# Patient Record
Sex: FEMALE | Race: BLACK OR AFRICAN AMERICAN | Employment: OTHER
[De-identification: names, ages, dates, MRNs, and addresses within clinical notes are randomized per-mention and may not be internally consistent; named-entity substitution may affect disease eponyms.]

---

## 2017-03-06 RX ORDER — POTASSIUM CHLORIDE 750 MG/1
TABLET, EXTENDED RELEASE ORAL
Qty: 90 TABLET | Refills: 1 | Status: SHIPPED | OUTPATIENT
Start: 2017-03-06 | End: 2017-09-12 | Stop reason: SDUPTHER

## 2017-04-27 ENCOUNTER — TELEPHONE (OUTPATIENT)
Dept: OTHER | Facility: CLINIC | Age: 67
End: 2017-04-27

## 2017-09-12 ENCOUNTER — OFFICE VISIT (OUTPATIENT)
Dept: INTERNAL MEDICINE CLINIC | Age: 67
End: 2017-09-12

## 2017-09-12 VITALS
SYSTOLIC BLOOD PRESSURE: 134 MMHG | RESPIRATION RATE: 20 BRPM | HEART RATE: 80 BPM | WEIGHT: 293 LBS | BODY MASS INDEX: 48.82 KG/M2 | OXYGEN SATURATION: 95 % | DIASTOLIC BLOOD PRESSURE: 72 MMHG | HEIGHT: 65 IN

## 2017-09-12 DIAGNOSIS — Z12.11 SCREEN FOR COLON CANCER: ICD-10-CM

## 2017-09-12 DIAGNOSIS — I10 ESSENTIAL HYPERTENSION: ICD-10-CM

## 2017-09-12 DIAGNOSIS — Z76.89 ENCOUNTER TO ESTABLISH CARE: ICD-10-CM

## 2017-09-12 DIAGNOSIS — Z23 NEED FOR PNEUMOCOCCAL VACCINE: ICD-10-CM

## 2017-09-12 DIAGNOSIS — Z12.31 SCREENING MAMMOGRAM, ENCOUNTER FOR: ICD-10-CM

## 2017-09-12 DIAGNOSIS — Z12.11 COLON CANCER SCREENING: ICD-10-CM

## 2017-09-12 DIAGNOSIS — Z78.0 POST-MENOPAUSAL: ICD-10-CM

## 2017-09-12 DIAGNOSIS — E66.01 MORBID OBESITY DUE TO EXCESS CALORIES (HCC): ICD-10-CM

## 2017-09-12 LAB
CREATININE URINE POCT: 97.4
HBA1C MFR BLD: 8.4 %
MICROALBUMIN/CREAT 24H UR: 5.9 MG/G{CREAT}
MICROALBUMIN/CREAT UR-RTO: 6.1

## 2017-09-12 PROCEDURE — 1090F PRES/ABSN URINE INCON ASSESS: CPT | Performed by: FAMILY MEDICINE

## 2017-09-12 PROCEDURE — 1036F TOBACCO NON-USER: CPT | Performed by: FAMILY MEDICINE

## 2017-09-12 PROCEDURE — G8400 PT W/DXA NO RESULTS DOC: HCPCS | Performed by: FAMILY MEDICINE

## 2017-09-12 PROCEDURE — 3046F HEMOGLOBIN A1C LEVEL >9.0%: CPT | Performed by: FAMILY MEDICINE

## 2017-09-12 PROCEDURE — G8427 DOCREV CUR MEDS BY ELIG CLIN: HCPCS | Performed by: FAMILY MEDICINE

## 2017-09-12 PROCEDURE — G0009 ADMIN PNEUMOCOCCAL VACCINE: HCPCS | Performed by: FAMILY MEDICINE

## 2017-09-12 PROCEDURE — 4040F PNEUMOC VAC/ADMIN/RCVD: CPT | Performed by: FAMILY MEDICINE

## 2017-09-12 PROCEDURE — 3017F COLORECTAL CA SCREEN DOC REV: CPT | Performed by: FAMILY MEDICINE

## 2017-09-12 PROCEDURE — 90732 PPSV23 VACC 2 YRS+ SUBQ/IM: CPT | Performed by: FAMILY MEDICINE

## 2017-09-12 PROCEDURE — 1123F ACP DISCUSS/DSCN MKR DOCD: CPT | Performed by: FAMILY MEDICINE

## 2017-09-12 PROCEDURE — 83036 HEMOGLOBIN GLYCOSYLATED A1C: CPT | Performed by: FAMILY MEDICINE

## 2017-09-12 PROCEDURE — 82044 UR ALBUMIN SEMIQUANTITATIVE: CPT | Performed by: FAMILY MEDICINE

## 2017-09-12 PROCEDURE — G8417 CALC BMI ABV UP PARAM F/U: HCPCS | Performed by: FAMILY MEDICINE

## 2017-09-12 PROCEDURE — 3014F SCREEN MAMMO DOC REV: CPT | Performed by: FAMILY MEDICINE

## 2017-09-12 PROCEDURE — 99204 OFFICE O/P NEW MOD 45 MIN: CPT | Performed by: FAMILY MEDICINE

## 2017-09-12 RX ORDER — TRIAMTERENE AND HYDROCHLOROTHIAZIDE 37.5; 25 MG/1; MG/1
1 TABLET ORAL DAILY
Qty: 90 TABLET | Refills: 1 | Status: SHIPPED | OUTPATIENT
Start: 2017-09-12 | End: 2018-01-14 | Stop reason: SDUPTHER

## 2017-09-12 RX ORDER — LOSARTAN POTASSIUM 50 MG/1
50 TABLET ORAL DAILY
Qty: 90 TABLET | Refills: 1 | Status: SHIPPED | OUTPATIENT
Start: 2017-09-12 | End: 2018-03-02 | Stop reason: SINTOL

## 2017-09-12 ASSESSMENT — PATIENT HEALTH QUESTIONNAIRE - PHQ9
SUM OF ALL RESPONSES TO PHQ9 QUESTIONS 1 & 2: 0
1. LITTLE INTEREST OR PLEASURE IN DOING THINGS: 0
2. FEELING DOWN, DEPRESSED OR HOPELESS: 0
SUM OF ALL RESPONSES TO PHQ QUESTIONS 1-9: 0

## 2017-09-12 ASSESSMENT — ENCOUNTER SYMPTOMS
ABDOMINAL PAIN: 0
CONSTIPATION: 0
BLOOD IN STOOL: 0
DIARRHEA: 0
SHORTNESS OF BREATH: 0

## 2017-09-20 ENCOUNTER — HOSPITAL ENCOUNTER (OUTPATIENT)
Dept: GENERAL RADIOLOGY | Age: 67
Discharge: HOME | End: 2017-09-20
Admitting: FAMILY MEDICINE

## 2017-09-20 DIAGNOSIS — Z78.0 POST-MENOPAUSAL: ICD-10-CM

## 2017-09-20 DIAGNOSIS — Z78.0 ASYMPTOMATIC MENOPAUSAL STATE: ICD-10-CM

## 2017-09-20 DIAGNOSIS — Z12.31 SCREENING MAMMOGRAM, ENCOUNTER FOR: ICD-10-CM

## 2017-09-21 ENCOUNTER — HOSPITAL ENCOUNTER (OUTPATIENT)
Dept: OTHER | Age: 67
Discharge: HOME | End: 2017-09-21
Attending: FAMILY MEDICINE | Admitting: FAMILY MEDICINE

## 2017-09-21 DIAGNOSIS — Z12.31 ENCOUNTER FOR SCREENING MAMMOGRAM FOR BREAST CANCER: ICD-10-CM

## 2017-09-23 ENCOUNTER — NURSE ONLY (OUTPATIENT)
Dept: INTERNAL MEDICINE CLINIC | Age: 67
End: 2017-09-23

## 2017-09-23 DIAGNOSIS — Z13.29 THYROID DISORDER SCREEN: ICD-10-CM

## 2017-09-23 DIAGNOSIS — I10 ESSENTIAL HYPERTENSION: ICD-10-CM

## 2017-09-23 DIAGNOSIS — Z11.59 NEED FOR HEPATITIS C SCREENING TEST: ICD-10-CM

## 2017-09-23 DIAGNOSIS — Z13.220 LIPID SCREENING: ICD-10-CM

## 2017-09-23 DIAGNOSIS — Z11.4 SCREENING FOR HIV (HUMAN IMMUNODEFICIENCY VIRUS): ICD-10-CM

## 2017-09-23 LAB
A/G RATIO: 1.1 (ref 1.1–2.2)
ALBUMIN SERPL-MCNC: 3.7 G/DL (ref 3.4–5)
ALP BLD-CCNC: 57 U/L (ref 40–129)
ALT SERPL-CCNC: 12 U/L (ref 10–40)
ANION GAP SERPL CALCULATED.3IONS-SCNC: 13 MMOL/L (ref 3–16)
AST SERPL-CCNC: 16 U/L (ref 15–37)
BASOPHILS ABSOLUTE: 0 K/UL (ref 0–0.2)
BASOPHILS RELATIVE PERCENT: 0.9 %
BILIRUB SERPL-MCNC: 0.3 MG/DL (ref 0–1)
BUN BLDV-MCNC: 23 MG/DL (ref 7–20)
CALCIUM SERPL-MCNC: 9.7 MG/DL (ref 8.3–10.6)
CHLORIDE BLD-SCNC: 102 MMOL/L (ref 99–110)
CHOLESTEROL, TOTAL: 147 MG/DL (ref 0–199)
CO2: 29 MMOL/L (ref 21–32)
CREAT SERPL-MCNC: 1.1 MG/DL (ref 0.6–1.2)
EOSINOPHILS ABSOLUTE: 0.2 K/UL (ref 0–0.6)
EOSINOPHILS RELATIVE PERCENT: 3.3 %
GFR AFRICAN AMERICAN: >60
GFR NON-AFRICAN AMERICAN: 50
GLOBULIN: 3.3 G/DL
GLUCOSE BLD-MCNC: 168 MG/DL (ref 70–99)
HCT VFR BLD CALC: 38.2 % (ref 36–48)
HDLC SERPL-MCNC: 63 MG/DL (ref 40–60)
HEMOGLOBIN: 12.5 G/DL (ref 12–16)
HEPATITIS C ANTIBODY INTERPRETATION: NORMAL
LDL CHOLESTEROL CALCULATED: 70 MG/DL
LYMPHOCYTES ABSOLUTE: 1.3 K/UL (ref 1–5.1)
LYMPHOCYTES RELATIVE PERCENT: 24.8 %
MCH RBC QN AUTO: 29 PG (ref 26–34)
MCHC RBC AUTO-ENTMCNC: 32.8 G/DL (ref 31–36)
MCV RBC AUTO: 88.4 FL (ref 80–100)
MONOCYTES ABSOLUTE: 0.5 K/UL (ref 0–1.3)
MONOCYTES RELATIVE PERCENT: 9.8 %
NEUTROPHILS ABSOLUTE: 3.1 K/UL (ref 1.7–7.7)
NEUTROPHILS RELATIVE PERCENT: 61.2 %
PDW BLD-RTO: 15.7 % (ref 12.4–15.4)
PLATELET # BLD: 238 K/UL (ref 135–450)
PMV BLD AUTO: 9.9 FL (ref 5–10.5)
POTASSIUM SERPL-SCNC: 4.2 MMOL/L (ref 3.5–5.1)
RBC # BLD: 4.32 M/UL (ref 4–5.2)
SODIUM BLD-SCNC: 144 MMOL/L (ref 136–145)
T4 FREE: 1 NG/DL (ref 0.9–1.8)
TOTAL PROTEIN: 7 G/DL (ref 6.4–8.2)
TRIGL SERPL-MCNC: 72 MG/DL (ref 0–150)
TSH SERPL DL<=0.05 MIU/L-ACNC: 2.06 UIU/ML (ref 0.27–4.2)
VLDLC SERPL CALC-MCNC: 14 MG/DL
WBC # BLD: 5.1 K/UL (ref 4–11)

## 2017-09-23 PROCEDURE — 36415 COLL VENOUS BLD VENIPUNCTURE: CPT | Performed by: FAMILY MEDICINE

## 2017-09-25 LAB — HIV-1 AND HIV-2 ANTIBODIES: NORMAL

## 2017-09-26 DIAGNOSIS — E11.69 TYPE 2 DIABETES MELLITUS WITH OTHER SPECIFIED COMPLICATION (HCC): ICD-10-CM

## 2017-09-26 LAB
CONTROL: PRESENT
HEMOCCULT STL QL: NEGATIVE

## 2017-09-26 PROCEDURE — 82274 ASSAY TEST FOR BLOOD FECAL: CPT | Performed by: FAMILY MEDICINE

## 2017-09-26 RX ORDER — METFORMIN HYDROCHLORIDE EXTENDED-RELEASE TABLETS 1000 MG/1
1000 TABLET, FILM COATED, EXTENDED RELEASE ORAL 2 TIMES DAILY WITH MEALS
Qty: 60 TABLET | Refills: 1 | Status: CANCELLED | OUTPATIENT
Start: 2017-09-26 | End: 2017-10-26

## 2017-10-03 ENCOUNTER — TELEPHONE (OUTPATIENT)
Dept: INTERNAL MEDICINE CLINIC | Age: 67
End: 2017-10-03

## 2017-10-04 RX ORDER — BLOOD SUGAR DIAGNOSTIC
STRIP MISCELLANEOUS
Qty: 100 EACH | Refills: 1 | Status: SHIPPED | OUTPATIENT
Start: 2017-10-04 | End: 2018-08-21 | Stop reason: SDUPTHER

## 2017-11-13 ENCOUNTER — OFFICE VISIT (OUTPATIENT)
Dept: INTERNAL MEDICINE CLINIC | Age: 67
End: 2017-11-13

## 2017-11-13 VITALS
SYSTOLIC BLOOD PRESSURE: 137 MMHG | WEIGHT: 293 LBS | DIASTOLIC BLOOD PRESSURE: 80 MMHG | OXYGEN SATURATION: 96 % | BODY MASS INDEX: 52.42 KG/M2 | HEART RATE: 70 BPM

## 2017-11-13 DIAGNOSIS — I10 ESSENTIAL HYPERTENSION: ICD-10-CM

## 2017-11-13 DIAGNOSIS — E11.69 TYPE 2 DIABETES MELLITUS WITH OTHER SPECIFIED COMPLICATION, WITH LONG-TERM CURRENT USE OF INSULIN (HCC): Primary | ICD-10-CM

## 2017-11-13 DIAGNOSIS — E66.01 MORBID OBESITY DUE TO EXCESS CALORIES (HCC): ICD-10-CM

## 2017-11-13 DIAGNOSIS — Z79.4 TYPE 2 DIABETES MELLITUS WITH OTHER SPECIFIED COMPLICATION, WITH LONG-TERM CURRENT USE OF INSULIN (HCC): Primary | ICD-10-CM

## 2017-11-13 LAB — HBA1C MFR BLD: 7.4 %

## 2017-11-13 PROCEDURE — 99214 OFFICE O/P EST MOD 30 MIN: CPT | Performed by: FAMILY MEDICINE

## 2017-11-13 PROCEDURE — 1036F TOBACCO NON-USER: CPT | Performed by: FAMILY MEDICINE

## 2017-11-13 PROCEDURE — G8399 PT W/DXA RESULTS DOCUMENT: HCPCS | Performed by: FAMILY MEDICINE

## 2017-11-13 PROCEDURE — 1090F PRES/ABSN URINE INCON ASSESS: CPT | Performed by: FAMILY MEDICINE

## 2017-11-13 PROCEDURE — G8427 DOCREV CUR MEDS BY ELIG CLIN: HCPCS | Performed by: FAMILY MEDICINE

## 2017-11-13 PROCEDURE — G8484 FLU IMMUNIZE NO ADMIN: HCPCS | Performed by: FAMILY MEDICINE

## 2017-11-13 PROCEDURE — 83036 HEMOGLOBIN GLYCOSYLATED A1C: CPT | Performed by: FAMILY MEDICINE

## 2017-11-13 PROCEDURE — 3017F COLORECTAL CA SCREEN DOC REV: CPT | Performed by: FAMILY MEDICINE

## 2017-11-13 PROCEDURE — G8417 CALC BMI ABV UP PARAM F/U: HCPCS | Performed by: FAMILY MEDICINE

## 2017-11-13 PROCEDURE — 4040F PNEUMOC VAC/ADMIN/RCVD: CPT | Performed by: FAMILY MEDICINE

## 2017-11-13 PROCEDURE — 3045F PR MOST RECENT HEMOGLOBIN A1C LEVEL 7.0-9.0%: CPT | Performed by: FAMILY MEDICINE

## 2017-11-13 PROCEDURE — 3014F SCREEN MAMMO DOC REV: CPT | Performed by: FAMILY MEDICINE

## 2017-11-13 PROCEDURE — 1123F ACP DISCUSS/DSCN MKR DOCD: CPT | Performed by: FAMILY MEDICINE

## 2017-11-13 ASSESSMENT — ENCOUNTER SYMPTOMS
BLOOD IN STOOL: 0
SHORTNESS OF BREATH: 0
CONSTIPATION: 0
ABDOMINAL PAIN: 0
DIARRHEA: 0

## 2017-11-13 NOTE — PROGRESS NOTES
Subjective:      Patient ID: Kaley Crawford is a 74 y.o. female. HPI  Patient return to the office for follow-up of diabetes. SHe does not check blood sugar but denies any hypoglycemic episode. Apparently she cut down the dose of Levemir to 60 units instead of 70 units per day. She still take care NovoLog flex pen and follow  sliding scale but generally inject herself 10 units at breakfast and 20 units at supper. She on the eat small amount  at lunch time and generally does not the inject NovoLog insulin  Review of Systems   Constitutional: Negative for activity change and appetite change. Eyes: Negative for visual disturbance. Respiratory: Negative for shortness of breath. Cardiovascular: Negative for chest pain and leg swelling. Gastrointestinal: Negative for abdominal pain, blood in stool, constipation and diarrhea. Genitourinary: Negative for difficulty urinating, frequency, hematuria, menstrual problem and urgency. Neurological: Negative for dizziness and syncope. Psychiatric/Behavioral: Negative for behavioral problems. Objective:   Physical Exam   Constitutional: She is oriented to person, place, and time. She appears well-developed and well-nourished. No distress. HENT:   Head: Normocephalic. Eyes: Conjunctivae are normal.   Neck: Neck supple. No thyromegaly present. Cardiovascular: Normal rate, regular rhythm and normal heart sounds. No murmur heard. Pulmonary/Chest: Breath sounds normal. No respiratory distress. She has no wheezes. She has no rales. Abdominal: Soft. She exhibits no distension. Musculoskeletal: Normal range of motion. She exhibits no edema. Neurological: She is alert and oriented to person, place, and time. Skin: Skin is warm. No rash noted. Psychiatric: She has a normal mood and affect. Her behavior is normal.       Assessment:      1.  Type 2 diabetes mellitus with other specified complication, with long-term current use of insulin (HCC) -HbA1c today 7.2% from 8.4% last visit. Continue Levemir 70 units daily and NovoLog insulin 10 units at breakfast and lunch and 20 units at supper. Plus metformin 500 mg twice a day   2. Essential hypertension -controlled continue losartan 50 mg daily and the Maxide 25 daily   3.  Morbid obesity due to excess calories ([address] -she needs to lose weight with diet and exercise            Plan:      As above RTC in 3 mos

## 2018-01-08 RX ORDER — POTASSIUM CHLORIDE 750 MG/1
10 TABLET, EXTENDED RELEASE ORAL DAILY
Qty: 30 TABLET | Refills: 5 | Status: SHIPPED | OUTPATIENT
Start: 2018-01-08 | End: 2018-01-09 | Stop reason: SDUPTHER

## 2018-01-09 ENCOUNTER — OFFICE VISIT (OUTPATIENT)
Dept: INTERNAL MEDICINE CLINIC | Age: 68
End: 2018-01-09

## 2018-01-09 VITALS
RESPIRATION RATE: 20 BRPM | SYSTOLIC BLOOD PRESSURE: 132 MMHG | DIASTOLIC BLOOD PRESSURE: 64 MMHG | OXYGEN SATURATION: 96 % | HEART RATE: 82 BPM

## 2018-01-09 DIAGNOSIS — I10 ESSENTIAL HYPERTENSION: ICD-10-CM

## 2018-01-09 DIAGNOSIS — Z79.4 TYPE 2 DIABETES MELLITUS WITH OTHER SPECIFIED COMPLICATION, WITH LONG-TERM CURRENT USE OF INSULIN (HCC): ICD-10-CM

## 2018-01-09 DIAGNOSIS — J20.9 ACUTE BRONCHITIS DUE TO INFECTION: Primary | ICD-10-CM

## 2018-01-09 DIAGNOSIS — E11.69 TYPE 2 DIABETES MELLITUS WITH OTHER SPECIFIED COMPLICATION, WITH LONG-TERM CURRENT USE OF INSULIN (HCC): ICD-10-CM

## 2018-01-09 DIAGNOSIS — E66.01 MORBID OBESITY DUE TO EXCESS CALORIES (HCC): ICD-10-CM

## 2018-01-09 PROCEDURE — 1090F PRES/ABSN URINE INCON ASSESS: CPT | Performed by: FAMILY MEDICINE

## 2018-01-09 PROCEDURE — G8417 CALC BMI ABV UP PARAM F/U: HCPCS | Performed by: FAMILY MEDICINE

## 2018-01-09 PROCEDURE — 99214 OFFICE O/P EST MOD 30 MIN: CPT | Performed by: FAMILY MEDICINE

## 2018-01-09 PROCEDURE — 1123F ACP DISCUSS/DSCN MKR DOCD: CPT | Performed by: FAMILY MEDICINE

## 2018-01-09 PROCEDURE — G8484 FLU IMMUNIZE NO ADMIN: HCPCS | Performed by: FAMILY MEDICINE

## 2018-01-09 PROCEDURE — 1036F TOBACCO NON-USER: CPT | Performed by: FAMILY MEDICINE

## 2018-01-09 PROCEDURE — G8399 PT W/DXA RESULTS DOCUMENT: HCPCS | Performed by: FAMILY MEDICINE

## 2018-01-09 PROCEDURE — 3046F HEMOGLOBIN A1C LEVEL >9.0%: CPT | Performed by: FAMILY MEDICINE

## 2018-01-09 PROCEDURE — 4040F PNEUMOC VAC/ADMIN/RCVD: CPT | Performed by: FAMILY MEDICINE

## 2018-01-09 PROCEDURE — G8427 DOCREV CUR MEDS BY ELIG CLIN: HCPCS | Performed by: FAMILY MEDICINE

## 2018-01-09 PROCEDURE — 3014F SCREEN MAMMO DOC REV: CPT | Performed by: FAMILY MEDICINE

## 2018-01-09 PROCEDURE — 3017F COLORECTAL CA SCREEN DOC REV: CPT | Performed by: FAMILY MEDICINE

## 2018-01-09 RX ORDER — AMOXICILLIN 875 MG/1
875 TABLET, COATED ORAL 2 TIMES DAILY
Qty: 20 TABLET | Refills: 0 | Status: SHIPPED | OUTPATIENT
Start: 2018-01-09 | End: 2018-01-19

## 2018-01-09 RX ORDER — PROMETHAZINE HYDROCHLORIDE AND CODEINE PHOSPHATE 6.25; 1 MG/5ML; MG/5ML
5 SYRUP ORAL 4 TIMES DAILY PRN
Qty: 120 ML | Refills: 0 | Status: SHIPPED | OUTPATIENT
Start: 2018-01-09 | End: 2018-01-23 | Stop reason: SDUPTHER

## 2018-01-09 RX ORDER — POTASSIUM CHLORIDE 750 MG/1
10 TABLET, EXTENDED RELEASE ORAL DAILY
Qty: 90 TABLET | Refills: 1 | Status: SHIPPED | OUTPATIENT
Start: 2018-01-09 | End: 2018-03-02 | Stop reason: SDUPTHER

## 2018-01-09 RX ORDER — AMOXICILLIN 875 MG/1
875 TABLET, COATED ORAL 2 TIMES DAILY
Qty: 20 TABLET | Refills: 0 | Status: SHIPPED | OUTPATIENT
Start: 2018-01-09 | End: 2018-01-09 | Stop reason: SDUPTHER

## 2018-01-09 ASSESSMENT — ENCOUNTER SYMPTOMS
ABDOMINAL PAIN: 0
CONSTIPATION: 0
BLOOD IN STOOL: 0
DIARRHEA: 0
SHORTNESS OF BREATH: 0

## 2018-01-14 DIAGNOSIS — I10 ESSENTIAL HYPERTENSION: ICD-10-CM

## 2018-01-15 RX ORDER — TRIAMTERENE AND HYDROCHLOROTHIAZIDE 37.5; 25 MG/1; MG/1
TABLET ORAL
Qty: 90 TABLET | Refills: 1 | Status: SHIPPED | OUTPATIENT
Start: 2018-01-15 | End: 2018-04-16 | Stop reason: SDUPTHER

## 2018-01-23 ENCOUNTER — TELEPHONE (OUTPATIENT)
Dept: INTERNAL MEDICINE CLINIC | Age: 68
End: 2018-01-23

## 2018-01-23 DIAGNOSIS — J20.9 ACUTE BRONCHITIS DUE TO INFECTION: ICD-10-CM

## 2018-01-23 RX ORDER — PROMETHAZINE HYDROCHLORIDE AND CODEINE PHOSPHATE 6.25; 1 MG/5ML; MG/5ML
5 SYRUP ORAL 4 TIMES DAILY PRN
Qty: 240 ML | Refills: 0 | OUTPATIENT
Start: 2018-01-23 | End: 2019-11-22 | Stop reason: SDUPTHER

## 2018-02-28 ENCOUNTER — OFFICE VISIT (OUTPATIENT)
Dept: ORTHOPEDIC SURGERY | Age: 68
End: 2018-02-28

## 2018-02-28 VITALS — SYSTOLIC BLOOD PRESSURE: 153 MMHG | DIASTOLIC BLOOD PRESSURE: 66 MMHG | HEART RATE: 83 BPM

## 2018-02-28 DIAGNOSIS — M65.342 TRIGGER RING FINGER OF LEFT HAND: ICD-10-CM

## 2018-02-28 PROCEDURE — G8427 DOCREV CUR MEDS BY ELIG CLIN: HCPCS | Performed by: ORTHOPAEDIC SURGERY

## 2018-02-28 PROCEDURE — G8484 FLU IMMUNIZE NO ADMIN: HCPCS | Performed by: ORTHOPAEDIC SURGERY

## 2018-02-28 PROCEDURE — G8417 CALC BMI ABV UP PARAM F/U: HCPCS | Performed by: ORTHOPAEDIC SURGERY

## 2018-02-28 PROCEDURE — 3014F SCREEN MAMMO DOC REV: CPT | Performed by: ORTHOPAEDIC SURGERY

## 2018-02-28 PROCEDURE — 3017F COLORECTAL CA SCREEN DOC REV: CPT | Performed by: ORTHOPAEDIC SURGERY

## 2018-02-28 PROCEDURE — 1090F PRES/ABSN URINE INCON ASSESS: CPT | Performed by: ORTHOPAEDIC SURGERY

## 2018-02-28 PROCEDURE — 99203 OFFICE O/P NEW LOW 30 MIN: CPT | Performed by: ORTHOPAEDIC SURGERY

## 2018-02-28 PROCEDURE — 4040F PNEUMOC VAC/ADMIN/RCVD: CPT | Performed by: ORTHOPAEDIC SURGERY

## 2018-02-28 NOTE — LETTER
[facility]  Surgery Scheduling Form:      DEMOGRAPHICS:                                                                                                              .    Patient Name:  Edie Orozco  Patient :  1950   Patient SS#:  [id number]    Patient Phone:  [phone] (home)     Patient Address:  [address] [place] [address]    PCP:  Lelia Alford MD  Insurance:  Payor/Plan Subscr  Sex Relation Sub. Ins. ID Effective Group Num   1. MEDICARE - ME* CLAU OROZCO 1950 Female  [id number]                                    [address]      DIAGNOSIS & PROCEDURE:                                                                                            .  Diagnosis:   left Ring Finger Trigger Finger (727.03)  Operation:  left Ring Finger Trigger Finger Release  Location:  [facility] [place]  Surgeon:  Rebekah Roberto    SCHEDULING INFORMATION:                                                                                         .  Surgeon's Scheduling Instruction:  elective    Requested Date:  3-8  OR Time:  10:50 Patient Arrival Time:  9:15  OR Time Required:  5  Minutes  Anesthesia:  MAC/TIVA  Equipment:  None  Mini C-Arm:  No   Standard C-Arm:  No  Status:  outpatient  PAT Required:  Yes  Comments:             ALLERGIES:  CEPHALEXIN                       Jr Bowen MD  18     BILLING INFORMATION:                                                                                                    .    Procedure:       CPT Code Modifier

## 2018-02-28 NOTE — LETTER
CONSENT TO OPERATION  AND/OR OTHER PROCEDURE(S)          PATIENT : Oziel Baez   YOB: 1950      DATE : 2/28/18          1. I request and consent that Dr. Emma Jade. Gil Cohen,  and/or his associates or assistants perform an operation and/or procedures on the above patient at  [place] [address], to treat the condition(s) which appear indicated by the diagnostic studies already performed. I have been told that in general terms the nature, purpose and reasonable expectations of the operation and/or procedure(s) are:           left Ring Finger Trigger Finger Release      2. It has been explained to me by the informing physician that during the course of the operation and/or procedure(s) unforeseen conditions may be revealed that necessitate an extension of the original operation and/or procedure(s) or different operation and/or procedures than those set forth in Paragraph 1. I therefore authorize and request that my physician and/or his associates or assistants perform such operations and/or procedures as are necessary and desirable in the exercise of professional judgment. The authority granted under this Paragraph 2 shall extend to all conditions that require treatment and are known to my physician at the time the operation is commenced. 3. I have been made aware by the informing physician of certain risks and consequences that are associated with the operation and/or procedure(s) described in Paragraph 1, the reasonable alternative methods or treatment, the possible consequences, the possibility that the operation and/or procedure(s) may be unsuccessful and the possibility of complications. I understand the reasonably known risks to be:      ? Bleeding  ? Infection  ? Poor Healing  ? Possible Damage to Nerve, Vessel, Tendon/Muscle or Bone  ? Need for further Treatment/Surgery  ? Stiffness  ? Pain  ? Residual or Recurrent Symptoms  ?  Anesthetic and/or Medical Risks ?                   4. I have also been informed by the informing physician that there are other risks from both known and unknown causes that are attendant to the performance of any surgical procedure. I am aware that the practice of medicine and surgery is not an exact science, and that no guarantees have been made to me concerning the results of the operation and/or procedure(s). 5. I   CONSENT / REFUSE CONSENT  (strike the phrase that does not apply) to the taking of photographs before, during and/or after the operation or procedure for scientific/educational purposes. 6. I consent to the administration of anesthesia and to the use of such anesthetics as may be deemed advisable by the anesthesiologist who has been engaged by me or my physician. 7. I certify that I have read and understand the above consent to operation and/or other procedure(s); that the explanations therein referred to were made to me by the informing physician in advance of my signing this consent; that all blanks or statements requiring insertion or completion were filled in and inapplicable paragraphs, if any, were stricken before I signed; and that all questions asked by me about the operation and/or procedure(s) which I have consented to have been fully answered in a satisfactory manner.               _______________________  Witness        Signature Of Patient          Amaya Mathew. Marlo Klinefelter      _______________________  Informing Physician         Signature of Informing Physician           If patient is unable to sign or is a minor, complete one of the following:    (A)  Patient is a minor   years of age. (B)  Patient is unable to sign because: The undersigned represents that he or she is duly authorized to execute this consent for and on behalf of the above named patient.                Witness               o  Parent  o  Guardian   o  Spouse       o  Other (specify)

## 2018-02-28 NOTE — PROGRESS NOTES
all to her satisfaction. I feel that Ms. Fermin Baez  and any present family members do understand our discussion today and they have provided informed consent for left Ring Finger Flexor Tendon Sheath A1 Pulley Release (Trigger Finger Release). I have also discussed with Ms. Dominique Mosquera the other treatment options available to her for this condition. We have today selected to proceed with Surgical treatment. She has voiced and  understanding that there are other less aggressive treatment options which are available in this situation, albeit possibly less efficacious or durable, and she is comfortable with the plan that she has chosen. Ms. Dominique Mosquera has been given a full verbal list of instructions and precautions related to her present condition. I have asked her to followup with me in the office at the prescribed time. She is also specifically requested to call or return to the office sooner if her symptoms change or worsen prior to the next scheduled appointment.

## 2018-02-28 NOTE — PATIENT INSTRUCTIONS
Pre-Operative Instructions    1. The night before your surgery, unless otherwise instructed, do not eat any food, drink any liquids, chew gum or mints after midnight. Abstain from alcohol for 24 hours prior to surgery. 2. You will be contacted by the Hospital the working day prior to your procedure to confirm your arrival time. 3. Patients under 25years of age must have a parent or legal guardian present to sign their consent and discharge paperwork. 4. On the day of surgery,  you will be seen pre-operatively by an anesthesiologist.     5. If you are having hand surgery, it is recommended that nail polish and acrylic nails be removed prior to surgery if possible. 6. Please bring cases for glasses, contact lenses, hearing aids or dentures. They will likely be removed prior to surgery. 7. Wear casual, loose-fitting and comfortable clothing. Consider that you may have a large dressing to fit under your clothing after surgery. 9. Please do not bring valuables such as jewelry or large sums of cash to the hospital. Remove all body piercings before coming to the hospital. Landon Sánchez may not  wear any rings on the hand if you are having surgery on that hand, wrist or elbow. 10. Do not smoke or chew tobacco before your surgery. [address] [place] [address] and surgery facilities are smoke-free environments. Smoking is not permitted anywhere on campus. 11. Be sure to follow any additional instructions from your physician. If the above conditions are not met, your surgery may be cancelled and rescheduled for another day. Should you develop any change in your health such as fever, cough, sore throat, cold, flu, or infection, or if you have any questions regarding your Pre-admission or surgery, please contact [address] - Surgery Scheduling at [phone], Monday through Friday, 9 a.m. to 5 p.m.

## 2018-03-01 ENCOUNTER — PAT TELEPHONE (OUTPATIENT)
Dept: PREADMISSION TESTING | Age: 68
End: 2018-03-01

## 2018-03-01 VITALS — BODY MASS INDEX: 48.82 KG/M2 | WEIGHT: 293 LBS | HEIGHT: 65 IN

## 2018-03-01 NOTE — PRE-PROCEDURE INSTRUCTIONS
[facility] time_0945___________        Surgery time___1120_________    Take the following medications with a sip of water:triamterene-hold metformin dos and evening dose night before surgery-ask pcp for insulin instructions prior to surgery  Do not eat or drink anything after 12:00 midnight prior to your surgery. This includes water chewing gum, mints and ice chips. You may brush your teeth and gargle the morning of your surgery, but do not swallow the water     Please see your family doctor/pediatrician for a history and physical and/or concerning medications. Bring any test results/reports from your physicians office. If you are under the care of a heart doctor or specialist doctor, please be aware that you may be asked to them for clearance    You may be asked to stop blood thinners such as Coumadin, Plavix, Fragmin, Lovenox, etc., or any anti-inflammatories such as:  Aspirin, Ibuprofen, Advil, Naproxen prior to your surgery. We also ask that you stop any OTC medications such as fish oil, vitamin E, glucosamine, garlic, Multivitamins, COQ 10, etc.may take tylenol    We ask that you do not smoke 24 hours prior to surgery  We ask that you do not  drink any alcoholic beverages 24 hours prior to surgery     You must make arrangements for a responsible adult to take you home after your surgery. For your safety you will not be allowed to leave alone or drive yourself home. Your surgery will be cancelled if you do not have a ride home. Also for your safety, it is strongly suggested that someone stay with you the first 24 hours after your surgery. A parent or legal guardian must accompany a child scheduled for surgery and plan to stay at the hospital until the child is discharged. Please do not bring other children with you. For your comfort, please wear simple loose fitting clothing to the hospital.  Please do not bring valuables.     Do not instructions according to your surgery.

## 2018-03-02 ENCOUNTER — OFFICE VISIT (OUTPATIENT)
Dept: INTERNAL MEDICINE CLINIC | Age: 68
End: 2018-03-02

## 2018-03-02 VITALS
SYSTOLIC BLOOD PRESSURE: 116 MMHG | DIASTOLIC BLOOD PRESSURE: 62 MMHG | HEART RATE: 83 BPM | WEIGHT: 293 LBS | BODY MASS INDEX: 51.95 KG/M2

## 2018-03-02 DIAGNOSIS — Z01.818 PRE-OP EVALUATION: Primary | ICD-10-CM

## 2018-03-02 DIAGNOSIS — I10 ESSENTIAL HYPERTENSION: ICD-10-CM

## 2018-03-02 DIAGNOSIS — M65.342 TRIGGER FINGER, LEFT RING FINGER: ICD-10-CM

## 2018-03-02 LAB — HBA1C MFR BLD: 8.3 %

## 2018-03-02 PROCEDURE — 93000 ELECTROCARDIOGRAM COMPLETE: CPT | Performed by: FAMILY MEDICINE

## 2018-03-02 PROCEDURE — 83036 HEMOGLOBIN GLYCOSYLATED A1C: CPT | Performed by: FAMILY MEDICINE

## 2018-03-02 PROCEDURE — 4040F PNEUMOC VAC/ADMIN/RCVD: CPT | Performed by: FAMILY MEDICINE

## 2018-03-02 PROCEDURE — 3017F COLORECTAL CA SCREEN DOC REV: CPT | Performed by: FAMILY MEDICINE

## 2018-03-02 PROCEDURE — G8484 FLU IMMUNIZE NO ADMIN: HCPCS | Performed by: FAMILY MEDICINE

## 2018-03-02 PROCEDURE — G8399 PT W/DXA RESULTS DOCUMENT: HCPCS | Performed by: FAMILY MEDICINE

## 2018-03-02 PROCEDURE — 1123F ACP DISCUSS/DSCN MKR DOCD: CPT | Performed by: FAMILY MEDICINE

## 2018-03-02 PROCEDURE — 99214 OFFICE O/P EST MOD 30 MIN: CPT | Performed by: FAMILY MEDICINE

## 2018-03-02 PROCEDURE — 1036F TOBACCO NON-USER: CPT | Performed by: FAMILY MEDICINE

## 2018-03-02 PROCEDURE — G8417 CALC BMI ABV UP PARAM F/U: HCPCS | Performed by: FAMILY MEDICINE

## 2018-03-02 PROCEDURE — 3014F SCREEN MAMMO DOC REV: CPT | Performed by: FAMILY MEDICINE

## 2018-03-02 PROCEDURE — 1090F PRES/ABSN URINE INCON ASSESS: CPT | Performed by: FAMILY MEDICINE

## 2018-03-02 PROCEDURE — G8427 DOCREV CUR MEDS BY ELIG CLIN: HCPCS | Performed by: FAMILY MEDICINE

## 2018-03-02 PROCEDURE — 36415 COLL VENOUS BLD VENIPUNCTURE: CPT | Performed by: FAMILY MEDICINE

## 2018-03-02 RX ORDER — POTASSIUM CHLORIDE 750 MG/1
10 TABLET, EXTENDED RELEASE ORAL DAILY
Qty: 90 TABLET | Refills: 1 | Status: SHIPPED | OUTPATIENT
Start: 2018-03-02 | End: 2018-04-16 | Stop reason: SDUPTHER

## 2018-03-03 LAB
ANION GAP SERPL CALCULATED.3IONS-SCNC: 11 MMOL/L (ref 3–16)
BASOPHILS ABSOLUTE: 0 K/UL (ref 0–0.2)
BASOPHILS RELATIVE PERCENT: 0.6 %
BUN BLDV-MCNC: 18 MG/DL (ref 7–20)
CALCIUM SERPL-MCNC: 8.8 MG/DL (ref 8.3–10.6)
CHLORIDE BLD-SCNC: 100 MMOL/L (ref 99–110)
CO2: 29 MMOL/L (ref 21–32)
CREAT SERPL-MCNC: 1.1 MG/DL (ref 0.6–1.2)
EOSINOPHILS ABSOLUTE: 0.3 K/UL (ref 0–0.6)
EOSINOPHILS RELATIVE PERCENT: 4.8 %
GFR AFRICAN AMERICAN: >60
GFR NON-AFRICAN AMERICAN: 50
GLUCOSE BLD-MCNC: 221 MG/DL (ref 70–99)
HCT VFR BLD CALC: 35.5 % (ref 36–48)
HEMOGLOBIN: 12 G/DL (ref 12–16)
LYMPHOCYTES ABSOLUTE: 1.4 K/UL (ref 1–5.1)
LYMPHOCYTES RELATIVE PERCENT: 26.3 %
MCH RBC QN AUTO: 29.2 PG (ref 26–34)
MCHC RBC AUTO-ENTMCNC: 33.9 G/DL (ref 31–36)
MCV RBC AUTO: 86.1 FL (ref 80–100)
MONOCYTES ABSOLUTE: 0.5 K/UL (ref 0–1.3)
MONOCYTES RELATIVE PERCENT: 9.2 %
NEUTROPHILS ABSOLUTE: 3.1 K/UL (ref 1.7–7.7)
NEUTROPHILS RELATIVE PERCENT: 59.1 %
PDW BLD-RTO: 14.8 % (ref 12.4–15.4)
PLATELET # BLD: 244 K/UL (ref 135–450)
PMV BLD AUTO: 10.7 FL (ref 5–10.5)
POTASSIUM SERPL-SCNC: 4 MMOL/L (ref 3.5–5.1)
RBC # BLD: 4.13 M/UL (ref 4–5.2)
SODIUM BLD-SCNC: 140 MMOL/L (ref 136–145)
WBC # BLD: 5.3 K/UL (ref 4–11)

## 2018-03-05 ENCOUNTER — SURG/PROC ORDERS (OUTPATIENT)
Dept: ANESTHESIOLOGY | Age: 68
End: 2018-03-05

## 2018-03-05 RX ORDER — SODIUM CHLORIDE 0.9 % (FLUSH) 0.9 %
10 SYRINGE (ML) INJECTION PRN
Status: CANCELLED | OUTPATIENT
Start: 2018-03-05

## 2018-03-05 RX ORDER — SODIUM CHLORIDE 9 MG/ML
INJECTION, SOLUTION INTRAVENOUS CONTINUOUS
Status: CANCELLED | OUTPATIENT
Start: 2018-03-05

## 2018-03-05 RX ORDER — SODIUM CHLORIDE 0.9 % (FLUSH) 0.9 %
10 SYRINGE (ML) INJECTION EVERY 12 HOURS SCHEDULED
Status: CANCELLED | OUTPATIENT
Start: 2018-03-05

## 2018-03-08 ENCOUNTER — HOSPITAL ENCOUNTER (OUTPATIENT)
Dept: SURGERY | Age: 68
Discharge: HOME | End: 2018-03-08
Attending: ORTHOPAEDIC SURGERY | Admitting: ORTHOPAEDIC SURGERY

## 2018-03-08 VITALS
RESPIRATION RATE: 18 BRPM | HEART RATE: 78 BPM | DIASTOLIC BLOOD PRESSURE: 78 MMHG | BODY MASS INDEX: 51.95 KG/M2 | TEMPERATURE: 97.6 F | OXYGEN SATURATION: 100 % | WEIGHT: 293 LBS | SYSTOLIC BLOOD PRESSURE: 154 MMHG

## 2018-03-08 LAB
GLUCOSE BLD-MCNC: 137 MG/DL (ref 70–99)
GLUCOSE BLD-MCNC: 149 MG/DL (ref 70–99)
PERFORMED ON: ABNORMAL
PERFORMED ON: ABNORMAL

## 2018-03-08 RX ORDER — SODIUM CHLORIDE 0.9 % (FLUSH) 0.9 %
10 SYRINGE (ML) INJECTION PRN
Status: DISCONTINUED | OUTPATIENT
Start: 2018-03-08 | End: 2018-03-09 | Stop reason: HOSPADM

## 2018-03-08 RX ORDER — ACETAMINOPHEN 650 MG/1
SUPPOSITORY RECTAL
Status: DISPENSED
Start: 2018-03-08 | End: 2018-03-08

## 2018-03-08 RX ORDER — ACETAMINOPHEN 500 MG
1000 TABLET ORAL ONCE
Status: COMPLETED | OUTPATIENT
Start: 2018-03-08 | End: 2018-03-08

## 2018-03-08 RX ORDER — SODIUM CHLORIDE 0.9 % (FLUSH) 0.9 %
10 SYRINGE (ML) INJECTION EVERY 12 HOURS SCHEDULED
Status: DISCONTINUED | OUTPATIENT
Start: 2018-03-08 | End: 2018-03-09 | Stop reason: HOSPADM

## 2018-03-08 RX ORDER — SODIUM CHLORIDE 9 MG/ML
INJECTION, SOLUTION INTRAVENOUS CONTINUOUS
Status: DISCONTINUED | OUTPATIENT
Start: 2018-03-08 | End: 2018-03-09 | Stop reason: HOSPADM

## 2018-03-08 RX ADMIN — SODIUM CHLORIDE: 9 INJECTION, SOLUTION INTRAVENOUS at 09:39

## 2018-03-08 RX ADMIN — Medication 1000 MG: at 11:29

## 2018-03-08 ASSESSMENT — ENCOUNTER SYMPTOMS: SHORTNESS OF BREATH: 0

## 2018-03-08 ASSESSMENT — PAIN - FUNCTIONAL ASSESSMENT: PAIN_FUNCTIONAL_ASSESSMENT: 0-10

## 2018-03-08 ASSESSMENT — PAIN DESCRIPTION - ORIENTATION: ORIENTATION: LEFT

## 2018-03-08 ASSESSMENT — PAIN DESCRIPTION - PAIN TYPE: TYPE: ACUTE PAIN;SURGICAL PAIN

## 2018-03-08 ASSESSMENT — PAIN SCALES - GENERAL
PAINLEVEL_OUTOF10: 1
PAINLEVEL_OUTOF10: 10
PAINLEVEL_OUTOF10: 0

## 2018-03-08 ASSESSMENT — PAIN DESCRIPTION - DESCRIPTORS: DESCRIPTORS: DISCOMFORT

## 2018-03-08 ASSESSMENT — PAIN DESCRIPTION - LOCATION: LOCATION: HAND

## 2018-03-08 NOTE — PROGRESS NOTES
Up to bathroom & voided qs. Tolerated juice & cookies po well. Fingers move well & are natural in color. Call placed for son to take her home, but did not locate him in lobby.

## 2018-03-08 NOTE — OP NOTE
under direct visualization. The flexor tendons were gently withdrawn from the sheath and inspected. They were found to be in good condition. The tendons were returned to their appropriate location and the finger was placed through a full range of motion. There was no evidence of residual stenosis or triggering. The wound was irrigated copiously with sterile saline for irrigation and the pneumo-tourniquet was deflated after a period of 2 minutes elevation. Hemostasis was easily obtained with direct pressure and electrocautery. The wound was closed with interrupted sutures in the skin. The wound was dressed with adaptic, dry sterile gauze, and a bulky, hand based dressing was applied. Ms. Howard Chan  was awakened from light sedation, having tolerated the procedure without apparent complication, and was returned to the recovery room in stable condition. At the conclusion of the procedure all needle, instrument, and sponge counts were correct. Lawanda Ren Marlo Klinefelter, MD   3/8/2018, 10:59 AM

## 2018-03-08 NOTE — PROGRESS NOTES
Patient C/O pain at 10 of 10 and medicated with Tylenol 1000mg po per order. Patient took ginger ale and crackers before oral medication. VSS. IV patent. Left hand with dressing dry and intact with stable neurovascular checks to LUE.

## 2018-03-08 NOTE — ANESTHESIA PRE-OP
differently: Inject 70 Units into the skin daily ) 3 vial 2    glucose blood VI test strips (ASCENSIA AUTODISC VI;ONE TOUCH ULTRA TEST VI) strip Test fsbs  each 3    insulin aspart (NOVOLOG FLEXPEN) 100 UNIT/ML injection pen Inject 10 units at breakfast and lunch and 20 units at supper 10 Pen 5    Insulin Syringe-Needle U-100 31G X 5/16\" 1 ML MISC Use once daily with Levemir injections. Dx: Type II or unspecified type diabetes mellitus  [E11.9] 100 each 1     No current facility-administered medications on file prior to encounter. Current Outpatient Prescriptions   Medication Sig Dispense Refill    potassium chloride (KLOR-CON M10) 10 MEQ extended release tablet Take 1 tablet by mouth daily 90 tablet 1    triamterene-hydrochlorothiazide (MAXZIDE-25) 37.5-25 MG per tablet TAKE 1 TABLET BY MOUTH ONE TIME A DAY 90 tablet 1    metFORMIN (GLUCOPHAGE) 500 MG tablet TAKE 1 TABLET BY MOUTH TWO TIMES A DAY WITH MEALS  60 tablet 2    insulin detemir (LEVEMIR) 100 UNIT/ML injection vial Inject 70 Units into the skin nightly (Patient taking differently: Inject 70 Units into the skin daily ) 3 vial 2    glucose blood VI test strips (ASCENSIA AUTODISC VI;ONE TOUCH ULTRA TEST VI) strip Test fsbs  each 3    insulin aspart (NOVOLOG FLEXPEN) 100 UNIT/ML injection pen Inject 10 units at breakfast and lunch and 20 units at supper 10 Pen 5    Insulin Syringe-Needle U-100 31G X 5/16\" 1 ML MISC Use once daily with Levemir injections.   Dx: Type II or unspecified type diabetes mellitus  [E11.9] 100 each 1     Current Facility-Administered Medications   Medication Dose Route Frequency Provider Last Rate Last Dose    0.9 % sodium chloride infusion   Intravenous Continuous Paola Cespedes MD 75 mL/hr at 03/08/18 7765      sodium chloride flush 0.9 % injection 10 mL  10 mL Intravenous 2 times per day Paola Cespedes MD        sodium chloride flush 0.9 % injection 10 mL  10 mL Intravenous PRN chart reviewed (including anesthesia, drug and allergy history). No interval changes to history and physical examination. Anesthetic plan, risks, benefits, alternatives, and personnel involved discussed with patient. Patient verbalized an understanding and agrees to proceed.       Pearson Gosselin, MD  March 8, 2018  9:51 AM

## 2018-03-14 ENCOUNTER — OFFICE VISIT (OUTPATIENT)
Dept: ORTHOPEDIC SURGERY | Age: 68
End: 2018-03-14

## 2018-03-14 VITALS — HEIGHT: 65 IN | WEIGHT: 293 LBS | BODY MASS INDEX: 48.82 KG/M2 | RESPIRATION RATE: 16 BRPM

## 2018-03-14 DIAGNOSIS — M65.342 TRIGGER RING FINGER OF LEFT HAND: Primary | ICD-10-CM

## 2018-03-14 PROCEDURE — 99024 POSTOP FOLLOW-UP VISIT: CPT | Performed by: PHYSICIAN ASSISTANT

## 2018-03-22 ENCOUNTER — OFFICE VISIT (OUTPATIENT)
Dept: ORTHOPEDIC SURGERY | Age: 68
End: 2018-03-22

## 2018-03-22 VITALS — BODY MASS INDEX: 48.82 KG/M2 | HEIGHT: 65 IN | RESPIRATION RATE: 16 BRPM | WEIGHT: 293 LBS

## 2018-03-22 DIAGNOSIS — M65.342 TRIGGER RING FINGER OF LEFT HAND: Primary | ICD-10-CM

## 2018-03-22 PROCEDURE — 99024 POSTOP FOLLOW-UP VISIT: CPT | Performed by: ORTHOPAEDIC SURGERY

## 2018-03-22 NOTE — PROGRESS NOTES
Ms. Jose Luis Boyd is seen today in follow of her Left Ring trigger finger release done 2 weeks ago. She presents today for scheduled suture removal according to Dr. Carlos A Anton' postoperative protocol. She today has no other new concerns. Her wound is inspected and is healing well, no significant drainage, no dehiscence, no significant erythema. There appear to be no contraindications to suture removal.    Ms. Maria Esther Engel sutures were removed today without difficulty. Skin remained well approximated. She is provided appropriate instructions on the management of the incision and is asked to follow all of Dr. Fred Montalvo' prior instructions. Ms. Jose Luis Boyd is asked to contact Dr. Fred Montalvo or schedule a follow-up appointment at any time that she feels the need for any further evaluation or treatment for her incision or requires any further post-operative care. If she feels that she continues to be feeling and functioning well, she may choose not to seek any further follow-up or treatment at her discretion. Our office will remain available to continue her care at any time in the future.

## 2018-04-16 ENCOUNTER — OFFICE VISIT (OUTPATIENT)
Dept: INTERNAL MEDICINE CLINIC | Age: 68
End: 2018-04-16

## 2018-04-16 VITALS
BODY MASS INDEX: 51.95 KG/M2 | SYSTOLIC BLOOD PRESSURE: 134 MMHG | RESPIRATION RATE: 18 BRPM | WEIGHT: 293 LBS | HEART RATE: 74 BPM | DIASTOLIC BLOOD PRESSURE: 72 MMHG | OXYGEN SATURATION: 99 %

## 2018-04-16 DIAGNOSIS — I10 ESSENTIAL HYPERTENSION: ICD-10-CM

## 2018-04-16 DIAGNOSIS — E66.01 MORBID OBESITY DUE TO EXCESS CALORIES (HCC): ICD-10-CM

## 2018-04-16 DIAGNOSIS — M65.342 TRIGGER RING FINGER OF LEFT HAND: ICD-10-CM

## 2018-04-16 DIAGNOSIS — Z79.4 TYPE 2 DIABETES MELLITUS WITH OTHER SPECIFIED COMPLICATION, WITH LONG-TERM CURRENT USE OF INSULIN (HCC): ICD-10-CM

## 2018-04-16 DIAGNOSIS — E11.69 TYPE 2 DIABETES MELLITUS WITH OTHER SPECIFIED COMPLICATION, WITH LONG-TERM CURRENT USE OF INSULIN (HCC): ICD-10-CM

## 2018-04-16 LAB — HBA1C MFR BLD: 8.5 %

## 2018-04-16 PROCEDURE — G8417 CALC BMI ABV UP PARAM F/U: HCPCS | Performed by: FAMILY MEDICINE

## 2018-04-16 PROCEDURE — G8399 PT W/DXA RESULTS DOCUMENT: HCPCS | Performed by: FAMILY MEDICINE

## 2018-04-16 PROCEDURE — 4040F PNEUMOC VAC/ADMIN/RCVD: CPT | Performed by: FAMILY MEDICINE

## 2018-04-16 PROCEDURE — 3014F SCREEN MAMMO DOC REV: CPT | Performed by: FAMILY MEDICINE

## 2018-04-16 PROCEDURE — 83036 HEMOGLOBIN GLYCOSYLATED A1C: CPT | Performed by: FAMILY MEDICINE

## 2018-04-16 PROCEDURE — 3017F COLORECTAL CA SCREEN DOC REV: CPT | Performed by: FAMILY MEDICINE

## 2018-04-16 PROCEDURE — 1090F PRES/ABSN URINE INCON ASSESS: CPT | Performed by: FAMILY MEDICINE

## 2018-04-16 PROCEDURE — 99214 OFFICE O/P EST MOD 30 MIN: CPT | Performed by: FAMILY MEDICINE

## 2018-04-16 PROCEDURE — 2022F DILAT RTA XM EVC RTNOPTHY: CPT | Performed by: FAMILY MEDICINE

## 2018-04-16 PROCEDURE — 1036F TOBACCO NON-USER: CPT | Performed by: FAMILY MEDICINE

## 2018-04-16 PROCEDURE — G8427 DOCREV CUR MEDS BY ELIG CLIN: HCPCS | Performed by: FAMILY MEDICINE

## 2018-04-16 PROCEDURE — 1123F ACP DISCUSS/DSCN MKR DOCD: CPT | Performed by: FAMILY MEDICINE

## 2018-04-16 PROCEDURE — 3045F PR MOST RECENT HEMOGLOBIN A1C LEVEL 7.0-9.0%: CPT | Performed by: FAMILY MEDICINE

## 2018-04-16 RX ORDER — TRIAMTERENE AND HYDROCHLOROTHIAZIDE 37.5; 25 MG/1; MG/1
TABLET ORAL
Qty: 90 TABLET | Refills: 1 | Status: SHIPPED | OUTPATIENT
Start: 2018-04-16 | End: 2019-01-02 | Stop reason: SDUPTHER

## 2018-04-16 RX ORDER — POTASSIUM CHLORIDE 750 MG/1
10 TABLET, EXTENDED RELEASE ORAL DAILY
Qty: 90 TABLET | Refills: 1 | Status: SHIPPED | OUTPATIENT
Start: 2018-04-16 | End: 2019-01-20 | Stop reason: SDUPTHER

## 2018-04-16 ASSESSMENT — ENCOUNTER SYMPTOMS
BLOOD IN STOOL: 0
DIARRHEA: 0
SHORTNESS OF BREATH: 0
ABDOMINAL PAIN: 0
CONSTIPATION: 0

## 2018-06-04 DIAGNOSIS — Z79.4 TYPE 2 DIABETES MELLITUS WITH OTHER SPECIFIED COMPLICATION, WITH LONG-TERM CURRENT USE OF INSULIN (HCC): ICD-10-CM

## 2018-06-04 DIAGNOSIS — E11.69 TYPE 2 DIABETES MELLITUS WITH OTHER SPECIFIED COMPLICATION, WITH LONG-TERM CURRENT USE OF INSULIN (HCC): ICD-10-CM

## 2018-07-05 ENCOUNTER — TELEPHONE (OUTPATIENT)
Dept: INTERNAL MEDICINE CLINIC | Age: 68
End: 2018-07-05

## 2018-07-05 DIAGNOSIS — N39.0 ACUTE UTI: Primary | ICD-10-CM

## 2018-07-05 LAB
BILIRUBIN, POC: NEGATIVE
BLOOD URINE, POC: NEGATIVE
CLARITY, POC: CLEAR
COLOR, POC: YELLOW
GLUCOSE URINE, POC: NEGATIVE
KETONES, POC: NEGATIVE
LEUKOCYTE EST, POC: ABNORMAL
NITRITE, POC: NEGATIVE
PH, POC: 5
PROTEIN, POC: NEGATIVE
SPECIFIC GRAVITY, POC: 1.01
UROBILINOGEN, POC: NEGATIVE

## 2018-07-05 PROCEDURE — 81002 URINALYSIS NONAUTO W/O SCOPE: CPT | Performed by: FAMILY MEDICINE

## 2018-07-05 RX ORDER — PHENAZOPYRIDINE HYDROCHLORIDE 200 MG/1
200 TABLET, FILM COATED ORAL 3 TIMES DAILY PRN
Qty: 10 TABLET | Refills: 0 | Status: SHIPPED | OUTPATIENT
Start: 2018-07-05 | End: 2018-07-08

## 2018-07-05 RX ORDER — CIPROFLOXACIN 250 MG/1
250 TABLET, FILM COATED ORAL 2 TIMES DAILY
Qty: 6 TABLET | Refills: 0 | Status: SHIPPED | OUTPATIENT
Start: 2018-07-05 | End: 2018-07-08

## 2018-07-05 NOTE — TELEPHONE ENCOUNTER
UA positive for small Leukocytes. Per Dr Memo Bryant scribed to pharmacy  The patient has been notified of this information and all questions answered.

## 2018-07-05 NOTE — TELEPHONE ENCOUNTER
Has the urge to go all the time, and then only a little comes out. Please call her back on her cell phone.  [facility] [place]

## 2018-08-08 ENCOUNTER — OFFICE VISIT (OUTPATIENT)
Dept: INTERNAL MEDICINE CLINIC | Age: 68
End: 2018-08-08

## 2018-08-08 VITALS
OXYGEN SATURATION: 91 % | WEIGHT: 293 LBS | BODY MASS INDEX: 52.85 KG/M2 | HEART RATE: 80 BPM | RESPIRATION RATE: 18 BRPM | DIASTOLIC BLOOD PRESSURE: 66 MMHG | SYSTOLIC BLOOD PRESSURE: 129 MMHG

## 2018-08-08 DIAGNOSIS — Z79.4 TYPE 2 DIABETES MELLITUS WITHOUT COMPLICATION, WITH LONG-TERM CURRENT USE OF INSULIN (HCC): Primary | ICD-10-CM

## 2018-08-08 DIAGNOSIS — N32.81 OAB (OVERACTIVE BLADDER): ICD-10-CM

## 2018-08-08 DIAGNOSIS — E11.9 TYPE 2 DIABETES MELLITUS WITHOUT COMPLICATION, WITH LONG-TERM CURRENT USE OF INSULIN (HCC): Primary | ICD-10-CM

## 2018-08-08 DIAGNOSIS — R35.0 URINARY FREQUENCY: ICD-10-CM

## 2018-08-08 DIAGNOSIS — E66.01 MORBID OBESITY DUE TO EXCESS CALORIES (HCC): ICD-10-CM

## 2018-08-08 DIAGNOSIS — I10 ESSENTIAL HYPERTENSION: ICD-10-CM

## 2018-08-08 LAB
BILIRUBIN, POC: NEGATIVE
BLOOD URINE, POC: NEGATIVE
CLARITY, POC: NORMAL
COLOR, POC: NORMAL
GLUCOSE URINE, POC: NEGATIVE
HBA1C MFR BLD: 7.7 %
KETONES, POC: NEGATIVE
LEUKOCYTE EST, POC: NEGATIVE
NITRITE, POC: NEGATIVE
PH, POC: 5
PROTEIN, POC: NORMAL
SPECIFIC GRAVITY, POC: 1.02
UROBILINOGEN, POC: 0.2

## 2018-08-08 PROCEDURE — 1036F TOBACCO NON-USER: CPT | Performed by: FAMILY MEDICINE

## 2018-08-08 PROCEDURE — 3045F PR MOST RECENT HEMOGLOBIN A1C LEVEL 7.0-9.0%: CPT | Performed by: FAMILY MEDICINE

## 2018-08-08 PROCEDURE — G8427 DOCREV CUR MEDS BY ELIG CLIN: HCPCS | Performed by: FAMILY MEDICINE

## 2018-08-08 PROCEDURE — G8399 PT W/DXA RESULTS DOCUMENT: HCPCS | Performed by: FAMILY MEDICINE

## 2018-08-08 PROCEDURE — 3017F COLORECTAL CA SCREEN DOC REV: CPT | Performed by: FAMILY MEDICINE

## 2018-08-08 PROCEDURE — 99214 OFFICE O/P EST MOD 30 MIN: CPT | Performed by: FAMILY MEDICINE

## 2018-08-08 PROCEDURE — 83036 HEMOGLOBIN GLYCOSYLATED A1C: CPT | Performed by: FAMILY MEDICINE

## 2018-08-08 PROCEDURE — 4040F PNEUMOC VAC/ADMIN/RCVD: CPT | Performed by: FAMILY MEDICINE

## 2018-08-08 PROCEDURE — 1101F PT FALLS ASSESS-DOCD LE1/YR: CPT | Performed by: FAMILY MEDICINE

## 2018-08-08 PROCEDURE — 81002 URINALYSIS NONAUTO W/O SCOPE: CPT | Performed by: FAMILY MEDICINE

## 2018-08-08 PROCEDURE — 1090F PRES/ABSN URINE INCON ASSESS: CPT | Performed by: FAMILY MEDICINE

## 2018-08-08 PROCEDURE — G8417 CALC BMI ABV UP PARAM F/U: HCPCS | Performed by: FAMILY MEDICINE

## 2018-08-08 PROCEDURE — 2022F DILAT RTA XM EVC RTNOPTHY: CPT | Performed by: FAMILY MEDICINE

## 2018-08-08 PROCEDURE — 1123F ACP DISCUSS/DSCN MKR DOCD: CPT | Performed by: FAMILY MEDICINE

## 2018-08-08 RX ORDER — OXYBUTYNIN CHLORIDE 10 MG/1
10 TABLET, EXTENDED RELEASE ORAL DAILY
Qty: 30 TABLET | Refills: 3 | Status: SHIPPED | OUTPATIENT
Start: 2018-08-08 | End: 2018-10-29

## 2018-08-08 ASSESSMENT — ENCOUNTER SYMPTOMS
EYE DISCHARGE: 0
SORE THROAT: 0
ORTHOPNEA: 0
COUGH: 0
VOMITING: 0
WHEEZING: 0
NAUSEA: 0
DIARRHEA: 0
EYE REDNESS: 0
BACK PAIN: 0
BLURRED VISION: 0
SHORTNESS OF BREATH: 0

## 2018-08-08 NOTE — PROGRESS NOTES
Discussed advanced directives with patient. They state that they do not have anything in place at this time. I gave them blank copies of 1) DNR form, 2) [address], 3) Power of [employer], and 4) Organ donor registration. I also gave them instruction information for these. Asked that at this point, they take them home, review the information, and their practitioner will discuss this further with them at the next office visit.
and time. Skin: Skin is warm. No rash noted. Psychiatric: She has a normal mood and affect. Her behavior is normal.       ASSESSMENT/PLAN:  1. Type 2 diabetes mellitus without complication, with long-term current use of insulin (formerly [facility])  Controlled. HbA1c today 7.7%. . Continue current medication  - POCT glycosylated hemoglobin (Hb A1C)    2. OAB (overactive bladder)  Urinalysis was negative for infection or for glucose. Urinary frequency is  likely due to stress or urge incontinence or OAB. We will try due to pan XL 10 mg daily    3. Urinary frequency  Negative UA. Negative glucose in the urine. Diabetes which is fairly controlled does not appear to be related to urinary frequency  - POCT Urinalysis no Micro    4. Essential hypertension  Controlled. Continue low-salt diet. Blood pressure today is 129/66 without medication. But due to underlying diabetes will consider adding low-dose ARB next visit -losartan 25 mg poor renal protection    5. Morbid obesity due to excess calories ([address]  Encouraged to lose weight with diet and exercise      Follow-up in 3 mos.   Will give Prevnar 13 next visit    An electronic signature was used to authenticate this note.    --Liv Long MD on 8/8/2018 at 4:41 PM

## 2018-08-20 DIAGNOSIS — E11.9 TYPE 2 DIABETES MELLITUS WITHOUT COMPLICATION, WITH LONG-TERM CURRENT USE OF INSULIN (HCC): Primary | ICD-10-CM

## 2018-08-20 DIAGNOSIS — Z79.4 TYPE 2 DIABETES MELLITUS WITHOUT COMPLICATION, WITH LONG-TERM CURRENT USE OF INSULIN (HCC): Primary | ICD-10-CM

## 2018-08-20 NOTE — TELEPHONE ENCOUNTER
In the donut hole. Doesn't  Qualify for any help make to much money. Could get and RX for [facility] brand of Levemir and Novolog?

## 2018-08-21 RX ORDER — BLOOD SUGAR DIAGNOSTIC
1 STRIP MISCELLANEOUS 2 TIMES DAILY
Qty: 100 EACH | Refills: 3 | Status: SHIPPED | OUTPATIENT
Start: 2018-08-21 | End: 2020-02-24 | Stop reason: SDUPTHER

## 2018-08-21 NOTE — TELEPHONE ENCOUNTER
DINO.OCesar Per Dr Sarah Flynn:  Change Levemir 70 units daily to Humulin N 40 units in AM; 30 units in PM.       New Rx sent to [facility].

## 2018-09-26 ENCOUNTER — PRE-EVALUATION (OUTPATIENT)
Dept: ORTHOPEDIC SURGERY | Age: 68
End: 2018-09-26

## 2018-09-26 ENCOUNTER — OFFICE VISIT (OUTPATIENT)
Dept: ORTHOPEDIC SURGERY | Age: 68
End: 2018-09-26
Payer: MEDICARE

## 2018-09-26 VITALS
BODY MASS INDEX: 48.82 KG/M2 | WEIGHT: 293 LBS | SYSTOLIC BLOOD PRESSURE: 139 MMHG | DIASTOLIC BLOOD PRESSURE: 73 MMHG | HEART RATE: 87 BPM | HEIGHT: 65 IN

## 2018-09-26 DIAGNOSIS — M25.532 LEFT WRIST PAIN: ICD-10-CM

## 2018-09-26 DIAGNOSIS — M67.40 GANGLION CYST: ICD-10-CM

## 2018-09-26 DIAGNOSIS — M25.532 LEFT WRIST PAIN: Primary | ICD-10-CM

## 2018-09-26 DIAGNOSIS — M65.342 TRIGGER RING FINGER OF LEFT HAND: ICD-10-CM

## 2018-09-26 PROCEDURE — 1090F PRES/ABSN URINE INCON ASSESS: CPT | Performed by: ORTHOPAEDIC SURGERY

## 2018-09-26 PROCEDURE — 99213 OFFICE O/P EST LOW 20 MIN: CPT | Performed by: ORTHOPAEDIC SURGERY

## 2018-09-26 PROCEDURE — APPNB30 APP NON BILLABLE TIME 0-30 MINS: Performed by: PHYSICIAN ASSISTANT

## 2018-09-26 PROCEDURE — G8427 DOCREV CUR MEDS BY ELIG CLIN: HCPCS | Performed by: ORTHOPAEDIC SURGERY

## 2018-09-26 PROCEDURE — 3017F COLORECTAL CA SCREEN DOC REV: CPT | Performed by: ORTHOPAEDIC SURGERY

## 2018-09-26 PROCEDURE — 1101F PT FALLS ASSESS-DOCD LE1/YR: CPT | Performed by: ORTHOPAEDIC SURGERY

## 2018-09-26 PROCEDURE — L3908 WHO COCK-UP NONMOLDE PRE OTS: HCPCS | Performed by: ORTHOPAEDIC SURGERY

## 2018-09-26 PROCEDURE — G8417 CALC BMI ABV UP PARAM F/U: HCPCS | Performed by: ORTHOPAEDIC SURGERY

## 2018-09-26 NOTE — Clinical Note
Dear  Ngozi Weiss MD,  Thank you very much for your referral or Ms. Dania Baez to me for evaluation and treatment of her Hand & Wrist condition. I appreciate your confidence in me and thank you for allowing me the opportunity to care for your patients. If I can be of any further assistance to you on this or any other patient, please do not hesitate to contact me. Sincerely,  Sue Keller.  Carlos Eduardo Arenas MD

## 2018-09-26 NOTE — PROGRESS NOTES
Ms. Art Delgadillo is a 79 y.o. right handed individual  who is seen today in Hand Surgical Consultation at the request of Leonardo Kim MD.    She presents today regarding left sided wrist symptoms which have been present for approximately 1 months. A history of antecedent trauma or injury is Present with accidentally bumping her wrist against something. She reports a mass on the  Dorsal wrist with symptoms that include pain at the extremes of position &  limitation of motion. Wrist symptoms are exacerbated with certain gripping  or weight bearing activities. The size of the mass/swelling has been stable with time and change in activity level. Previous treatment has included none. She does not claim relation of her symptoms to her required work activities. She has not undergone any form of testing. The patient's , past medical history, medications, allergies,  family history, social history, and review of systems have been reviewed, and dated and are recorded in the chart. Physical Exam:  Ms. Kate Servin most recent vitals:  Vitals  BP: 139/73  Pulse: 87  Height: 5' 5\" (165.1 cm)  Weight: (!) 312 lb (141.5 kg)    She is well nourished, oriented to person, place & time. She demonstrates appropriate mood and affect as well as normal gait and station. Skin: Skin color, texture, turgor normal. No rashes or lesions bilaterally  Digital range of motion is full and equal bilateral bilaterally  Wrist range of motion is limited by pain on the Left, normal on the Right  Sensation is normal bilaterally  Vascular examination reveals normal, good capillary refill and good color bilaterally  There is mild Swelling on the Left, normal on the Right  There is no evidence of gross joint instability bilaterally. Muscular strength is clinically appropriate bilaterally. There is a 1.5 cm soft mass on the  dorsal wrist which is mobile and associated with the digital extensor tendons.    The mass is mildly

## 2018-10-29 ENCOUNTER — OFFICE VISIT (OUTPATIENT)
Dept: INTERNAL MEDICINE CLINIC | Age: 68
End: 2018-10-29
Payer: MEDICARE

## 2018-10-29 VITALS
HEART RATE: 70 BPM | SYSTOLIC BLOOD PRESSURE: 161 MMHG | OXYGEN SATURATION: 96 % | WEIGHT: 293 LBS | DIASTOLIC BLOOD PRESSURE: 75 MMHG | RESPIRATION RATE: 18 BRPM | BODY MASS INDEX: 52.25 KG/M2

## 2018-10-29 DIAGNOSIS — I10 ESSENTIAL HYPERTENSION: Primary | ICD-10-CM

## 2018-10-29 DIAGNOSIS — E66.01 MORBID OBESITY DUE TO EXCESS CALORIES (HCC): ICD-10-CM

## 2018-10-29 DIAGNOSIS — Z12.11 COLON CANCER SCREENING: ICD-10-CM

## 2018-10-29 DIAGNOSIS — Z23 NEED FOR PNEUMOCOCCAL VACCINATION: ICD-10-CM

## 2018-10-29 DIAGNOSIS — N32.81 OAB (OVERACTIVE BLADDER): ICD-10-CM

## 2018-10-29 DIAGNOSIS — E11.9 TYPE 2 DIABETES MELLITUS WITHOUT COMPLICATION, WITH LONG-TERM CURRENT USE OF INSULIN (HCC): ICD-10-CM

## 2018-10-29 DIAGNOSIS — Z79.4 TYPE 2 DIABETES MELLITUS WITHOUT COMPLICATION, WITH LONG-TERM CURRENT USE OF INSULIN (HCC): ICD-10-CM

## 2018-10-29 LAB
CONTROL: PRESENT
CREATININE URINE POCT: 111.1
HBA1C MFR BLD: 8 %
HEMOCCULT STL QL: NEGATIVE
MICROALBUMIN/CREAT 24H UR: 7.3 MG/G{CREAT}
MICROALBUMIN/CREAT UR-RTO: 6.6

## 2018-10-29 PROCEDURE — 2022F DILAT RTA XM EVC RTNOPTHY: CPT | Performed by: FAMILY MEDICINE

## 2018-10-29 PROCEDURE — 1101F PT FALLS ASSESS-DOCD LE1/YR: CPT | Performed by: FAMILY MEDICINE

## 2018-10-29 PROCEDURE — G8484 FLU IMMUNIZE NO ADMIN: HCPCS | Performed by: FAMILY MEDICINE

## 2018-10-29 PROCEDURE — 1036F TOBACCO NON-USER: CPT | Performed by: FAMILY MEDICINE

## 2018-10-29 PROCEDURE — 1123F ACP DISCUSS/DSCN MKR DOCD: CPT | Performed by: FAMILY MEDICINE

## 2018-10-29 PROCEDURE — 4040F PNEUMOC VAC/ADMIN/RCVD: CPT | Performed by: FAMILY MEDICINE

## 2018-10-29 PROCEDURE — 82044 UR ALBUMIN SEMIQUANTITATIVE: CPT | Performed by: FAMILY MEDICINE

## 2018-10-29 PROCEDURE — G8399 PT W/DXA RESULTS DOCUMENT: HCPCS | Performed by: FAMILY MEDICINE

## 2018-10-29 PROCEDURE — G8427 DOCREV CUR MEDS BY ELIG CLIN: HCPCS | Performed by: FAMILY MEDICINE

## 2018-10-29 PROCEDURE — 1090F PRES/ABSN URINE INCON ASSESS: CPT | Performed by: FAMILY MEDICINE

## 2018-10-29 PROCEDURE — 3045F PR MOST RECENT HEMOGLOBIN A1C LEVEL 7.0-9.0%: CPT | Performed by: FAMILY MEDICINE

## 2018-10-29 PROCEDURE — 3017F COLORECTAL CA SCREEN DOC REV: CPT | Performed by: FAMILY MEDICINE

## 2018-10-29 PROCEDURE — G8417 CALC BMI ABV UP PARAM F/U: HCPCS | Performed by: FAMILY MEDICINE

## 2018-10-29 PROCEDURE — 82274 ASSAY TEST FOR BLOOD FECAL: CPT | Performed by: FAMILY MEDICINE

## 2018-10-29 PROCEDURE — 83036 HEMOGLOBIN GLYCOSYLATED A1C: CPT | Performed by: FAMILY MEDICINE

## 2018-10-29 PROCEDURE — 99214 OFFICE O/P EST MOD 30 MIN: CPT | Performed by: FAMILY MEDICINE

## 2018-10-29 PROCEDURE — G0009 ADMIN PNEUMOCOCCAL VACCINE: HCPCS | Performed by: FAMILY MEDICINE

## 2018-10-29 PROCEDURE — 90670 PCV13 VACCINE IM: CPT | Performed by: FAMILY MEDICINE

## 2018-10-29 RX ORDER — LISINOPRIL 5 MG/1
5 TABLET ORAL DAILY
Qty: 30 TABLET | Refills: 3 | Status: SHIPPED | OUTPATIENT
Start: 2018-10-29 | End: 2019-01-28 | Stop reason: SINTOL

## 2018-10-29 ASSESSMENT — PATIENT HEALTH QUESTIONNAIRE - PHQ9
SUM OF ALL RESPONSES TO PHQ9 QUESTIONS 1 & 2: 0
SUM OF ALL RESPONSES TO PHQ QUESTIONS 1-9: 0
SUM OF ALL RESPONSES TO PHQ QUESTIONS 1-9: 0
1. LITTLE INTEREST OR PLEASURE IN DOING THINGS: 0
2. FEELING DOWN, DEPRESSED OR HOPELESS: 0

## 2018-10-29 ASSESSMENT — ENCOUNTER SYMPTOMS
SHORTNESS OF BREATH: 0
CONSTIPATION: 0
BLOOD IN STOOL: 0
DIARRHEA: 0
ABDOMINAL PAIN: 0

## 2018-10-29 NOTE — PROGRESS NOTES
10/29/2018     Dania Baez (:  1950) is a 79 y.o. female, here for evaluation of the following medical concerns:    HPI   Patient presented to the office for follow-up. She has a issue with the he diabetes due to the cost of the insulin. He tried NPH insulin 30 units twice a day but her \" blood sugar is all over the place\". So she decided to go back to the Levemir insulin though it's expensive. She is hoping that once she is through [facility] it might be somewhat cheaper  Diabetes Mellitus Type 2: Current symptoms/problems include none. Medication compliance:  compliant most of the time  Diabetic diet compliance:  compliant most of the time,  Weight trend: stable  Current exercise: no regular exercise  Barriers: lack of motivation    Home blood sugar records: patient does not test  Any episodes of hypoglycemia? no  Eye exam current (within one year): unknown   reports that she quit smoking about 22 years ago. Her smoking use included Cigarettes. She has a 10.00 pack-year smoking history. She has never used smokeless tobacco.   Daily Aspirin? Yes    Lab Results   Component Value Date    LABA1C 7.7 2018    LABA1C 8.5 2018    LABA1C 8.3 2018     Lab Results   Component Value Date    LABMICR Not Indicated 2016    CREATININE 1.1 2018     Lab Results   Component Value Date    ALT 12 2017    AST 16 2017     Lab Results   Component Value Date    CHOL 147 2017    TRIG 72 2017    HDL 63 (H) 2017    LDLCALC 70 2017        Hypertension:  Home blood pressure monitoring: No.  She is adherent to a low sodium diet. Patient denies chest pain and shortness of breath. Antihypertensive medication side effects: no medication side effects noted. Use of agents associated with hypertension: none.                                         Sodium (mmol/L)   Date Value   2018 140    BUN (mg/dL)   Date Value   2018 18    Glucose (mg/dL)   Date 30 units in PM  Trang Carl MD   Insulin Syringe-Needle U-100 31G X 5/16\" 1 ML MISC 1 each by Does not apply route 2 times daily  Trang Carl MD   glucose blood VI test strips (ASCENSIA AUTODISC VI;ONE TOUCH ULTRA TEST VI) strip Test fsbs QID  Trang Carl MD        Social History   Substance Use Topics    Smoking status: Former Smoker     Packs/day: 0.50     Years: 20.00     Types: Cigarettes     Quit date: 3/1/1996    Smokeless tobacco: Never Used    Alcohol use No        Vitals:    10/29/18 1539 10/29/18 1611   BP: (!) 158/70 (!) 161/75   Pulse: 70    Resp: 18    SpO2: 96%    Weight: (!) 314 lb (142.4 kg)      Estimated body mass index is 52.25 kg/m² as calculated from the following:    Height as of 9/26/18: 5' 5\" (1.651 m). Weight as of this encounter: 314 lb (142.4 kg). Physical Exam   Constitutional: She is oriented to person, place, and time. She appears well-developed and well-nourished. No distress. HENT:   Head: Normocephalic. Eyes: Conjunctivae are normal.   Neck: Neck supple. No thyromegaly present. Cardiovascular: Normal rate, regular rhythm and normal heart sounds. No murmur heard. Pulmonary/Chest: Breath sounds normal. No respiratory distress. She has no wheezes. She has no rales. Abdominal: Soft. She exhibits no distension. Musculoskeletal: Normal range of motion. She exhibits no edema. Neurological: She is alert and oriented to person, place, and time. Skin: Skin is warm. No rash noted. Psychiatric: She has a normal mood and affect. Her behavior is normal.   Bilateral foot: normal microcirculation and capillary reflow. Neurovascular exam normal.      ASSESSMENT/PLAN:  1. Essential hypertension  Blood pressure is still elevated. Will add lisinopril 5 mg daily. Continue Maxide    2. Type 2 diabetes mellitus without complication, with long-term current use of insulin (HCC)  Uncontrolled due to poor compliance with insulin.  Complain of the prohibitive cost of

## 2018-12-03 DIAGNOSIS — Z79.4 TYPE 2 DIABETES MELLITUS WITH OTHER SPECIFIED COMPLICATION, WITH LONG-TERM CURRENT USE OF INSULIN (HCC): ICD-10-CM

## 2018-12-03 DIAGNOSIS — E11.69 TYPE 2 DIABETES MELLITUS WITH OTHER SPECIFIED COMPLICATION, WITH LONG-TERM CURRENT USE OF INSULIN (HCC): ICD-10-CM

## 2018-12-03 RX ORDER — BLOOD SUGAR DIAGNOSTIC
STRIP MISCELLANEOUS
Qty: 150 STRIP | Refills: 3 | Status: SHIPPED | OUTPATIENT
Start: 2018-12-03 | End: 2019-06-15 | Stop reason: SDUPTHER

## 2019-01-02 DIAGNOSIS — I10 ESSENTIAL HYPERTENSION: ICD-10-CM

## 2019-01-02 RX ORDER — TRIAMTERENE AND HYDROCHLOROTHIAZIDE 37.5; 25 MG/1; MG/1
1 TABLET ORAL DAILY
Qty: 90 TABLET | Refills: 1 | Status: SHIPPED | OUTPATIENT
Start: 2019-01-02 | End: 2019-07-12 | Stop reason: SDUPTHER

## 2019-01-21 RX ORDER — POTASSIUM CHLORIDE 750 MG/1
10 TABLET, FILM COATED, EXTENDED RELEASE ORAL DAILY
Qty: 90 TABLET | Refills: 1 | Status: SHIPPED | OUTPATIENT
Start: 2019-01-21 | End: 2019-08-26 | Stop reason: SDUPTHER

## 2019-01-28 ENCOUNTER — OFFICE VISIT (OUTPATIENT)
Dept: INTERNAL MEDICINE CLINIC | Age: 69
End: 2019-01-28
Payer: MEDICARE

## 2019-01-28 VITALS
WEIGHT: 293 LBS | SYSTOLIC BLOOD PRESSURE: 132 MMHG | HEART RATE: 73 BPM | RESPIRATION RATE: 18 BRPM | DIASTOLIC BLOOD PRESSURE: 69 MMHG | BODY MASS INDEX: 51.59 KG/M2

## 2019-01-28 DIAGNOSIS — Z79.4 TYPE 2 DIABETES MELLITUS WITHOUT COMPLICATION, WITH LONG-TERM CURRENT USE OF INSULIN (HCC): Primary | ICD-10-CM

## 2019-01-28 DIAGNOSIS — I10 ESSENTIAL HYPERTENSION: ICD-10-CM

## 2019-01-28 DIAGNOSIS — R06.09 DYSPNEA ON EXERTION: ICD-10-CM

## 2019-01-28 DIAGNOSIS — R53.82 CHRONIC FATIGUE: ICD-10-CM

## 2019-01-28 DIAGNOSIS — E11.9 TYPE 2 DIABETES MELLITUS WITHOUT COMPLICATION, WITH LONG-TERM CURRENT USE OF INSULIN (HCC): Primary | ICD-10-CM

## 2019-01-28 DIAGNOSIS — E66.01 MORBID OBESITY DUE TO EXCESS CALORIES (HCC): ICD-10-CM

## 2019-01-28 LAB — HBA1C MFR BLD: 8 %

## 2019-01-28 PROCEDURE — 1101F PT FALLS ASSESS-DOCD LE1/YR: CPT | Performed by: FAMILY MEDICINE

## 2019-01-28 PROCEDURE — 83036 HEMOGLOBIN GLYCOSYLATED A1C: CPT | Performed by: FAMILY MEDICINE

## 2019-01-28 PROCEDURE — 1123F ACP DISCUSS/DSCN MKR DOCD: CPT | Performed by: FAMILY MEDICINE

## 2019-01-28 PROCEDURE — 1036F TOBACCO NON-USER: CPT | Performed by: FAMILY MEDICINE

## 2019-01-28 PROCEDURE — 4040F PNEUMOC VAC/ADMIN/RCVD: CPT | Performed by: FAMILY MEDICINE

## 2019-01-28 PROCEDURE — G8399 PT W/DXA RESULTS DOCUMENT: HCPCS | Performed by: FAMILY MEDICINE

## 2019-01-28 PROCEDURE — 3017F COLORECTAL CA SCREEN DOC REV: CPT | Performed by: FAMILY MEDICINE

## 2019-01-28 PROCEDURE — G8427 DOCREV CUR MEDS BY ELIG CLIN: HCPCS | Performed by: FAMILY MEDICINE

## 2019-01-28 PROCEDURE — 99214 OFFICE O/P EST MOD 30 MIN: CPT | Performed by: FAMILY MEDICINE

## 2019-01-28 PROCEDURE — G8417 CALC BMI ABV UP PARAM F/U: HCPCS | Performed by: FAMILY MEDICINE

## 2019-01-28 PROCEDURE — 3045F PR MOST RECENT HEMOGLOBIN A1C LEVEL 7.0-9.0%: CPT | Performed by: FAMILY MEDICINE

## 2019-01-28 PROCEDURE — 1090F PRES/ABSN URINE INCON ASSESS: CPT | Performed by: FAMILY MEDICINE

## 2019-01-28 PROCEDURE — G8484 FLU IMMUNIZE NO ADMIN: HCPCS | Performed by: FAMILY MEDICINE

## 2019-01-28 PROCEDURE — 2022F DILAT RTA XM EVC RTNOPTHY: CPT | Performed by: FAMILY MEDICINE

## 2019-01-28 RX ORDER — LISINOPRIL 5 MG/1
5 TABLET ORAL DAILY
Qty: 90 TABLET | Refills: 3 | Status: SHIPPED | OUTPATIENT
Start: 2019-01-28 | End: 2019-04-29

## 2019-01-28 ASSESSMENT — ENCOUNTER SYMPTOMS
BLOOD IN STOOL: 0
ABDOMINAL PAIN: 0
CONSTIPATION: 0
DIARRHEA: 0
SHORTNESS OF BREATH: 0

## 2019-02-11 ENCOUNTER — HOSPITAL ENCOUNTER (OUTPATIENT)
Dept: NON INVASIVE DIAGNOSTICS | Age: 69
Discharge: HOME | End: 2019-02-11
Payer: MEDICARE

## 2019-02-11 LAB
LV EF: 63 %
LVEF MODALITY: NORMAL

## 2019-02-11 PROCEDURE — 93306 TTE W/DOPPLER COMPLETE: CPT

## 2019-04-29 ENCOUNTER — OFFICE VISIT (OUTPATIENT)
Dept: INTERNAL MEDICINE CLINIC | Age: 69
End: 2019-04-29
Payer: MEDICARE

## 2019-04-29 VITALS
BODY MASS INDEX: 47.09 KG/M2 | WEIGHT: 293 LBS | DIASTOLIC BLOOD PRESSURE: 76 MMHG | HEART RATE: 82 BPM | HEIGHT: 66 IN | SYSTOLIC BLOOD PRESSURE: 123 MMHG

## 2019-04-29 DIAGNOSIS — E66.01 MORBID OBESITY DUE TO EXCESS CALORIES (HCC): ICD-10-CM

## 2019-04-29 DIAGNOSIS — E11.9 TYPE 2 DIABETES MELLITUS WITHOUT COMPLICATION, WITH LONG-TERM CURRENT USE OF INSULIN (HCC): Primary | ICD-10-CM

## 2019-04-29 DIAGNOSIS — Z79.4 TYPE 2 DIABETES MELLITUS WITHOUT COMPLICATION, WITH LONG-TERM CURRENT USE OF INSULIN (HCC): Primary | ICD-10-CM

## 2019-04-29 DIAGNOSIS — I10 ESSENTIAL HYPERTENSION: ICD-10-CM

## 2019-04-29 LAB — HBA1C MFR BLD: 8.3 %

## 2019-04-29 PROCEDURE — G8427 DOCREV CUR MEDS BY ELIG CLIN: HCPCS | Performed by: FAMILY MEDICINE

## 2019-04-29 PROCEDURE — 1036F TOBACCO NON-USER: CPT | Performed by: FAMILY MEDICINE

## 2019-04-29 PROCEDURE — 99214 OFFICE O/P EST MOD 30 MIN: CPT | Performed by: FAMILY MEDICINE

## 2019-04-29 PROCEDURE — G8399 PT W/DXA RESULTS DOCUMENT: HCPCS | Performed by: FAMILY MEDICINE

## 2019-04-29 PROCEDURE — 4040F PNEUMOC VAC/ADMIN/RCVD: CPT | Performed by: FAMILY MEDICINE

## 2019-04-29 PROCEDURE — 83036 HEMOGLOBIN GLYCOSYLATED A1C: CPT | Performed by: FAMILY MEDICINE

## 2019-04-29 PROCEDURE — 1090F PRES/ABSN URINE INCON ASSESS: CPT | Performed by: FAMILY MEDICINE

## 2019-04-29 PROCEDURE — 3045F PR MOST RECENT HEMOGLOBIN A1C LEVEL 7.0-9.0%: CPT | Performed by: FAMILY MEDICINE

## 2019-04-29 PROCEDURE — G8417 CALC BMI ABV UP PARAM F/U: HCPCS | Performed by: FAMILY MEDICINE

## 2019-04-29 PROCEDURE — 3017F COLORECTAL CA SCREEN DOC REV: CPT | Performed by: FAMILY MEDICINE

## 2019-04-29 PROCEDURE — 1123F ACP DISCUSS/DSCN MKR DOCD: CPT | Performed by: FAMILY MEDICINE

## 2019-04-29 PROCEDURE — 2022F DILAT RTA XM EVC RTNOPTHY: CPT | Performed by: FAMILY MEDICINE

## 2019-04-29 RX ORDER — LISINOPRIL 5 MG/1
5 TABLET ORAL DAILY
Qty: 90 TABLET | Refills: 3 | Status: SHIPPED | OUTPATIENT
Start: 2019-04-29 | End: 2019-11-22

## 2019-04-29 ASSESSMENT — PATIENT HEALTH QUESTIONNAIRE - PHQ9
SUM OF ALL RESPONSES TO PHQ QUESTIONS 1-9: 0
1. LITTLE INTEREST OR PLEASURE IN DOING THINGS: 0
2. FEELING DOWN, DEPRESSED OR HOPELESS: 0
SUM OF ALL RESPONSES TO PHQ9 QUESTIONS 1 & 2: 0
SUM OF ALL RESPONSES TO PHQ QUESTIONS 1-9: 0

## 2019-04-29 ASSESSMENT — ENCOUNTER SYMPTOMS
DIARRHEA: 0
SHORTNESS OF BREATH: 0
ABDOMINAL PAIN: 0
BLOOD IN STOOL: 0
CONSTIPATION: 0

## 2019-04-29 NOTE — PROGRESS NOTES
2019     Dania Baez (:  1950) is a 79 y.o. female, here for evaluation of the following medical concerns:    HPI  Diabetes Mellitus Type 2: Current symptoms/problems include none. Medication compliance:  compliant most of the time  Diabetic diet compliance:  compliant most of the time,  Weight trend: decreasing  Current exercise: no regular exercise  Barriers: none    Home blood sugar records: patient tests 2 time(s) per day  Any episodes of hypoglycemia? no  Eye exam current (within one year): unknown   reports that she quit smoking about 23 years ago. Her smoking use included cigarettes. She has a 10.00 pack-year smoking history. She has never used smokeless tobacco.   Daily Aspirin? Yes    Lab Results   Component Value Date    LABA1C 8.3 2019    LABA1C 8.0 2019    LABA1C 8.0 10/29/2018     Lab Results   Component Value Date    LABMICR Not Indicated 2016    CREATININE 1.1 2018     Lab Results   Component Value Date    ALT 12 2017    AST 16 2017     Lab Results   Component Value Date    CHOL 147 2017    TRIG 72 2017    HDL 63 (H) 2017    LDLCALC 70 2017        Hypertension:  Home blood pressure monitoring: No.  She is adherent to a low sodium diet. Patient denies chest pain and shortness of breath. Antihypertensive medication side effects: no medication side effects noted and dry cough. Use of agents associated with hypertension: none. Sodium (mmol/L)   Date Value   2018 140    BUN (mg/dL)   Date Value   2018 18    Glucose (mg/dL)   Date Value   2018 221 (H)      Potassium (mmol/L)   Date Value   2018 4.0    CREATININE (mg/dL)   Date Value   2018 1.1           Review of Systems   Constitutional: Negative for activity change and appetite change. Eyes: Negative for visual disturbance. Respiratory: Negative for shortness of breath.     Cardiovascular: Negative (1.676 m). Weight as of this encounter: 298 lb (135.2 kg). Physical Exam   Constitutional: She is oriented to person, place, and time. She appears well-developed and well-nourished. HENT:   Head: Normocephalic. Right Ear: External ear normal.   Nose: Nose normal.   Mouth/Throat: Oropharynx is clear and moist.   Eyes: Pupils are equal, round, and reactive to light. Conjunctivae are normal.   Neck: Normal range of motion. Neck supple. No thyromegaly present. Cardiovascular: Normal rate, regular rhythm and normal heart sounds. Exam reveals no friction rub. No murmur heard. Pulmonary/Chest: Effort normal and breath sounds normal.   Abdominal: Soft. Bowel sounds are normal. She exhibits no mass. Musculoskeletal: Normal range of motion. Lymphadenopathy:     She has no cervical adenopathy. Neurological: She is alert and oriented to person, place, and time. Skin: Skin is warm. No rash noted. Psychiatric: She has a normal mood and affect. ASSESSMENT/PLAN:  1. Type 2 diabetes mellitus without complication, with long-term current use of insulin ([address]  She stated that she low blood sugar at night at around midnight with BS as low as 66. So will reduce Novolin R from 20 units to 18 units at supper time but continue 10 units at breakfast and 15 units at lunch. To take Levemir insulin 70 units at breakfast instead of night time   - POCT glycosylated hemoglobin (Hb A1C)    2. Essential hypertension  Controlled. Continue Maxide and lisinopril    3.  Morbid obesity due to excess calories ([address]  Encouraged to lose weight with diet and exercise      RTC in 3 mos    An electronic signature was used to authenticate this note.    --Feli Cain MD on 4/29/2019 at 3:49 PM

## 2019-06-15 DIAGNOSIS — Z79.4 TYPE 2 DIABETES MELLITUS WITH OTHER SPECIFIED COMPLICATION, WITH LONG-TERM CURRENT USE OF INSULIN (HCC): ICD-10-CM

## 2019-06-15 DIAGNOSIS — E11.69 TYPE 2 DIABETES MELLITUS WITH OTHER SPECIFIED COMPLICATION, WITH LONG-TERM CURRENT USE OF INSULIN (HCC): ICD-10-CM

## 2019-06-15 RX ORDER — BLOOD SUGAR DIAGNOSTIC
STRIP MISCELLANEOUS
Qty: 150 STRIP | Refills: 3 | Status: SHIPPED | OUTPATIENT
Start: 2019-06-15 | End: 2019-11-26 | Stop reason: SDUPTHER

## 2019-07-12 DIAGNOSIS — I10 ESSENTIAL HYPERTENSION: ICD-10-CM

## 2019-07-15 RX ORDER — TRIAMTERENE AND HYDROCHLOROTHIAZIDE 37.5; 25 MG/1; MG/1
TABLET ORAL
Qty: 90 TABLET | Refills: 1 | Status: SHIPPED | OUTPATIENT
Start: 2019-07-15 | End: 2020-01-10

## 2019-08-12 ENCOUNTER — TELEPHONE (OUTPATIENT)
Dept: INTERNAL MEDICINE CLINIC | Age: 69
End: 2019-08-12

## 2019-08-26 ENCOUNTER — OFFICE VISIT (OUTPATIENT)
Dept: INTERNAL MEDICINE CLINIC | Age: 69
End: 2019-08-26
Payer: MEDICARE

## 2019-08-26 VITALS
DIASTOLIC BLOOD PRESSURE: 62 MMHG | OXYGEN SATURATION: 96 % | BODY MASS INDEX: 48.16 KG/M2 | HEART RATE: 69 BPM | WEIGHT: 293 LBS | RESPIRATION RATE: 18 BRPM | SYSTOLIC BLOOD PRESSURE: 136 MMHG

## 2019-08-26 DIAGNOSIS — E11.9 TYPE 2 DIABETES MELLITUS WITHOUT COMPLICATION, WITH LONG-TERM CURRENT USE OF INSULIN (HCC): Primary | ICD-10-CM

## 2019-08-26 DIAGNOSIS — Z12.39 BREAST CANCER SCREENING: ICD-10-CM

## 2019-08-26 DIAGNOSIS — I10 ESSENTIAL HYPERTENSION: ICD-10-CM

## 2019-08-26 DIAGNOSIS — E66.01 MORBID OBESITY DUE TO EXCESS CALORIES (HCC): ICD-10-CM

## 2019-08-26 DIAGNOSIS — Z79.4 TYPE 2 DIABETES MELLITUS WITHOUT COMPLICATION, WITH LONG-TERM CURRENT USE OF INSULIN (HCC): Primary | ICD-10-CM

## 2019-08-26 LAB — HBA1C MFR BLD: 8.1 %

## 2019-08-26 PROCEDURE — 1036F TOBACCO NON-USER: CPT | Performed by: FAMILY MEDICINE

## 2019-08-26 PROCEDURE — G8427 DOCREV CUR MEDS BY ELIG CLIN: HCPCS | Performed by: FAMILY MEDICINE

## 2019-08-26 PROCEDURE — G8399 PT W/DXA RESULTS DOCUMENT: HCPCS | Performed by: FAMILY MEDICINE

## 2019-08-26 PROCEDURE — 4040F PNEUMOC VAC/ADMIN/RCVD: CPT | Performed by: FAMILY MEDICINE

## 2019-08-26 PROCEDURE — 3045F PR MOST RECENT HEMOGLOBIN A1C LEVEL 7.0-9.0%: CPT | Performed by: FAMILY MEDICINE

## 2019-08-26 PROCEDURE — G8417 CALC BMI ABV UP PARAM F/U: HCPCS | Performed by: FAMILY MEDICINE

## 2019-08-26 PROCEDURE — 2022F DILAT RTA XM EVC RTNOPTHY: CPT | Performed by: FAMILY MEDICINE

## 2019-08-26 PROCEDURE — 99214 OFFICE O/P EST MOD 30 MIN: CPT | Performed by: FAMILY MEDICINE

## 2019-08-26 PROCEDURE — 83036 HEMOGLOBIN GLYCOSYLATED A1C: CPT | Performed by: FAMILY MEDICINE

## 2019-08-26 PROCEDURE — 1123F ACP DISCUSS/DSCN MKR DOCD: CPT | Performed by: FAMILY MEDICINE

## 2019-08-26 PROCEDURE — 3017F COLORECTAL CA SCREEN DOC REV: CPT | Performed by: FAMILY MEDICINE

## 2019-08-26 PROCEDURE — 1090F PRES/ABSN URINE INCON ASSESS: CPT | Performed by: FAMILY MEDICINE

## 2019-08-26 RX ORDER — POTASSIUM CHLORIDE 750 MG/1
10 TABLET, FILM COATED, EXTENDED RELEASE ORAL DAILY
Qty: 90 TABLET | Refills: 1 | Status: SHIPPED | OUTPATIENT
Start: 2019-08-26 | End: 2020-04-09

## 2019-08-26 ASSESSMENT — ENCOUNTER SYMPTOMS
SHORTNESS OF BREATH: 0
DIARRHEA: 0
CONSTIPATION: 0
BLOOD IN STOOL: 0
ABDOMINAL PAIN: 0

## 2019-08-26 NOTE — PROGRESS NOTES
Gastrointestinal: Negative for abdominal pain, blood in stool, constipation and diarrhea. Genitourinary: Negative for difficulty urinating, frequency, hematuria, menstrual problem and urgency. Neurological: Negative for dizziness and syncope. Psychiatric/Behavioral: Negative for behavioral problems. Prior to Visit Medications    Medication Sig Taking? Authorizing Provider   potassium chloride (KLOR-CON 10) 10 MEQ extended release tablet Take 1 tablet by mouth daily Yes Josiane May MD   insulin regular (NOVOLIN R) 100 UNIT/ML injection Inject 10 units at breakfast  15 units at lunch and 20 units at supper Yes Josiane May MD   metFORMIN (GLUCOPHAGE) 1000 MG tablet Take 1 tablet by mouth daily (with breakfast) Yes Josiane May MD   triamterene-hydrochlorothiazide (MAXZIDE-25) 37.5-25 MG per tablet TAKE 1 TABLET BY MOUTH EVERY DAY Yes JANICE Marion CNP   RELION PRIME TEST strip USE AS DIRECTED 4 TIMES DAILY TO  CHECK  FASTING  BLOOD  SUGAR Yes Josiane May MD   lisinopril (PRINIVIL;ZESTRIL) 5 MG tablet Take 1 tablet by mouth daily Yes Josiane May MD   insulin detemir (LEVEMIR) 100 UNIT/ML injection vial Inject 70 Units into the skin nightly Yes Josiane May MD   Insulin Syringe-Needle U-100 31G X 5\" 1 ML MISC 1 each by Does not apply route 2 times daily Yes Josiane May MD        Social History     Tobacco Use    Smoking status: Former Smoker     Packs/day: 0.50     Years: 20.00     Pack years: 10.00     Types: Cigarettes     Last attempt to quit: 3/1/1996     Years since quittin.5    Smokeless tobacco: Never Used   Substance Use Topics    Alcohol use: No        Vitals:    19 1326   BP: 136/62   Pulse: 69   Resp: 18   SpO2: 96%   Weight: 298 lb 6.4 oz (135.4 kg)     Estimated body mass index is 48.16 kg/m² as calculated from the following:    Height as of 19: 5' 6\" (1.676 m).     Weight as of this encounter: 298 lb 6.4 oz (135.4 kg).    Physical Exam   Constitutional: She is oriented to person, place, and time. She appears well-developed and well-nourished. HENT:   Head: Normocephalic. Right Ear: External ear normal.   Nose: Nose normal.   Mouth/Throat: Oropharynx is clear and moist.   Eyes: Pupils are equal, round, and reactive to light. Conjunctivae are normal.   Neck: Normal range of motion. Neck supple. No thyromegaly present. Cardiovascular: Normal rate, regular rhythm and normal heart sounds. Exam reveals no friction rub. No murmur heard. Pulmonary/Chest: Effort normal and breath sounds normal.   Abdominal: Soft. Bowel sounds are normal. She exhibits no mass. Musculoskeletal: Normal range of motion. Lymphadenopathy:     She has no cervical adenopathy. Neurological: She is alert and oriented to person, place, and time. Skin: Skin is warm. No rash noted. Psychiatric: She has a normal mood and affect. ASSESSMENT/PLAN:  1. Type 2 diabetes mellitus without complication, with long-term current use of insulin ([facility])  HbA1c today is 8.1% which is still elevated but better than 3-4 months ago at 8.3%. Will increase Novolin R ( vial) at 10 units at breakfast 15 units at times and 20 units at supper. Continue Levemir insulin ( vial) at 70 units daily at breakfast.  She complained about the cost of Levemir insulin ( $ 300/ 3 mos) and agreeable to switch to Novolin N at 40 units breakfast and 30 units at supper. Continue metformin 1000 mg twice daily ( she was only taking it once a day)  - POCT glycosylated hemoglobin (Hb A1C)    2. Essential hypertension  Controlled. Continue Maxide at breakfast and restart lisinopril 5 mg at night. .( The 2 BP medicines taken at same time sometimes make her dizzy)    3. Morbid obesity due to excess calories ([address]  Encouraged to lose weight with diet and exercise. 4. Breast cancer screening  - [facility] DIGITAL SCREEN W CAD BILATERAL; Future      RTC in 2-3 mos for annual wellness visit . She is also due for blood test and flu vaccine when available.     An electronic signature was used to authenticate this note.    --Alyssa Yu MD on 8/26/2019 at 2:10 PM

## 2019-09-18 ENCOUNTER — HOSPITAL ENCOUNTER (OUTPATIENT)
Dept: MAMMOGRAPHY | Age: 69
Discharge: HOME | End: 2019-09-23
Payer: MEDICARE

## 2019-09-18 DIAGNOSIS — Z12.39 BREAST CANCER SCREENING: ICD-10-CM

## 2019-09-18 PROCEDURE — 77063 BREAST TOMOSYNTHESIS BI: CPT

## 2019-10-02 ENCOUNTER — OFFICE VISIT (OUTPATIENT)
Dept: ORTHOPEDIC SURGERY | Age: 69
End: 2019-10-02
Payer: MEDICARE

## 2019-10-02 VITALS
WEIGHT: 293 LBS | HEIGHT: 66 IN | SYSTOLIC BLOOD PRESSURE: 139 MMHG | BODY MASS INDEX: 47.09 KG/M2 | DIASTOLIC BLOOD PRESSURE: 67 MMHG | HEART RATE: 78 BPM

## 2019-10-02 DIAGNOSIS — G89.29 CHRONIC PAIN OF RIGHT KNEE: Primary | ICD-10-CM

## 2019-10-02 DIAGNOSIS — R60.0 BILATERAL LOWER EXTREMITY EDEMA: ICD-10-CM

## 2019-10-02 DIAGNOSIS — M25.561 CHRONIC PAIN OF RIGHT KNEE: Primary | ICD-10-CM

## 2019-10-02 PROCEDURE — 1036F TOBACCO NON-USER: CPT | Performed by: ORTHOPAEDIC SURGERY

## 2019-10-02 PROCEDURE — G8399 PT W/DXA RESULTS DOCUMENT: HCPCS | Performed by: ORTHOPAEDIC SURGERY

## 2019-10-02 PROCEDURE — 1090F PRES/ABSN URINE INCON ASSESS: CPT | Performed by: ORTHOPAEDIC SURGERY

## 2019-10-02 PROCEDURE — G8427 DOCREV CUR MEDS BY ELIG CLIN: HCPCS | Performed by: ORTHOPAEDIC SURGERY

## 2019-10-02 PROCEDURE — G8484 FLU IMMUNIZE NO ADMIN: HCPCS | Performed by: ORTHOPAEDIC SURGERY

## 2019-10-02 PROCEDURE — 3017F COLORECTAL CA SCREEN DOC REV: CPT | Performed by: ORTHOPAEDIC SURGERY

## 2019-10-02 PROCEDURE — G8417 CALC BMI ABV UP PARAM F/U: HCPCS | Performed by: ORTHOPAEDIC SURGERY

## 2019-10-02 PROCEDURE — 4040F PNEUMOC VAC/ADMIN/RCVD: CPT | Performed by: ORTHOPAEDIC SURGERY

## 2019-10-02 PROCEDURE — 1123F ACP DISCUSS/DSCN MKR DOCD: CPT | Performed by: ORTHOPAEDIC SURGERY

## 2019-10-02 PROCEDURE — 99213 OFFICE O/P EST LOW 20 MIN: CPT | Performed by: ORTHOPAEDIC SURGERY

## 2019-10-07 ENCOUNTER — OFFICE VISIT (OUTPATIENT)
Dept: INTERNAL MEDICINE CLINIC | Age: 69
End: 2019-10-07
Payer: MEDICARE

## 2019-10-07 VITALS
DIASTOLIC BLOOD PRESSURE: 73 MMHG | HEART RATE: 74 BPM | WEIGHT: 293 LBS | OXYGEN SATURATION: 98 % | RESPIRATION RATE: 18 BRPM | SYSTOLIC BLOOD PRESSURE: 139 MMHG | BODY MASS INDEX: 48.74 KG/M2

## 2019-10-07 DIAGNOSIS — E08.3591: ICD-10-CM

## 2019-10-07 DIAGNOSIS — Z01.818 PREOP EXAMINATION: Primary | ICD-10-CM

## 2019-10-07 DIAGNOSIS — I10 ESSENTIAL HYPERTENSION: ICD-10-CM

## 2019-10-07 PROBLEM — E11.3591 PROLIFERATIVE DIABETIC RETINOPATHY, RIGHT EYE (HCC): Status: ACTIVE | Noted: 2019-10-07

## 2019-10-07 LAB — HBA1C MFR BLD: 8 %

## 2019-10-07 PROCEDURE — G8417 CALC BMI ABV UP PARAM F/U: HCPCS | Performed by: FAMILY MEDICINE

## 2019-10-07 PROCEDURE — G8484 FLU IMMUNIZE NO ADMIN: HCPCS | Performed by: FAMILY MEDICINE

## 2019-10-07 PROCEDURE — 99213 OFFICE O/P EST LOW 20 MIN: CPT | Performed by: FAMILY MEDICINE

## 2019-10-07 PROCEDURE — 1090F PRES/ABSN URINE INCON ASSESS: CPT | Performed by: FAMILY MEDICINE

## 2019-10-07 PROCEDURE — 2022F DILAT RTA XM EVC RTNOPTHY: CPT | Performed by: FAMILY MEDICINE

## 2019-10-07 PROCEDURE — G8427 DOCREV CUR MEDS BY ELIG CLIN: HCPCS | Performed by: FAMILY MEDICINE

## 2019-10-07 PROCEDURE — 83036 HEMOGLOBIN GLYCOSYLATED A1C: CPT | Performed by: FAMILY MEDICINE

## 2019-10-08 RX ORDER — LISINOPRIL 5 MG/1
5 TABLET ORAL DAILY
Qty: 90 TABLET | Refills: 1 | Status: SHIPPED | OUTPATIENT
Start: 2019-10-08 | End: 2019-12-19 | Stop reason: SINTOL

## 2019-10-16 ENCOUNTER — HOSPITAL ENCOUNTER (OUTPATIENT)
Dept: PHYSICAL THERAPY | Age: 69
Setting detail: THERAPIES SERIES
Discharge: HOME | End: 2019-10-16
Payer: MEDICARE

## 2019-10-16 PROCEDURE — 97161 PT EVAL LOW COMPLEX 20 MIN: CPT

## 2019-10-16 PROCEDURE — 97530 THERAPEUTIC ACTIVITIES: CPT

## 2019-11-06 ENCOUNTER — HOSPITAL ENCOUNTER (OUTPATIENT)
Dept: PHYSICAL THERAPY | Age: 69
Setting detail: THERAPIES SERIES
Discharge: HOME | End: 2019-11-06
Payer: MEDICARE

## 2019-11-06 PROCEDURE — 97140 MANUAL THERAPY 1/> REGIONS: CPT

## 2019-11-06 PROCEDURE — 97530 THERAPEUTIC ACTIVITIES: CPT

## 2019-11-08 ENCOUNTER — HOSPITAL ENCOUNTER (OUTPATIENT)
Dept: PHYSICAL THERAPY | Age: 69
Setting detail: THERAPIES SERIES
Discharge: HOME | End: 2019-11-08
Payer: MEDICARE

## 2019-11-08 PROCEDURE — 97140 MANUAL THERAPY 1/> REGIONS: CPT

## 2019-11-08 PROCEDURE — 97016 VASOPNEUMATIC DEVICE THERAPY: CPT

## 2019-11-08 PROCEDURE — 97530 THERAPEUTIC ACTIVITIES: CPT

## 2019-11-11 ENCOUNTER — HOSPITAL ENCOUNTER (OUTPATIENT)
Dept: PHYSICAL THERAPY | Age: 69
Setting detail: THERAPIES SERIES
Discharge: HOME | End: 2019-11-11
Payer: MEDICARE

## 2019-11-11 PROCEDURE — 97140 MANUAL THERAPY 1/> REGIONS: CPT

## 2019-11-13 ENCOUNTER — HOSPITAL ENCOUNTER (OUTPATIENT)
Dept: PHYSICAL THERAPY | Age: 69
Setting detail: THERAPIES SERIES
Discharge: HOME | End: 2019-11-13
Payer: MEDICARE

## 2019-11-13 PROCEDURE — 97530 THERAPEUTIC ACTIVITIES: CPT

## 2019-11-13 PROCEDURE — 97140 MANUAL THERAPY 1/> REGIONS: CPT

## 2019-11-13 PROCEDURE — 97016 VASOPNEUMATIC DEVICE THERAPY: CPT

## 2019-11-22 ENCOUNTER — OFFICE VISIT (OUTPATIENT)
Dept: INTERNAL MEDICINE CLINIC | Age: 69
End: 2019-11-22
Payer: MEDICARE

## 2019-11-22 VITALS
HEART RATE: 78 BPM | OXYGEN SATURATION: 97 % | RESPIRATION RATE: 20 BRPM | SYSTOLIC BLOOD PRESSURE: 138 MMHG | TEMPERATURE: 98.1 F | DIASTOLIC BLOOD PRESSURE: 64 MMHG

## 2019-11-22 DIAGNOSIS — I10 ESSENTIAL HYPERTENSION: ICD-10-CM

## 2019-11-22 DIAGNOSIS — E11.9 TYPE 2 DIABETES MELLITUS WITHOUT COMPLICATION, WITH LONG-TERM CURRENT USE OF INSULIN (HCC): ICD-10-CM

## 2019-11-22 DIAGNOSIS — J20.9 ACUTE BRONCHITIS DUE TO INFECTION: Primary | ICD-10-CM

## 2019-11-22 DIAGNOSIS — Z79.4 TYPE 2 DIABETES MELLITUS WITHOUT COMPLICATION, WITH LONG-TERM CURRENT USE OF INSULIN (HCC): ICD-10-CM

## 2019-11-22 PROCEDURE — 1090F PRES/ABSN URINE INCON ASSESS: CPT | Performed by: FAMILY MEDICINE

## 2019-11-22 PROCEDURE — 99214 OFFICE O/P EST MOD 30 MIN: CPT | Performed by: FAMILY MEDICINE

## 2019-11-22 PROCEDURE — 1123F ACP DISCUSS/DSCN MKR DOCD: CPT | Performed by: FAMILY MEDICINE

## 2019-11-22 PROCEDURE — G8399 PT W/DXA RESULTS DOCUMENT: HCPCS | Performed by: FAMILY MEDICINE

## 2019-11-22 PROCEDURE — G8427 DOCREV CUR MEDS BY ELIG CLIN: HCPCS | Performed by: FAMILY MEDICINE

## 2019-11-22 PROCEDURE — 1036F TOBACCO NON-USER: CPT | Performed by: FAMILY MEDICINE

## 2019-11-22 PROCEDURE — G8417 CALC BMI ABV UP PARAM F/U: HCPCS | Performed by: FAMILY MEDICINE

## 2019-11-22 PROCEDURE — 3045F PR MOST RECENT HEMOGLOBIN A1C LEVEL 7.0-9.0%: CPT | Performed by: FAMILY MEDICINE

## 2019-11-22 PROCEDURE — 4040F PNEUMOC VAC/ADMIN/RCVD: CPT | Performed by: FAMILY MEDICINE

## 2019-11-22 PROCEDURE — 2022F DILAT RTA XM EVC RTNOPTHY: CPT | Performed by: FAMILY MEDICINE

## 2019-11-22 PROCEDURE — G8484 FLU IMMUNIZE NO ADMIN: HCPCS | Performed by: FAMILY MEDICINE

## 2019-11-22 PROCEDURE — 3017F COLORECTAL CA SCREEN DOC REV: CPT | Performed by: FAMILY MEDICINE

## 2019-11-22 RX ORDER — PROMETHAZINE HYDROCHLORIDE AND CODEINE PHOSPHATE 6.25; 1 MG/5ML; MG/5ML
5 SYRUP ORAL 4 TIMES DAILY PRN
Qty: 240 ML | Refills: 0 | Status: SHIPPED | OUTPATIENT
Start: 2019-11-22 | End: 2019-12-02

## 2019-11-22 RX ORDER — TELMISARTAN 40 MG/1
40 TABLET ORAL DAILY
Qty: 90 TABLET | Refills: 1 | Status: SHIPPED | OUTPATIENT
Start: 2019-11-22 | End: 2019-12-20 | Stop reason: SDUPTHER

## 2019-11-22 RX ORDER — ATORVASTATIN CALCIUM 20 MG/1
20 TABLET, FILM COATED ORAL DAILY
Qty: 90 TABLET | Refills: 1 | Status: SHIPPED | OUTPATIENT
Start: 2019-11-22 | End: 2019-12-19 | Stop reason: SDUPTHER

## 2019-11-22 RX ORDER — AMOXICILLIN 875 MG/1
875 TABLET, COATED ORAL 2 TIMES DAILY
Qty: 20 TABLET | Refills: 0 | Status: SHIPPED | OUTPATIENT
Start: 2019-11-22 | End: 2019-12-02

## 2019-11-22 ASSESSMENT — ENCOUNTER SYMPTOMS
SHORTNESS OF BREATH: 0
BLOOD IN STOOL: 0
CONSTIPATION: 0
ABDOMINAL PAIN: 0
DIARRHEA: 0

## 2019-11-26 DIAGNOSIS — E11.69 TYPE 2 DIABETES MELLITUS WITH OTHER SPECIFIED COMPLICATION, WITH LONG-TERM CURRENT USE OF INSULIN (HCC): ICD-10-CM

## 2019-11-26 DIAGNOSIS — Z79.4 TYPE 2 DIABETES MELLITUS WITH OTHER SPECIFIED COMPLICATION, WITH LONG-TERM CURRENT USE OF INSULIN (HCC): ICD-10-CM

## 2019-11-26 RX ORDER — BLOOD SUGAR DIAGNOSTIC
STRIP MISCELLANEOUS
Qty: 400 STRIP | Refills: 1 | Status: SHIPPED | OUTPATIENT
Start: 2019-11-26 | End: 2020-04-08

## 2019-12-19 ENCOUNTER — OFFICE VISIT (OUTPATIENT)
Dept: INTERNAL MEDICINE CLINIC | Age: 69
End: 2019-12-19
Payer: MEDICARE

## 2019-12-19 ENCOUNTER — TELEPHONE (OUTPATIENT)
Dept: INTERNAL MEDICINE CLINIC | Age: 69
End: 2019-12-19

## 2019-12-19 VITALS
WEIGHT: 293 LBS | BODY MASS INDEX: 49.84 KG/M2 | HEART RATE: 65 BPM | RESPIRATION RATE: 20 BRPM | OXYGEN SATURATION: 99 % | SYSTOLIC BLOOD PRESSURE: 160 MMHG | DIASTOLIC BLOOD PRESSURE: 73 MMHG

## 2019-12-19 DIAGNOSIS — I10 ESSENTIAL HYPERTENSION: ICD-10-CM

## 2019-12-19 DIAGNOSIS — E11.9 TYPE 2 DIABETES MELLITUS WITHOUT COMPLICATION, WITH LONG-TERM CURRENT USE OF INSULIN (HCC): ICD-10-CM

## 2019-12-19 DIAGNOSIS — E78.5 HYPERLIPIDEMIA LDL GOAL <100: ICD-10-CM

## 2019-12-19 DIAGNOSIS — Z79.4 TYPE 2 DIABETES MELLITUS WITHOUT COMPLICATION, WITH LONG-TERM CURRENT USE OF INSULIN (HCC): Primary | ICD-10-CM

## 2019-12-19 DIAGNOSIS — Z01.818 PREOP EXAMINATION: Primary | ICD-10-CM

## 2019-12-19 DIAGNOSIS — Z13.29 THYROID DISORDER SCREEN: ICD-10-CM

## 2019-12-19 DIAGNOSIS — E08.3591: ICD-10-CM

## 2019-12-19 DIAGNOSIS — Z79.4 TYPE 2 DIABETES MELLITUS WITHOUT COMPLICATION, WITH LONG-TERM CURRENT USE OF INSULIN (HCC): ICD-10-CM

## 2019-12-19 DIAGNOSIS — E11.9 TYPE 2 DIABETES MELLITUS WITHOUT COMPLICATION, WITH LONG-TERM CURRENT USE OF INSULIN (HCC): Primary | ICD-10-CM

## 2019-12-19 DIAGNOSIS — Z13.220 LIPID SCREENING: ICD-10-CM

## 2019-12-19 LAB
A/G RATIO: 1.2 (ref 1.1–2.2)
ALBUMIN SERPL-MCNC: 3.6 G/DL (ref 3.4–5)
ALP BLD-CCNC: 59 U/L (ref 40–129)
ALT SERPL-CCNC: 13 U/L (ref 10–40)
ANION GAP SERPL CALCULATED.3IONS-SCNC: 12 MMOL/L (ref 3–16)
AST SERPL-CCNC: 17 U/L (ref 15–37)
BASOPHILS ABSOLUTE: 0.1 K/UL (ref 0–0.2)
BASOPHILS RELATIVE PERCENT: 1.3 %
BILIRUB SERPL-MCNC: 0.5 MG/DL (ref 0–1)
BUN BLDV-MCNC: 21 MG/DL (ref 7–20)
CALCIUM SERPL-MCNC: 9.2 MG/DL (ref 8.3–10.6)
CHLORIDE BLD-SCNC: 99 MMOL/L (ref 99–110)
CHOLESTEROL, FASTING: 134 MG/DL (ref 0–199)
CO2: 27 MMOL/L (ref 21–32)
CREAT SERPL-MCNC: 0.9 MG/DL (ref 0.6–1.2)
EOSINOPHILS ABSOLUTE: 0.2 K/UL (ref 0–0.6)
EOSINOPHILS RELATIVE PERCENT: 6 %
GFR AFRICAN AMERICAN: >60
GFR NON-AFRICAN AMERICAN: >60
GLOBULIN: 2.9 G/DL
GLUCOSE FASTING: 111 MG/DL (ref 70–99)
HBA1C MFR BLD: 8.1 %
HCT VFR BLD CALC: 37.1 % (ref 36–48)
HDLC SERPL-MCNC: 66 MG/DL (ref 40–60)
HEMOGLOBIN: 11.9 G/DL (ref 12–16)
LDL CHOLESTEROL CALCULATED: 55 MG/DL
LYMPHOCYTES ABSOLUTE: 1.1 K/UL (ref 1–5.1)
LYMPHOCYTES RELATIVE PERCENT: 27.8 %
MCH RBC QN AUTO: 28.9 PG (ref 26–34)
MCHC RBC AUTO-ENTMCNC: 32.1 G/DL (ref 31–36)
MCV RBC AUTO: 90 FL (ref 80–100)
MONOCYTES ABSOLUTE: 0.5 K/UL (ref 0–1.3)
MONOCYTES RELATIVE PERCENT: 12.3 %
NEUTROPHILS ABSOLUTE: 2.2 K/UL (ref 1.7–7.7)
NEUTROPHILS RELATIVE PERCENT: 52.6 %
PDW BLD-RTO: 14.9 % (ref 12.4–15.4)
PLATELET # BLD: 209 K/UL (ref 135–450)
PMV BLD AUTO: 9.8 FL (ref 5–10.5)
POTASSIUM SERPL-SCNC: 4.2 MMOL/L (ref 3.5–5.1)
RBC # BLD: 4.12 M/UL (ref 4–5.2)
SODIUM BLD-SCNC: 138 MMOL/L (ref 136–145)
T4 FREE: 1 NG/DL (ref 0.9–1.8)
TOTAL PROTEIN: 6.5 G/DL (ref 6.4–8.2)
TRIGLYCERIDE, FASTING: 64 MG/DL (ref 0–150)
TSH SERPL DL<=0.05 MIU/L-ACNC: 2.5 UIU/ML (ref 0.27–4.2)
VLDLC SERPL CALC-MCNC: 13 MG/DL
WBC # BLD: 4.1 K/UL (ref 4–11)

## 2019-12-19 PROCEDURE — G8484 FLU IMMUNIZE NO ADMIN: HCPCS | Performed by: FAMILY MEDICINE

## 2019-12-19 PROCEDURE — 99213 OFFICE O/P EST LOW 20 MIN: CPT | Performed by: FAMILY MEDICINE

## 2019-12-19 PROCEDURE — G8427 DOCREV CUR MEDS BY ELIG CLIN: HCPCS | Performed by: FAMILY MEDICINE

## 2019-12-19 PROCEDURE — 2022F DILAT RTA XM EVC RTNOPTHY: CPT | Performed by: FAMILY MEDICINE

## 2019-12-19 PROCEDURE — G8417 CALC BMI ABV UP PARAM F/U: HCPCS | Performed by: FAMILY MEDICINE

## 2019-12-19 PROCEDURE — 1090F PRES/ABSN URINE INCON ASSESS: CPT | Performed by: FAMILY MEDICINE

## 2019-12-19 RX ORDER — OFLOXACIN 3 MG/ML
SOLUTION/ DROPS OPHTHALMIC
Refills: 2 | COMMUNITY
Start: 2019-10-14 | End: 2020-07-27

## 2019-12-19 RX ORDER — PREDNISOLONE ACETATE 10 MG/ML
SUSPENSION/ DROPS OPHTHALMIC
Refills: 2 | COMMUNITY
Start: 2019-10-14 | End: 2020-07-27

## 2019-12-19 RX ORDER — ATORVASTATIN CALCIUM 20 MG/1
20 TABLET, FILM COATED ORAL DAILY
Qty: 90 TABLET | Refills: 1 | Status: SHIPPED | OUTPATIENT
Start: 2019-12-19 | End: 2020-09-03 | Stop reason: ALTCHOICE

## 2019-12-19 SDOH — ECONOMIC STABILITY: TRANSPORTATION INSECURITY
IN THE PAST 12 MONTHS, HAS LACK OF TRANSPORTATION KEPT YOU FROM MEETINGS, WORK, OR FROM GETTING THINGS NEEDED FOR DAILY LIVING?: NO

## 2019-12-19 SDOH — ECONOMIC STABILITY: FOOD INSECURITY: WITHIN THE PAST 12 MONTHS, YOU WORRIED THAT YOUR FOOD WOULD RUN OUT BEFORE YOU GOT MONEY TO BUY MORE.: NEVER TRUE

## 2019-12-19 SDOH — ECONOMIC STABILITY: TRANSPORTATION INSECURITY
IN THE PAST 12 MONTHS, HAS THE LACK OF TRANSPORTATION KEPT YOU FROM MEDICAL APPOINTMENTS OR FROM GETTING MEDICATIONS?: NO

## 2019-12-19 SDOH — ECONOMIC STABILITY: INCOME INSECURITY: HOW HARD IS IT FOR YOU TO PAY FOR THE VERY BASICS LIKE FOOD, HOUSING, MEDICAL CARE, AND HEATING?: SOMEWHAT HARD

## 2019-12-19 SDOH — ECONOMIC STABILITY: FOOD INSECURITY: WITHIN THE PAST 12 MONTHS, THE FOOD YOU BOUGHT JUST DIDN'T LAST AND YOU DIDN'T HAVE MONEY TO GET MORE.: NEVER TRUE

## 2019-12-20 RX ORDER — TELMISARTAN 40 MG/1
40 TABLET ORAL DAILY
Qty: 90 TABLET | Refills: 1 | Status: SHIPPED | OUTPATIENT
Start: 2019-12-20 | End: 2020-07-13 | Stop reason: SDUPTHER

## 2019-12-30 ENCOUNTER — TELEPHONE (OUTPATIENT)
Dept: ORTHOPEDIC SURGERY | Age: 69
End: 2019-12-30

## 2020-01-10 RX ORDER — TRIAMTERENE AND HYDROCHLOROTHIAZIDE 37.5; 25 MG/1; MG/1
TABLET ORAL
Qty: 90 TABLET | Refills: 1 | Status: SHIPPED | OUTPATIENT
Start: 2020-01-10 | End: 2020-07-13 | Stop reason: SDUPTHER

## 2020-01-14 ENCOUNTER — TELEPHONE (OUTPATIENT)
Dept: INTERNAL MEDICINE CLINIC | Age: 70
End: 2020-01-14

## 2020-01-14 NOTE — TELEPHONE ENCOUNTER
She is on nolin n and r not covered by insurance , but humulin is . Please change to humulin. She is on N 40 units morning, and 2 units at night. She is on R 10-20-15.     Angelina Lynch

## 2020-02-24 RX ORDER — BLOOD SUGAR DIAGNOSTIC
STRIP MISCELLANEOUS
Qty: 450 EACH | Refills: 1 | Status: SHIPPED | OUTPATIENT
Start: 2020-02-24

## 2020-04-07 RX ORDER — BLOOD SUGAR DIAGNOSTIC
1 STRIP MISCELLANEOUS 4 TIMES DAILY
Qty: 400 EACH | Refills: 1 | Status: SHIPPED | OUTPATIENT
Start: 2020-04-07 | End: 2020-04-08 | Stop reason: SDUPTHER

## 2020-04-08 RX ORDER — BLOOD SUGAR DIAGNOSTIC
1 STRIP MISCELLANEOUS 4 TIMES DAILY
Qty: 400 EACH | Refills: 1 | Status: SHIPPED | OUTPATIENT
Start: 2020-04-08 | End: 2020-09-22

## 2020-04-09 RX ORDER — POTASSIUM CHLORIDE 750 MG/1
TABLET, FILM COATED, EXTENDED RELEASE ORAL
Qty: 90 TABLET | Refills: 1 | Status: SHIPPED | OUTPATIENT
Start: 2020-04-09 | End: 2020-09-29

## 2020-05-14 ENCOUNTER — TELEPHONE (OUTPATIENT)
Dept: INTERNAL MEDICINE CLINIC | Age: 70
End: 2020-05-14

## 2020-05-15 ENCOUNTER — VIRTUAL VISIT (OUTPATIENT)
Dept: INTERNAL MEDICINE CLINIC | Age: 70
End: 2020-05-15
Payer: MEDICARE

## 2020-05-15 PROCEDURE — 99442 PR PHYS/QHP TELEPHONE EVALUATION 11-20 MIN: CPT | Performed by: FAMILY MEDICINE

## 2020-05-15 RX ORDER — CIPROFLOXACIN 500 MG/1
500 TABLET, FILM COATED ORAL 2 TIMES DAILY
Qty: 14 TABLET | Refills: 0 | Status: SHIPPED | OUTPATIENT
Start: 2020-05-15 | End: 2020-05-22

## 2020-05-15 NOTE — PROGRESS NOTES
5/15/2020    TELEHEALTH EVALUATION -- Audio/phone (During DUONF-46 public health emergency)    HPI:    Akosua Baez (:  1950) has requested an audio/phone  evaluation for the following concern(s):    Patient complaining of possible UTI. She complain of pressure when urinating with increased to frequency. She denies burning. Denies fever and chills. She has underlying overactive bladder. She has diabetes and complain of hypoglycemia usually at night at the middle of the night blood sugar dropped to 50 or 60. She reported taking:Humulin N at 45 units at breakfast and and are at 15 units at breakfast and 20 units at lunch. She does not take her Humulin are at supper due to multiple episode of hypoglycemia. She has hypertension controlled with Micardis and Maxide. Review of Systems denies fever, cough or shortness of breath. Denies chest pain or palpitation. Denies bowel disturbance. Denies aches and pains more than usual.    Prior to Visit Medications    Medication Sig Taking? Authorizing Provider   ciprofloxacin (CIPRO) 500 MG tablet Take 1 tablet by mouth 2 times daily for 7 days Yes Valery Posadas MD   potassium chloride (KLOR-CON) 10 MEQ extended release tablet TAKE 1 TABLET BY MOUTH EVERY DAY  Valery Posadas MD   blood glucose test strips (ACCU-CHEK GUIDE) strip 1 each by In Vitro route 4 times daily  Valery Posadas MD   metFORMIN (GLUCOPHAGE) 1000 MG tablet Take 1 tablet by mouth 2 times daily (with meals)  Valery Posadas MD   Insulin Syringe-Needle U-100 31G X 516\" 1 ML MISC Use with both BID Humulin N and TID Humulin R injections = 5 times daily. Valery Posadas MD   insulin NPH (HUMULIN N) 100 UNIT/ML injection vial Sig: Inject 38 units at breakfast and 10 units at supper  Valery Posadas MD   insulin regular (HUMULIN R) 100 UNIT/ML injection Inject 10 units at breakfast; 15 units at lunch; 20 units at dinner.   Valery Posadas MD   triamterene-hydrochlorothiazide dose at lunchtime from 20 to 15 units. Patient does not take mealtime insulin at night because of multiple episodes of hypoglycemia. Continue metformin 1000 mg twice daily    3. Essential hypertension  Controlled. Continue Micardis 40 mg daily and Maxide once a day    4. OAB (overactive bladder)  Stable. Advised undergarment or diaper      RTC in 2-3 mos    Muna Prather is a 81 y.o. female being evaluated by a Virtual Visit (video visit) encounter to address concerns as mentioned above. A caregiver was present when appropriate. Due to this being a TeleHealth encounter (During QYTDF-35 public health emergency), evaluation of the following organ systems was limited: Vitals/Constitutional/EENT/Resp/CV/GI//MS/Neuro/Skin/Heme-Lymph-Imm. Pursuant to the emergency declaration under the [address] [place] [address], [address] [place] [address] authority and the [employer] and [employer] Act, this Virtual Visit was conducted with patient's (and/or legal guardian's) consent, to reduce the patient's risk of exposure to COVID-19 and provide necessary medical care. The patient (and/or legal guardian) has also been advised to contact this office for worsening conditions or problems, and seek emergency medical treatment and/or call 911 if deemed necessary. Patient identification was verified at the start of the visit: Yes    Total time spent on this encounter: 15 minutes    Services were provided through a video synchronous discussion virtually to substitute for in-person clinic visit. Patient and provider were located at their individual homes. --Noelle Stephenson MD on 5/15/2020 at 4:14 PM    An electronic signature was used to authenticate this note.

## 2020-05-27 ENCOUNTER — TELEPHONE (OUTPATIENT)
Dept: INTERNAL MEDICINE CLINIC | Age: 70
End: 2020-05-27

## 2020-05-27 RX ORDER — SULFAMETHOXAZOLE AND TRIMETHOPRIM 800; 160 MG/1; MG/1
1 TABLET ORAL 2 TIMES DAILY
Qty: 14 TABLET | Refills: 0 | Status: SHIPPED | OUTPATIENT
Start: 2020-05-27 | End: 2020-06-03

## 2020-07-13 ENCOUNTER — OFFICE VISIT (OUTPATIENT)
Dept: INTERNAL MEDICINE CLINIC | Age: 70
End: 2020-07-13
Payer: MEDICARE

## 2020-07-13 VITALS
RESPIRATION RATE: 18 BRPM | DIASTOLIC BLOOD PRESSURE: 62 MMHG | BODY MASS INDEX: 48.82 KG/M2 | HEIGHT: 65 IN | OXYGEN SATURATION: 96 % | WEIGHT: 293 LBS | SYSTOLIC BLOOD PRESSURE: 149 MMHG | TEMPERATURE: 98.2 F | HEART RATE: 80 BPM

## 2020-07-13 PROBLEM — I87.2 VENOUS INSUFFICIENCY OF BOTH LOWER EXTREMITIES: Status: ACTIVE | Noted: 2020-07-13

## 2020-07-13 PROBLEM — L03.116 CELLULITIS OF LEFT LEG: Status: ACTIVE | Noted: 2020-07-13

## 2020-07-13 PROBLEM — M17.12 PRIMARY OSTEOARTHRITIS OF LEFT KNEE: Status: ACTIVE | Noted: 2020-07-13

## 2020-07-13 LAB
CREATININE URINE POCT: 8.21
HBA1C MFR BLD: 7.8 %
MICROALBUMIN/CREAT 24H UR: <5 MG/G{CREAT}
MICROALBUMIN/CREAT UR-RTO: <6.1

## 2020-07-13 PROCEDURE — 3046F HEMOGLOBIN A1C LEVEL >9.0%: CPT | Performed by: FAMILY MEDICINE

## 2020-07-13 PROCEDURE — 99214 OFFICE O/P EST MOD 30 MIN: CPT | Performed by: FAMILY MEDICINE

## 2020-07-13 PROCEDURE — G8417 CALC BMI ABV UP PARAM F/U: HCPCS | Performed by: FAMILY MEDICINE

## 2020-07-13 PROCEDURE — 1036F TOBACCO NON-USER: CPT | Performed by: FAMILY MEDICINE

## 2020-07-13 PROCEDURE — 2022F DILAT RTA XM EVC RTNOPTHY: CPT | Performed by: FAMILY MEDICINE

## 2020-07-13 PROCEDURE — 83036 HEMOGLOBIN GLYCOSYLATED A1C: CPT | Performed by: FAMILY MEDICINE

## 2020-07-13 PROCEDURE — 82044 UR ALBUMIN SEMIQUANTITATIVE: CPT | Performed by: FAMILY MEDICINE

## 2020-07-13 PROCEDURE — 3017F COLORECTAL CA SCREEN DOC REV: CPT | Performed by: FAMILY MEDICINE

## 2020-07-13 PROCEDURE — 4040F PNEUMOC VAC/ADMIN/RCVD: CPT | Performed by: FAMILY MEDICINE

## 2020-07-13 PROCEDURE — 1090F PRES/ABSN URINE INCON ASSESS: CPT | Performed by: FAMILY MEDICINE

## 2020-07-13 PROCEDURE — 1123F ACP DISCUSS/DSCN MKR DOCD: CPT | Performed by: FAMILY MEDICINE

## 2020-07-13 PROCEDURE — G8427 DOCREV CUR MEDS BY ELIG CLIN: HCPCS | Performed by: FAMILY MEDICINE

## 2020-07-13 PROCEDURE — G8399 PT W/DXA RESULTS DOCUMENT: HCPCS | Performed by: FAMILY MEDICINE

## 2020-07-13 RX ORDER — TELMISARTAN 80 MG/1
80 TABLET ORAL DAILY
Qty: 90 TABLET | Refills: 1 | Status: SHIPPED | OUTPATIENT
Start: 2020-07-13 | End: 2021-01-11 | Stop reason: SDUPTHER

## 2020-07-13 RX ORDER — FUROSEMIDE 40 MG/1
40 TABLET ORAL EVERY MORNING
Qty: 30 TABLET | Refills: 0 | Status: SHIPPED | OUTPATIENT
Start: 2020-07-13 | End: 2020-09-03 | Stop reason: ALTCHOICE

## 2020-07-13 RX ORDER — TRIAMTERENE AND HYDROCHLOROTHIAZIDE 37.5; 25 MG/1; MG/1
1 TABLET ORAL DAILY
Qty: 90 TABLET | Refills: 1 | Status: SHIPPED | OUTPATIENT
Start: 2020-07-13 | End: 2021-01-11

## 2020-07-13 RX ORDER — SULFAMETHOXAZOLE AND TRIMETHOPRIM 800; 160 MG/1; MG/1
1 TABLET ORAL 2 TIMES DAILY
Qty: 20 TABLET | Refills: 0 | Status: SHIPPED | OUTPATIENT
Start: 2020-07-13 | End: 2020-07-23

## 2020-07-13 RX ORDER — POTASSIUM CHLORIDE 750 MG/1
10 TABLET, EXTENDED RELEASE ORAL DAILY
Qty: 30 TABLET | Refills: 0 | Status: SHIPPED | OUTPATIENT
Start: 2020-07-13 | End: 2020-09-29 | Stop reason: SDUPTHER

## 2020-07-13 RX ORDER — CEPHALEXIN 500 MG/1
500 CAPSULE ORAL 3 TIMES DAILY
Qty: 21 CAPSULE | Refills: 1 | Status: SHIPPED | OUTPATIENT
Start: 2020-07-13 | End: 2020-07-20

## 2020-07-13 ASSESSMENT — ENCOUNTER SYMPTOMS
DIARRHEA: 0
BLOOD IN STOOL: 0
ABDOMINAL PAIN: 0
COLOR CHANGE: 1
SHORTNESS OF BREATH: 0
CONSTIPATION: 0

## 2020-07-13 ASSESSMENT — PATIENT HEALTH QUESTIONNAIRE - PHQ9
SUM OF ALL RESPONSES TO PHQ QUESTIONS 1-9: 0
SUM OF ALL RESPONSES TO PHQ QUESTIONS 1-9: 0
SUM OF ALL RESPONSES TO PHQ9 QUESTIONS 1 & 2: 0
2. FEELING DOWN, DEPRESSED OR HOPELESS: 0
1. LITTLE INTEREST OR PLEASURE IN DOING THINGS: 0

## 2020-07-13 NOTE — PROGRESS NOTES
(KEFLEX) 500 MG capsule Take 1 capsule by mouth 3 times daily for 7 days Yes Kyler Kern MD   furosemide (LASIX) 40 MG tablet Take 1 tablet by mouth every morning Yes Kyler Kern MD   potassium chloride (KLOR-CON M) 10 MEQ extended release tablet Take 1 tablet by mouth daily Yes Kyler Kern MD   telmisartan (MICARDIS) 80 MG tablet Take 1 tablet by mouth daily Yes Kyler Kern MD   insulin regular (HUMULIN R) 100 UNIT/ML injection Inject 20 units at breakfast; 10 units at lunch; 20 units at dinner. Yes Kyler Kern MD   potassium chloride (KLOR-CON) 10 MEQ extended release tablet TAKE 1 TABLET BY MOUTH EVERY DAY  Kyler Kern MD   blood glucose test strips (ACCU-CHEK GUIDE) strip 1 each by In Vitro route 4 times daily  Kyler Kern MD   metFORMIN (GLUCOPHAGE) 1000 MG tablet Take 1 tablet by mouth 2 times daily (with meals)  Kyler Kern MD   Insulin Syringe-Needle U-100 31G X 5/16\" 1 ML MISC Use with both BID Humulin N and TID Humulin R injections = 5 times daily.   Kyler Kern MD   insulin NPH (HUMULIN N) 100 UNIT/ML injection vial Sig: Inject 38 units at breakfast and 10 units at supper  Kyler Kern MD   ofloxacin (OCUFLOX) 0.3 % solution INSTILL 1 DROP INTO RIGHT EYE 4 TIMES A DAY  Historical Provider, MD   prednisoLONE acetate (PRED FORTE) 1 % ophthalmic suspension INSTILL 1 DROP INTO RIGHT EYE 4 TIMES A DAY  Historical Provider, MD   atorvastatin (LIPITOR) 20 MG tablet Take 1 tablet by mouth daily  Patient not taking: Reported on 2020  Kyler Kern MD        Social History     Tobacco Use    Smoking status: Former Smoker     Packs/day: 0.50     Years: 20.00     Pack years: 10.00     Types: Cigarettes     Last attempt to quit: 3/1/1996     Years since quittin.3    Smokeless tobacco: Never Used   Substance Use Topics    Alcohol use: No        Vitals:    20 1137   BP: (!) 149/62   Pulse: 80   Resp: 18   Temp: 98.2 °F (36.8 °C)   TempSrc: Temporal SpO2: 96%   Weight: (!) 313 lb (142 kg)   Height: 5' 4.5\" (1.638 m)     Estimated body mass index is 52.9 kg/m² as calculated from the following:    Height as of this encounter: 5' 4.5\" (1.638 m). Weight as of this encounter: 313 lb (142 kg). Physical Exam  Constitutional:       General: She is not in acute distress. Appearance: She is well-developed. HENT:      Head: Normocephalic. Eyes:      Conjunctiva/sclera: Conjunctivae normal.   Neck:      Musculoskeletal: Neck supple. Thyroid: No thyromegaly. Cardiovascular:      Rate and Rhythm: Normal rate and regular rhythm. Heart sounds: Normal heart sounds. No murmur. Pulmonary:      Effort: No respiratory distress. Breath sounds: Normal breath sounds. No wheezing or rales. Abdominal:      General: There is no distension. Palpations: Abdomen is soft. Musculoskeletal: Normal range of motion. Legs:    Skin:     General: Skin is warm. Findings: No rash. Neurological:      Mental Status: She is alert and oriented to person, place, and time. Psychiatric:         Behavior: Behavior normal.     Left foot: soft tissue swelling noted over the left ankle with erythema. Neurovascular exam abnormal -     ASSESSMENT/PLAN:  1. Cellulitis of left leg  Will order Bactrim DS twice daily for 10 days and Keflex 500 mg 3 times daily. May paint the affected area with Betadine solution. Patient referred to the podiatrist for further care    2. Venous insufficiency of both lower extremities  Will add Lasix 40 mg daily plus KCl 10 mEq daily. 3. Essential hypertension   blood pressure somewhat elevated today. Will increase Micardis from 40 mg to 80 mg daily. Continue Maxide  - triamterene-hydroCHLOROthiazide (MAXZIDE-25) 37.5-25 MG per tablet; Take 1 tablet by mouth daily  Dispense: 90 tablet; Refill: 1    4. Type 2 diabetes mellitus without complication, with long-term current use of insulin ([address]  Fairly controlled.   Continue NPH insulin ( Humulin N) 45 units daily and Humulin R 20 units at breakfast 10 units at lunch and 20 units at supper Patient reported not eating too much at lunch  - POCT glycosylated hemoglobin (Hb A1C)  - POCT microalbumin  - Diabetic Foot Exam  - insulin regular (HUMULIN R) 100 UNIT/ML injection; Inject 20 units at breakfast; 10 units at lunch; 20 units at dinner. Dispense: 41 mL; Refill: 1    5. Primary osteoarthritis of left knee  May take Tylenol arthritis 3 times a day as needed for pain. 6. Morbid obesity due to excess calories ([address]  Encouraged to lose weight with diet and exercise.       RTC in 2 weeks and PRN    An electronic signature was used to authenticate this note.    --Lavern Corley MD on 7/13/2020 at 12:16 PM

## 2020-07-21 ENCOUNTER — TELEPHONE (OUTPATIENT)
Dept: INTERNAL MEDICINE CLINIC | Age: 70
End: 2020-07-21

## 2020-07-21 NOTE — TELEPHONE ENCOUNTER
Hives from vaccines Tdap and shingrix she got on 07/13/2020. Itching so bad still right worse than left. She is using Cortizone cream w/o relief. Can you send an RX?

## 2020-07-21 NOTE — TELEPHONE ENCOUNTER
She can not take Benadryl makes her feel \"off\" for days. She has to cook for a wedding on Sat. Is there something else? A steriod?

## 2020-07-22 NOTE — TELEPHONE ENCOUNTER
Per Dr Alhaji Castillo:  Medrol Dose pack    Called pt. Left detailed VM of this. Called into [facility] VM.

## 2020-07-27 ENCOUNTER — OFFICE VISIT (OUTPATIENT)
Dept: INTERNAL MEDICINE CLINIC | Age: 70
End: 2020-07-27
Payer: MEDICARE

## 2020-07-27 VITALS
BODY MASS INDEX: 52.32 KG/M2 | HEART RATE: 92 BPM | RESPIRATION RATE: 18 BRPM | WEIGHT: 293 LBS | DIASTOLIC BLOOD PRESSURE: 69 MMHG | OXYGEN SATURATION: 99 % | SYSTOLIC BLOOD PRESSURE: 135 MMHG | TEMPERATURE: 97.2 F

## 2020-07-27 PROCEDURE — 1036F TOBACCO NON-USER: CPT | Performed by: FAMILY MEDICINE

## 2020-07-27 PROCEDURE — G8427 DOCREV CUR MEDS BY ELIG CLIN: HCPCS | Performed by: FAMILY MEDICINE

## 2020-07-27 PROCEDURE — 3051F HG A1C>EQUAL 7.0%<8.0%: CPT | Performed by: FAMILY MEDICINE

## 2020-07-27 PROCEDURE — 2022F DILAT RTA XM EVC RTNOPTHY: CPT | Performed by: FAMILY MEDICINE

## 2020-07-27 PROCEDURE — 4040F PNEUMOC VAC/ADMIN/RCVD: CPT | Performed by: FAMILY MEDICINE

## 2020-07-27 PROCEDURE — 1090F PRES/ABSN URINE INCON ASSESS: CPT | Performed by: FAMILY MEDICINE

## 2020-07-27 PROCEDURE — G8417 CALC BMI ABV UP PARAM F/U: HCPCS | Performed by: FAMILY MEDICINE

## 2020-07-27 PROCEDURE — 99214 OFFICE O/P EST MOD 30 MIN: CPT | Performed by: FAMILY MEDICINE

## 2020-07-27 PROCEDURE — G8399 PT W/DXA RESULTS DOCUMENT: HCPCS | Performed by: FAMILY MEDICINE

## 2020-07-27 PROCEDURE — 1123F ACP DISCUSS/DSCN MKR DOCD: CPT | Performed by: FAMILY MEDICINE

## 2020-07-27 PROCEDURE — 3017F COLORECTAL CA SCREEN DOC REV: CPT | Performed by: FAMILY MEDICINE

## 2020-07-27 RX ORDER — PREDNISONE 20 MG/1
20 TABLET ORAL DAILY
Qty: 10 TABLET | Refills: 0 | Status: SHIPPED | OUTPATIENT
Start: 2020-07-27 | End: 2020-08-06

## 2020-07-27 ASSESSMENT — ENCOUNTER SYMPTOMS
DIARRHEA: 0
SHORTNESS OF BREATH: 0
BLOOD IN STOOL: 0
CONSTIPATION: 0
ABDOMINAL PAIN: 0

## 2020-07-27 NOTE — PROGRESS NOTES
M) 10 MEQ extended release tablet Take 1 tablet by mouth daily  Yoly Ashraf MD   telmisartan (MICARDIS) 80 MG tablet Take 1 tablet by mouth daily  Yoly Ashraf MD   insulin regular (HUMULIN R) 100 UNIT/ML injection Inject 20 units at breakfast; 10 units at lunch; 20 units at dinner. Yoly Ashraf MD   insulin NPH (HUMULIN N) 100 UNIT/ML injection vial Sig: Inject 45 units at breakfast  Yoly Ashraf MD   potassium chloride (KLOR-CON) 10 MEQ extended release tablet TAKE 1 TABLET BY MOUTH EVERY DAY  Yoly Ashraf MD   blood glucose test strips (ACCU-CHEK GUIDE) strip 1 each by In Vitro route 4 times daily  Yoly Ashraf MD   metFORMIN (GLUCOPHAGE) 1000 MG tablet Take 1 tablet by mouth 2 times daily (with meals)  Yoly Ashraf MD   Insulin Syringe-Needle U-100 31G X 5/16\" 1 ML MISC Use with both BID Humulin N and TID Humulin R injections = 5 times daily. Yoly Ashraf MD   atorvastatin (LIPITOR) 20 MG tablet Take 1 tablet by mouth daily  Patient not taking: Reported on 2020  Yoly Ashraf MD        Social History     Tobacco Use    Smoking status: Former Smoker     Packs/day: 0.50     Years: 20.00     Pack years: 10.00     Types: Cigarettes     Last attempt to quit: 3/1/1996     Years since quittin.4    Smokeless tobacco: Never Used   Substance Use Topics    Alcohol use: No        Vitals:    20 1031   BP: 135/69   Pulse: 92   Resp: 18   Temp: 97.2 °F (36.2 °C)   TempSrc: Temporal   SpO2: 99%   Weight: (!) 309 lb 9.6 oz (140.4 kg)     Estimated body mass index is 52.32 kg/m² as calculated from the following:    Height as of 20: 5' 4.5\" (1.638 m). Weight as of this encounter: 309 lb 9.6 oz (140.4 kg). Physical Exam  Constitutional:       Appearance: She is well-developed. HENT:      Head: Normocephalic.       Right Ear: External ear normal.      Nose: Nose normal.   Eyes:      Conjunctiva/sclera: Conjunctivae normal.      Pupils: Pupils are equal, round, and reactive to light. Neck:      Musculoskeletal: Normal range of motion and neck supple. Thyroid: No thyromegaly. Cardiovascular:      Rate and Rhythm: Normal rate and regular rhythm. Heart sounds: Normal heart sounds. No murmur. No friction rub. Pulmonary:      Effort: Pulmonary effort is normal.      Breath sounds: Normal breath sounds. Abdominal:      General: Bowel sounds are normal.      Palpations: Abdomen is soft. There is no mass. Musculoskeletal: Normal range of motion. Lymphadenopathy:      Cervical: No cervical adenopathy. Skin:     General: Skin is warm. Findings: Rash present. Rash is crusting, macular and scaling. Neurological:      Mental Status: She is alert and oriented to person, place, and time. ASSESSMENT/PLAN:  1. Cellulitis of left leg  Resolving. He finished antibiotics of Bactrim DS for 10 days. She has seen a podiatrist hold appears to be not concerned with a lesion. 2. Skin lesions, generalized  Has multiple macular lesions on both upper and lower extremities  The lesion has  flaky surface- not sure if due to eczema or psoriasis. Patient is concerned it might be allergic reaction to shingle since the lesion showed up after shingles vaccine injection. She tried Benadryl tablet without success. Will try  hydrocortisone 2.5% ointment applied to affected areas twice a day and prednisone 20 mg daily for 10 days. If not improved refer to  dermatologist for further evaluation and treatment  - Lobo Stack MD, Dermatology, [facility]    3. Venous insufficiency of both lower extremities/4. Stasis dermatitis of both legs  Continue Lasix 40 mg daily plus potassium 10 mEq daily. Continue compression stockings or leg sleeve. Keep legs elevated. Stay active to mobilize the fluid in the legs. 5. Essential hypertension  Controlled. Continue Maxide 25 mg daily and Micardis 80 mg daily    6.  Type 2 diabetes mellitus without complication, with long-term current use of insulin (HCC)  Controlled. Continue insulin NPH and Humulin R. Watch for fluctuation of blood sugar due to prednisone tablet    7. Morbid obesity due to excess calories ([address]  Encouraged to lose weight with diet and exercise.       RTC in 2 mos    An electronic signature was used to authenticate this note.    --Blanquita Tatum MD on 7/27/2020 at 11:00 AM

## 2020-08-10 ENCOUNTER — TELEPHONE (OUTPATIENT)
Dept: PHARMACY | Facility: CLINIC | Age: 70
End: 2020-08-10

## 2020-08-10 NOTE — TELEPHONE ENCOUNTER
CLINICAL PHARMACY: STATIN REVIEW    SUBJECTIVE:   Identified as DM care gap for [employer]: statin therapy. OBJECTIVE:  Allergies   Allergen Reactions    Cephalexin Itching     Can take Amoxil    Losartan Other (See Comments)     lightheaded       Medications per current medication list:  Current Outpatient Medications   Medication Sig Dispense Refill    triamterene-hydroCHLOROthiazide (MAXZIDE-25) 37.5-25 MG per tablet Take 1 tablet by mouth daily 90 tablet 1    furosemide (LASIX) 40 MG tablet Take 1 tablet by mouth every morning 30 tablet 0    potassium chloride (KLOR-CON M) 10 MEQ extended release tablet Take 1 tablet by mouth daily 30 tablet 0    telmisartan (MICARDIS) 80 MG tablet Take 1 tablet by mouth daily 90 tablet 1    insulin regular (HUMULIN R) 100 UNIT/ML injection Inject 20 units at breakfast; 10 units at lunch; 20 units at dinner. 41 mL 1    insulin NPH (HUMULIN N) 100 UNIT/ML injection vial Sig: Inject 45 units at breakfast 44 mL 1    potassium chloride (KLOR-CON) 10 MEQ extended release tablet TAKE 1 TABLET BY MOUTH EVERY DAY 90 tablet 1    blood glucose test strips (ACCU-CHEK GUIDE) strip 1 each by In Vitro route 4 times daily 400 each 1    metFORMIN (GLUCOPHAGE) 1000 MG tablet Take 1 tablet by mouth 2 times daily (with meals) 180 tablet 1    Insulin Syringe-Needle U-100 31G X 5/16\" 1 ML MISC Use with both BID Humulin N and TID Humulin R injections = 5 times daily. 450 each 1    atorvastatin (LIPITOR) 20 MG tablet Take 1 tablet by mouth daily (Patient not taking: Reported on 7/13/2020) 90 tablet 1     No current facility-administered medications for this visit.         Labs:  Lab Results   Component Value Date    CHOL 147 09/23/2017    CHOL 144 07/01/2016    CHOL 167 08/19/2014     Lab Results   Component Value Date    TRIG 72 09/23/2017    TRIG 133 07/01/2016    TRIG 83 08/19/2014     Lab Results   Component Value Date    HDL 66 (H) 12/19/2019    HDL 63 (H) 09/23/2017    HDL 64 (H) 07/01/2016     Lab Results   Component Value Date    LDLCALC 55 12/19/2019    LDLCALC 70 09/23/2017    LDLCALC 53 07/01/2016     Lab Results   Component Value Date    LABVLDL 13 12/19/2019    LABVLDL 14 09/23/2017    LABVLDL 27 07/01/2016     No results found for: [facility]  Lab Results   Component Value Date    ALT 13 12/19/2019        The 10-year ASCVD risk score (Manohar Walker et al., 2013) is: 18.9%    Values used to calculate the score:      Age: 78 years      Sex: Female      Is Non- : Yes      Diabetic: Yes      Tobacco smoker: No      Systolic Blood Pressure: 102 mmHg      Is BP treated: Yes      HDL Cholesterol: 66 mg/dL      Total Cholesterol: 134 mg/dL      ASSESSMENT:    2019 ADA Guidelines Age:       >/= 36years old:   o No history of ASCVD or 10-year ASCVD risk < 20% - moderate-intensity statin is recommended. o Prescribed Atorvastatin 20 mg- reported not taking on 7/13/20  o LDL is 55    PLAN:  Patient states she was getting joint pain after taking it for a couple weeks and since she really does not have high cholesterol she just stopped taking it. Joint pain did go away after stopping the medication. Explained ASCVD risk to patient and not just cholesterol numbers and risk of not taking a statin. Patient very polite and declines statin at this time. Explained we would try a different statin and not lipitor and could try co enzyme q 10- patient again declines at this time.      Thank you,    Armond Bullock, PharmD, [facility] Pharmacist  Direct: [phone], Ext [phone]  ====================================================  CLINICAL PHARMACY CONSULT: MED RECONCILIATION/REVIEW ADDENDUM    For Pharmacy Admin Tracking Only    PHSO: Yes  Total # of Interventions Recommended: 1  - New Order #: 1 New Medication Order Reason(s): Needs Additional Medication Therapy  Recommended intervention potential cost savings: 0  Total Interventions

## 2020-09-02 NOTE — PROGRESS NOTES
[facility]) Dermatology  Yomaira RondonDougangel      Julius Mcguire Cage  1950    78 y.o. female     Date of Visit: 9/3/2020    Chief Complaint / Reason for Referral: Lesion    I was asked to see this patient by Dr. Lam Perales    History of Present Illness:  1. Several week hx persistent severely pruritic eruption of trunk and extremities that initially developed 1 week after receiving pneumovax, zostavax and Tdap injections. Mild improvement of pruritus, but not clearance of eruption, w/ HC 2.5% crm and 10d prednisone taper.   -No new antecedent meds  -(+) personal hx of eczema, allergic rhinitis  -Showers every other day w/ body wash all over. Lotion only on shower days    Review of Systems:  Constitutional: Reports general sense of well-being. Heme: Denies abnormal bleeding/bruising. Past Medical History, Surgical History, Family History, Medications and Allergies reviewed. Past Medical History:   Diagnosis Date    Cataract     Diabetes     Diabetic retinopathy ([address]     High blood pressure     Proliferative diabetic retinopathy, right eye ([address]      Past Surgical History:   Procedure Laterality Date    CARPAL TUNNEL RELEASE  1999,2000    Bilateral    CATARACT REMOVAL WITH IMPLANT      MARY EYES    COLONOSCOPY      FINGER TRIGGER RELEASE      FINGER TRIGGER RELEASE      FINGER TRIGGER RELEASE Left 03/08/2018    left ring finger    HAND SURGERY  2002, 2008    Left x5    HYSTERECTOMY      SHOULDER SURGERY  2005    Right       Allergies   Allergen Reactions    Cephalexin Itching     Can take Amoxil    Losartan Other (See Comments)     lightheaded     Outpatient Medications Marked as Taking for the 9/3/20 encounter (Office Visit) with Yomaira Rondon MD   Medication Sig Dispense Refill    triamcinolone (KENALOG) 0.1 % ointment Apply sparingly to affected area(s) bid prn for flares, up to 2 weeks at a time. Do not apply on cleared skin.  454 g 0    triamterene-hydroCHLOROthiazide (MAXZIDE-25) was performed followed by placement of simple interrupted 4-0 nylon sutures. Specimen(s) sent to pathology. The wound(s) were dressed with petrolatum and covered with a bandage. Pt was educated re: risk of bleeding, infection, scar, wound care instructions and suture removal.  -Triamcinolone 0.1% oint bid prn.  Edu re: sparing use, atrophy, striae, hypopigmentation, telangiectasias.  -Dry skin care reviewed -- limit to daily showers, avoid hot water, mild soap (eg. Dove) to limited areas (axillae, groin), moisturize at least qd (thicker better)

## 2020-09-03 ENCOUNTER — OFFICE VISIT (OUTPATIENT)
Dept: DERMATOLOGY | Age: 70
End: 2020-09-03
Payer: MEDICARE

## 2020-09-03 VITALS — TEMPERATURE: 98.1 F

## 2020-09-03 PROCEDURE — 1123F ACP DISCUSS/DSCN MKR DOCD: CPT | Performed by: DERMATOLOGY

## 2020-09-03 PROCEDURE — 1036F TOBACCO NON-USER: CPT | Performed by: DERMATOLOGY

## 2020-09-03 PROCEDURE — G8427 DOCREV CUR MEDS BY ELIG CLIN: HCPCS | Performed by: DERMATOLOGY

## 2020-09-03 PROCEDURE — 11104 PUNCH BX SKIN SINGLE LESION: CPT | Performed by: DERMATOLOGY

## 2020-09-03 PROCEDURE — 1090F PRES/ABSN URINE INCON ASSESS: CPT | Performed by: DERMATOLOGY

## 2020-09-03 PROCEDURE — G8417 CALC BMI ABV UP PARAM F/U: HCPCS | Performed by: DERMATOLOGY

## 2020-09-03 PROCEDURE — 3017F COLORECTAL CA SCREEN DOC REV: CPT | Performed by: DERMATOLOGY

## 2020-09-03 PROCEDURE — G8399 PT W/DXA RESULTS DOCUMENT: HCPCS | Performed by: DERMATOLOGY

## 2020-09-03 PROCEDURE — 99203 OFFICE O/P NEW LOW 30 MIN: CPT | Performed by: DERMATOLOGY

## 2020-09-03 PROCEDURE — 4040F PNEUMOC VAC/ADMIN/RCVD: CPT | Performed by: DERMATOLOGY

## 2020-09-03 NOTE — Clinical Note
Dear Andrea Alfredo,    I had the pleasure of seeing your patient, Yanet Roberson, in my office today. Thanks so much for involving me in her care. Please see my note and call me if you have any questions.     Song Mason

## 2020-09-09 LAB — DERMATOLOGY PATHOLOGY REPORT: NORMAL

## 2020-09-22 RX ORDER — BLOOD SUGAR DIAGNOSTIC
STRIP MISCELLANEOUS
Qty: 400 EACH | Refills: 1 | Status: SHIPPED | OUTPATIENT
Start: 2020-09-22 | End: 2021-02-22 | Stop reason: SDUPTHER

## 2020-09-29 ENCOUNTER — OFFICE VISIT (OUTPATIENT)
Dept: INTERNAL MEDICINE CLINIC | Age: 70
End: 2020-09-29
Payer: MEDICARE

## 2020-09-29 VITALS
OXYGEN SATURATION: 99 % | HEART RATE: 80 BPM | RESPIRATION RATE: 18 BRPM | TEMPERATURE: 97.2 F | DIASTOLIC BLOOD PRESSURE: 70 MMHG | BODY MASS INDEX: 52.9 KG/M2 | WEIGHT: 293 LBS | SYSTOLIC BLOOD PRESSURE: 139 MMHG

## 2020-09-29 PROCEDURE — 3051F HG A1C>EQUAL 7.0%<8.0%: CPT | Performed by: FAMILY MEDICINE

## 2020-09-29 PROCEDURE — G8417 CALC BMI ABV UP PARAM F/U: HCPCS | Performed by: FAMILY MEDICINE

## 2020-09-29 PROCEDURE — G8399 PT W/DXA RESULTS DOCUMENT: HCPCS | Performed by: FAMILY MEDICINE

## 2020-09-29 PROCEDURE — G8427 DOCREV CUR MEDS BY ELIG CLIN: HCPCS | Performed by: FAMILY MEDICINE

## 2020-09-29 PROCEDURE — 2022F DILAT RTA XM EVC RTNOPTHY: CPT | Performed by: FAMILY MEDICINE

## 2020-09-29 PROCEDURE — 99214 OFFICE O/P EST MOD 30 MIN: CPT | Performed by: FAMILY MEDICINE

## 2020-09-29 PROCEDURE — 1090F PRES/ABSN URINE INCON ASSESS: CPT | Performed by: FAMILY MEDICINE

## 2020-09-29 PROCEDURE — 1123F ACP DISCUSS/DSCN MKR DOCD: CPT | Performed by: FAMILY MEDICINE

## 2020-09-29 PROCEDURE — 4040F PNEUMOC VAC/ADMIN/RCVD: CPT | Performed by: FAMILY MEDICINE

## 2020-09-29 PROCEDURE — 1036F TOBACCO NON-USER: CPT | Performed by: FAMILY MEDICINE

## 2020-09-29 PROCEDURE — 3017F COLORECTAL CA SCREEN DOC REV: CPT | Performed by: FAMILY MEDICINE

## 2020-09-29 RX ORDER — POTASSIUM CHLORIDE 750 MG/1
10 TABLET, EXTENDED RELEASE ORAL DAILY
Qty: 90 TABLET | Refills: 1 | Status: SHIPPED | OUTPATIENT
Start: 2020-09-29 | End: 2020-09-29

## 2020-09-29 ASSESSMENT — ENCOUNTER SYMPTOMS
SHORTNESS OF BREATH: 0
ABDOMINAL PAIN: 0
BLOOD IN STOOL: 0
CONSTIPATION: 0
DIARRHEA: 0

## 2020-09-29 NOTE — PROGRESS NOTES
2020     Dania Baez (:  1950) is a 78 y.o. female, here for evaluation of the following medical concerns:    HPI  Patient has a recent pruritic skin eruption of the trunk and extremities which developed a week after receiving pneumo vaccine and shingles vaccine. It did not improve with cortisone cream and prednisone so patient ended up going to dermatologist and was told she has an eczema. She was prescribed triamcinolone ointment which seems to clear up skin lesions. She has diabetes with retinopathy somewhat controlled with current medication. She takes long-acting insulin and mealtime insulin plus metformin. She denies hypoglycemic episode. She has hypertension controlled with Maxide and Micardis. She has  obesity and struggling to lose weight. She has venous insufficiency now improved . She has no more leg edema. Review of Systems   Constitutional: Negative for activity change and appetite change. Eyes: Negative for visual disturbance. Respiratory: Negative for shortness of breath. Cardiovascular: Negative for chest pain and leg swelling. Gastrointestinal: Negative for abdominal pain, blood in stool, constipation and diarrhea. Genitourinary: Negative for difficulty urinating, frequency, hematuria, menstrual problem and urgency. Neurological: Negative for dizziness and syncope. Psychiatric/Behavioral: Negative for behavioral problems. Prior to Visit Medications    Medication Sig Taking? Authorizing Provider   potassium chloride (KLOR-CON M) 10 MEQ extended release tablet Take 1 tablet by mouth daily Yes Dorsie Nissen, MD   triamterene-hydroCHLOROthiazide (MAXZIDE-25) 37.5-25 MG per tablet Take 1 tablet by mouth daily Yes Dorsie Nissen, MD   telmisartan (MICARDIS) 80 MG tablet Take 1 tablet by mouth daily Yes Dorsie Nissen, MD   insulin regular (HUMULIN R) 100 UNIT/ML injection Inject 20 units at breakfast; 10 units at lunch; 20 units at dinner.   Patient taking differently: Inject 20 units at breakfast; 20 units at dinner. Yes Jacey Joshi MD   insulin NPH (HUMULIN N) 100 UNIT/ML injection vial Sig: Inject 45 units at breakfast Yes Jacey Joshi MD   ACCU-CHEK GUIDE strip USE 1  TEST STRIP TO CHECK BLOOD SUGAR 4 TIMES DAILY  Jacey Joshi MD   triamcinolone (KENALOG) 0.1 % ointment Apply sparingly to affected area(s) bid prn for flares, up to 2 weeks at a time. Do not apply on cleared skin. Frieda Sidhu MD   metFORMIN (GLUCOPHAGE) 1000 MG tablet Take 1 tablet by mouth 2 times daily (with meals)  Jacey Joshi MD   Insulin Syringe-Needle U-100 31G X 5\" 1 ML MISC Use with both BID Humulin N and TID Humulin R injections = 5 times daily. Jacey Joshi MD        Social History     Tobacco Use    Smoking status: Former Smoker     Packs/day: 0.50     Years: 20.00     Pack years: 10.00     Types: Cigarettes     Last attempt to quit: 3/1/1996     Years since quittin.5    Smokeless tobacco: Never Used   Substance Use Topics    Alcohol use: No        Vitals:    20 1120   BP: 139/70   Pulse: 80   Resp: 18   Temp: 97.2 °F (36.2 °C)   TempSrc: Temporal   SpO2: 99%   Weight: (!) 313 lb (142 kg)     Estimated body mass index is 52.9 kg/m² as calculated from the following:    Height as of 20: 5' 4.5\" (1.638 m). Weight as of this encounter: 313 lb (142 kg). Physical Exam  Constitutional:       General: She is not in acute distress. Appearance: She is well-developed. HENT:      Head: Normocephalic. Eyes:      Conjunctiva/sclera: Conjunctivae normal.   Neck:      Musculoskeletal: Neck supple. Thyroid: No thyromegaly. Cardiovascular:      Rate and Rhythm: Normal rate and regular rhythm. Heart sounds: Normal heart sounds. No murmur. Pulmonary:      Effort: No respiratory distress. Breath sounds: Normal breath sounds. No wheezing or rales. Abdominal:      General: There is no distension.       Palpations: Abdomen is soft.   Musculoskeletal: Normal range of motion. Skin:     General: Skin is warm. Findings: No rash. Neurological:      Mental Status: She is alert and oriented to person, place, and time. Psychiatric:         Behavior: Behavior normal.         ASSESSMENT/PLAN:  1. Type 2 diabetes mellitus with stable proliferative retinopathy of right eye, with long-term current use of insulin ([address]  Fairly controlled. Continue Humulin N 45 units daily, Humulin R 20 units at breakfast and 20 units at supper. Patient does not usually eat lunch and hence does not inject Humulin-R insulin. Continue metformin 1000 mg twice daily    2. Essential hypertension  Fairly controlled. Continue Micardis 40 mg daily and Maxide once a day. If blood pressure is still  Elevated will consider adding amlodipine next visit    3. Morbid obesity due to excess calories ([address]  Encouraged to lose weight with diet and exercise. 4. Venous insufficiency of both lower extremities  Improved. Increase activity to mobilize the fluid in the legs. Keep legs elevated and compression stockings. 5. Eczema, unspecified type  Improved.   Continue Kenalog 0.1% ointment twice a day      RTC in 3 mos    An electronic signature was used to authenticate this note.    --Larry Snyder MD on 9/29/2020 at 11:51 AM

## 2020-10-08 ENCOUNTER — TELEPHONE (OUTPATIENT)
Dept: INTERNAL MEDICINE CLINIC | Age: 70
End: 2020-10-08
Payer: MEDICARE

## 2020-10-08 LAB
CONTROL: PRESENT
HEMOCCULT STL QL: NEGATIVE

## 2020-10-08 PROCEDURE — 82274 ASSAY TEST FOR BLOOD FECAL: CPT | Performed by: FAMILY MEDICINE

## 2021-01-09 DIAGNOSIS — I10 ESSENTIAL HYPERTENSION: ICD-10-CM

## 2021-01-11 RX ORDER — TELMISARTAN 80 MG/1
80 TABLET ORAL DAILY
Qty: 90 TABLET | Refills: 0 | Status: SHIPPED | OUTPATIENT
Start: 2021-01-11 | End: 2021-04-01 | Stop reason: SDUPTHER

## 2021-01-11 RX ORDER — TRIAMTERENE AND HYDROCHLOROTHIAZIDE 37.5; 25 MG/1; MG/1
1 TABLET ORAL DAILY
Qty: 90 TABLET | Refills: 0 | Status: SHIPPED | OUTPATIENT
Start: 2021-01-11 | End: 2021-04-01 | Stop reason: SDUPTHER

## 2021-01-11 RX ORDER — TELMISARTAN 40 MG/1
TABLET ORAL
Qty: 90 TABLET | Refills: 0 | OUTPATIENT
Start: 2021-01-11

## 2021-01-27 ENCOUNTER — OFFICE VISIT (OUTPATIENT)
Dept: PRIMARY CARE CLINIC | Age: 71
End: 2021-01-27
Payer: MEDICARE

## 2021-01-27 VITALS
HEIGHT: 65 IN | HEART RATE: 78 BPM | TEMPERATURE: 97.2 F | DIASTOLIC BLOOD PRESSURE: 59 MMHG | SYSTOLIC BLOOD PRESSURE: 128 MMHG | WEIGHT: 293 LBS | OXYGEN SATURATION: 100 % | RESPIRATION RATE: 18 BRPM | BODY MASS INDEX: 48.82 KG/M2

## 2021-01-27 DIAGNOSIS — Z79.4 TYPE 2 DIABETES MELLITUS WITHOUT COMPLICATION, WITH LONG-TERM CURRENT USE OF INSULIN (HCC): ICD-10-CM

## 2021-01-27 DIAGNOSIS — Z00.00 MEDICARE ANNUAL WELLNESS VISIT, SUBSEQUENT: Primary | ICD-10-CM

## 2021-01-27 DIAGNOSIS — Z79.4 TYPE 2 DIABETES MELLITUS WITH STABLE PROLIFERATIVE RETINOPATHY OF RIGHT EYE, WITH LONG-TERM CURRENT USE OF INSULIN (HCC): ICD-10-CM

## 2021-01-27 DIAGNOSIS — E11.9 TYPE 2 DIABETES MELLITUS WITHOUT COMPLICATION, WITH LONG-TERM CURRENT USE OF INSULIN (HCC): ICD-10-CM

## 2021-01-27 DIAGNOSIS — Z00.00 ROUTINE GENERAL MEDICAL EXAMINATION AT A HEALTH CARE FACILITY: ICD-10-CM

## 2021-01-27 DIAGNOSIS — E11.3551 TYPE 2 DIABETES MELLITUS WITH STABLE PROLIFERATIVE RETINOPATHY OF RIGHT EYE, WITH LONG-TERM CURRENT USE OF INSULIN (HCC): ICD-10-CM

## 2021-01-27 LAB — HBA1C MFR BLD: 8.1 %

## 2021-01-27 PROCEDURE — G0438 PPPS, INITIAL VISIT: HCPCS | Performed by: FAMILY MEDICINE

## 2021-01-27 PROCEDURE — 1123F ACP DISCUSS/DSCN MKR DOCD: CPT | Performed by: FAMILY MEDICINE

## 2021-01-27 PROCEDURE — 83036 HEMOGLOBIN GLYCOSYLATED A1C: CPT | Performed by: FAMILY MEDICINE

## 2021-01-27 PROCEDURE — 4040F PNEUMOC VAC/ADMIN/RCVD: CPT | Performed by: FAMILY MEDICINE

## 2021-01-27 PROCEDURE — 3017F COLORECTAL CA SCREEN DOC REV: CPT | Performed by: FAMILY MEDICINE

## 2021-01-27 PROCEDURE — 3046F HEMOGLOBIN A1C LEVEL >9.0%: CPT | Performed by: FAMILY MEDICINE

## 2021-01-27 PROCEDURE — G8484 FLU IMMUNIZE NO ADMIN: HCPCS | Performed by: FAMILY MEDICINE

## 2021-01-27 RX ORDER — POTASSIUM CHLORIDE 750 MG/1
10 TABLET, EXTENDED RELEASE ORAL EVERY OTHER DAY
Qty: 45 TABLET | Refills: 1
Start: 2021-01-27 | End: 2021-04-01 | Stop reason: SDUPTHER

## 2021-01-27 ASSESSMENT — LIFESTYLE VARIABLES: HOW OFTEN DO YOU HAVE A DRINK CONTAINING ALCOHOL: 0

## 2021-01-27 ASSESSMENT — PATIENT HEALTH QUESTIONNAIRE - PHQ9
SUM OF ALL RESPONSES TO PHQ QUESTIONS 1-9: 0
SUM OF ALL RESPONSES TO PHQ9 QUESTIONS 1 & 2: 0

## 2021-01-27 NOTE — PROGRESS NOTES
Medicare Annual Wellness Visit  Name: Seng Tavares Date: 2021   MRN: [id number] Sex: Female   Age: 78 y.o. Ethnicity: Non-/Non    : 1950 Race: Black      vEelyn Rangel is here for Medicare AWV    Screenings for behavioral, psychosocial and functional/safety risks, and cognitive dysfunction are all negative except as indicated below. These results, as well as other patient data from the [address] form, are documented in Flowsheets linked to this Encounter. Allergies   Allergen Reactions    Cephalexin Itching     Can take Amoxil    Losartan Other (See Comments)     lightheaded         Prior to Visit Medications    Medication Sig Taking? Authorizing Provider   potassium chloride (KLOR-CON M) 10 MEQ extended release tablet Take 1 tablet by mouth every other day Yes Brandt Desai MD   insulin NPH (HUMULIN N) 100 UNIT/ML injection vial Sig: Inject 50 units at breakfast Yes Brandt Desai MD   triamterene-hydroCHLOROthiazide (MAXZIDE-25) 37.5-25 MG per tablet Take 1 tablet by mouth daily Yes Brandt Desai MD   telmisartan (MICARDIS) 80 MG tablet Take 1 tablet by mouth daily Yes Brandt Desai MD   metFORMIN (GLUCOPHAGE) 1000 MG tablet Take 1 tablet by mouth 2 times daily (with meals)  Patient taking differently: Take 1,000 mg by mouth daily (with breakfast)  Yes Brandt Desai MD   ACCU-CHEK GUIDE strip USE 1  TEST STRIP TO CHECK BLOOD SUGAR 4 TIMES DAILY  Brandt Desai MD   insulin regular (HUMULIN R) 100 UNIT/ML injection Inject 20 units at breakfast; 10 units at lunch; 20 units at dinner. Patient taking differently: Inject 20 units at breakfast; 20 units at dinner. Brandt Desai MD   Insulin Syringe-Needle U-100 31G X 5/16\" 1 ML MISC Use with both BID Humulin N and TID Humulin R injections = 5 times daily.   Brandt Desai MD         Past Medical History:   Diagnosis Date    Cataract     Diabetes     Diabetic retinopathy ([address] ENT: tympanic membrane, external ear and ear canal normal bilaterally, nose without deformity, nasal mucosa and turbinates normal without polyps  Neck: supple and non-tender without mass, no thyromegaly or thyroid nodules, no cervical lymphadenopathy  Pulmonary/Chest: clear to auscultation bilaterally- no wheezes, rales or rhonchi, normal air movement, no respiratory distress  Cardiovascular: normal rate, regular rhythm, normal S1 and S2, no murmurs, rubs, clicks, or gallops, distal pulses intact, no carotid bruits  Abdomen: soft, non-tender, non-distended, normal bowel sounds, no masses or organomegaly  Extremities: no cyanosis, clubbing or edema  Musculoskeletal: normal range of motion, no joint swelling, deformity or tenderness  Neurologic: reflexes normal and symmetric, no cranial nerve deficit, gait, coordination and speech normal    Patient's complete Health Risk Assessment and screening values have been reviewed and are found in Flowsheets. The following problems were reviewed today and where indicated follow up appointments were made and/or referrals ordered. Positive Risk Factor Screenings with Interventions:            General Health and ACP:  General  In general, how would you say your health is?: Good  In the past 7 days, have you experienced any of the following?  New or Increased Pain, New or Increased Fatigue, Loneliness, Social Isolation, Stress or Anger?: None of These  Do you get the social and emotional support that you need?: Yes  Do you have a Living Will?: (!) No  Advance Directives     Power of [employer] Will ACP-Advance Directive ACP-Power of     Not on File Not on File Not on File Not on File      General Health Risk Interventions:  · No Living Will: Advance Care Planning addressed with patient today    Health Habits/Nutrition:  Health Habits/Nutrition  Do you exercise for at least 20 minutes 2-3 times per week?: (!) No Have you lost any weight without trying in the past 3 months?: No  Do you eat fewer than 2 meals per day?: No  Have you seen a dentist within the past year?: (!) No  Body mass index: (!) 53.11  Health Habits/Nutrition Interventions:  · Inadequate physical activity:  patient is not ready to increase his/her physical activity level at this time  · Dental exam overdue:  patient encouraged to make appointment with his/her dentist     Safety:  Safety  Do you have working smoke detectors?: Yes  Have all throw rugs been removed or fastened?: Yes  Do you have non-slip mats or surfaces in all bathtubs/showers?: (!) No  Do all of your stairways have a railing or banister?: Yes  Are your doorways, halls and stairs free of clutter?: Yes  Do you always fasten your seatbelt when you are in a car?: Yes  Safety Interventions:  · Home safety tips provided     Personalized Preventive Plan   Current Health Maintenance Status  Immunization History   Administered Date(s) Administered    Influenza, High Dose (Fluzone 65 yrs and older) 11/09/2017    Pneumococcal Conjugate 13-valent ([id number]) 10/29/2018    Pneumococcal Polysaccharide ([id number]) 09/12/2017, 07/13/2020    Tdap (Boostrix, Adacel) 07/13/2020    Zoster Recombinant (Shingrix) 07/13/2020        Health Maintenance   Topic Date Due    Annual Wellness Visit (AWV)  05/29/2019    Lipid screen  12/19/2020    Potassium monitoring  12/19/2020    Creatinine monitoring  12/19/2020    Flu vaccine (1) 06/30/2021 (Originally 9/1/2020)    Shingles Vaccine (2 of 2) 09/29/2021 (Originally 9/7/2020)    Diabetic retinal exam  07/07/2021    A1C test (Diabetic or Prediabetic)  07/13/2021    Diabetic microalbuminuria test  07/13/2021    Diabetic foot exam  07/27/2021    Breast cancer screen  09/18/2021    Colon Cancer Screen FIT/FOBT  10/07/2021    DTaP/Tdap/Td vaccine (2 - Td) 07/13/2030    DEXA (modify frequency per FRAX score)  Completed  Pneumococcal 65+ years Vaccine  Completed    Hepatitis C screen  Completed    Hepatitis A vaccine  Aged Out    Hib vaccine  Aged Out    Meningococcal (ACWY) vaccine  Aged Out     Recommendations for [employer] Due: see orders and patient instructions/AVS.  . Recommended screening schedule for the next 5-10 years is provided to the patient in written form: see Patient Kandis Donovan was seen today for medicare awv. Diagnoses and all orders for this visit:    Medicare annual wellness visit, subsequent    Type 2 diabetes mellitus with stable proliferative retinopathy of right eye, with long-term current use of insulin ([facility])  -     POCT glycosylated hemoglobin (Hb A1C)    Type 2 diabetes mellitus without complication, with long-term current use of insulin ([facility])  -     insulin NPH (HUMULIN N) 100 UNIT/ML injection vial; Sig: Inject 50 units at breakfast    Routine general medical examination at a health care facility    Other orders  -     potassium chloride (KLOR-CON M) 10 MEQ extended release tablet;  Take 1 tablet by mouth every other day

## 2021-02-22 ENCOUNTER — TELEPHONE (OUTPATIENT)
Dept: PRIMARY CARE CLINIC | Age: 71
End: 2021-02-22

## 2021-02-22 DIAGNOSIS — Z79.4 TYPE 2 DIABETES MELLITUS WITHOUT COMPLICATION, WITH LONG-TERM CURRENT USE OF INSULIN (HCC): ICD-10-CM

## 2021-02-22 DIAGNOSIS — E11.9 TYPE 2 DIABETES MELLITUS WITHOUT COMPLICATION, WITH LONG-TERM CURRENT USE OF INSULIN (HCC): ICD-10-CM

## 2021-02-22 RX ORDER — BLOOD SUGAR DIAGNOSTIC
STRIP MISCELLANEOUS
Qty: 400 EACH | Refills: 1 | Status: SHIPPED | OUTPATIENT
Start: 2021-02-22 | End: 2021-08-25

## 2021-04-01 DIAGNOSIS — I10 ESSENTIAL HYPERTENSION: ICD-10-CM

## 2021-04-01 RX ORDER — TELMISARTAN 80 MG/1
80 TABLET ORAL DAILY
Qty: 90 TABLET | Refills: 1 | Status: SHIPPED | OUTPATIENT
Start: 2021-04-01 | End: 2021-06-25 | Stop reason: SDUPTHER

## 2021-04-01 RX ORDER — POTASSIUM CHLORIDE 750 MG/1
10 TABLET, EXTENDED RELEASE ORAL EVERY OTHER DAY
Qty: 45 TABLET | Refills: 1 | Status: SHIPPED | OUTPATIENT
Start: 2021-04-01 | End: 2021-06-25 | Stop reason: SDUPTHER

## 2021-04-01 RX ORDER — TRIAMTERENE AND HYDROCHLOROTHIAZIDE 37.5; 25 MG/1; MG/1
1 TABLET ORAL DAILY
Qty: 90 TABLET | Refills: 1 | Status: SHIPPED | OUTPATIENT
Start: 2021-04-01 | End: 2021-06-25 | Stop reason: SDUPTHER

## 2021-05-04 ENCOUNTER — OFFICE VISIT (OUTPATIENT)
Dept: PRIMARY CARE CLINIC | Age: 71
End: 2021-05-04
Payer: MEDICARE

## 2021-05-04 VITALS
TEMPERATURE: 97.5 F | WEIGHT: 293 LBS | DIASTOLIC BLOOD PRESSURE: 84 MMHG | OXYGEN SATURATION: 96 % | RESPIRATION RATE: 18 BRPM | HEART RATE: 72 BPM | BODY MASS INDEX: 53.32 KG/M2 | SYSTOLIC BLOOD PRESSURE: 148 MMHG

## 2021-05-04 DIAGNOSIS — I10 ESSENTIAL HYPERTENSION: ICD-10-CM

## 2021-05-04 DIAGNOSIS — E11.3551 TYPE 2 DIABETES MELLITUS WITH STABLE PROLIFERATIVE RETINOPATHY OF RIGHT EYE, WITH LONG-TERM CURRENT USE OF INSULIN (HCC): ICD-10-CM

## 2021-05-04 DIAGNOSIS — I87.2 VENOUS INSUFFICIENCY OF BOTH LOWER EXTREMITIES: ICD-10-CM

## 2021-05-04 DIAGNOSIS — I10 ESSENTIAL HYPERTENSION: Primary | ICD-10-CM

## 2021-05-04 DIAGNOSIS — E66.01 MORBID OBESITY DUE TO EXCESS CALORIES (HCC): ICD-10-CM

## 2021-05-04 DIAGNOSIS — Z79.4 TYPE 2 DIABETES MELLITUS WITH STABLE PROLIFERATIVE RETINOPATHY OF RIGHT EYE, WITH LONG-TERM CURRENT USE OF INSULIN (HCC): ICD-10-CM

## 2021-05-04 LAB
A/G RATIO: 1.5 (ref 1.1–2.2)
ALBUMIN SERPL-MCNC: 4.1 G/DL (ref 3.4–5)
ALP BLD-CCNC: 61 U/L (ref 40–129)
ALT SERPL-CCNC: 11 U/L (ref 10–40)
ANION GAP SERPL CALCULATED.3IONS-SCNC: 9 MMOL/L (ref 3–16)
AST SERPL-CCNC: 13 U/L (ref 15–37)
BASOPHILS ABSOLUTE: 0 K/UL (ref 0–0.2)
BASOPHILS RELATIVE PERCENT: 0.8 %
BILIRUB SERPL-MCNC: 0.4 MG/DL (ref 0–1)
BUN BLDV-MCNC: 25 MG/DL (ref 7–20)
CALCIUM SERPL-MCNC: 9.1 MG/DL (ref 8.3–10.6)
CHLORIDE BLD-SCNC: 106 MMOL/L (ref 99–110)
CHOLESTEROL, FASTING: 156 MG/DL (ref 0–199)
CO2: 27 MMOL/L (ref 21–32)
CREAT SERPL-MCNC: 1 MG/DL (ref 0.6–1.2)
EOSINOPHILS ABSOLUTE: 0.3 K/UL (ref 0–0.6)
EOSINOPHILS RELATIVE PERCENT: 4.9 %
GFR AFRICAN AMERICAN: >60
GFR NON-AFRICAN AMERICAN: 55
GLOBULIN: 2.8 G/DL
GLUCOSE FASTING: 183 MG/DL (ref 70–99)
HBA1C MFR BLD: 7.7 %
HCT VFR BLD CALC: 35.4 % (ref 36–48)
HDLC SERPL-MCNC: 62 MG/DL (ref 40–60)
HEMOGLOBIN: 11.9 G/DL (ref 12–16)
LDL CHOLESTEROL CALCULATED: 79 MG/DL
LYMPHOCYTES ABSOLUTE: 1.4 K/UL (ref 1–5.1)
LYMPHOCYTES RELATIVE PERCENT: 24.5 %
MCH RBC QN AUTO: 29.6 PG (ref 26–34)
MCHC RBC AUTO-ENTMCNC: 33.5 G/DL (ref 31–36)
MCV RBC AUTO: 88.2 FL (ref 80–100)
MONOCYTES ABSOLUTE: 0.5 K/UL (ref 0–1.3)
MONOCYTES RELATIVE PERCENT: 9.6 %
NEUTROPHILS ABSOLUTE: 3.5 K/UL (ref 1.7–7.7)
NEUTROPHILS RELATIVE PERCENT: 60.2 %
PDW BLD-RTO: 14.6 % (ref 12.4–15.4)
PLATELET # BLD: 238 K/UL (ref 135–450)
PMV BLD AUTO: 9.6 FL (ref 5–10.5)
POTASSIUM SERPL-SCNC: 4.9 MMOL/L (ref 3.5–5.1)
RBC # BLD: 4.01 M/UL (ref 4–5.2)
SODIUM BLD-SCNC: 142 MMOL/L (ref 136–145)
T4 FREE: 0.9 NG/DL (ref 0.9–1.8)
TOTAL PROTEIN: 6.9 G/DL (ref 6.4–8.2)
TRIGLYCERIDE, FASTING: 77 MG/DL (ref 0–150)
TSH SERPL DL<=0.05 MIU/L-ACNC: 3.43 UIU/ML (ref 0.27–4.2)
VLDLC SERPL CALC-MCNC: 15 MG/DL
WBC # BLD: 5.7 K/UL (ref 4–11)

## 2021-05-04 PROCEDURE — 83036 HEMOGLOBIN GLYCOSYLATED A1C: CPT | Performed by: FAMILY MEDICINE

## 2021-05-04 PROCEDURE — 1123F ACP DISCUSS/DSCN MKR DOCD: CPT | Performed by: FAMILY MEDICINE

## 2021-05-04 PROCEDURE — 3051F HG A1C>EQUAL 7.0%<8.0%: CPT | Performed by: FAMILY MEDICINE

## 2021-05-04 PROCEDURE — 4040F PNEUMOC VAC/ADMIN/RCVD: CPT | Performed by: FAMILY MEDICINE

## 2021-05-04 PROCEDURE — 1090F PRES/ABSN URINE INCON ASSESS: CPT | Performed by: FAMILY MEDICINE

## 2021-05-04 PROCEDURE — G8427 DOCREV CUR MEDS BY ELIG CLIN: HCPCS | Performed by: FAMILY MEDICINE

## 2021-05-04 PROCEDURE — G8399 PT W/DXA RESULTS DOCUMENT: HCPCS | Performed by: FAMILY MEDICINE

## 2021-05-04 PROCEDURE — G8417 CALC BMI ABV UP PARAM F/U: HCPCS | Performed by: FAMILY MEDICINE

## 2021-05-04 PROCEDURE — 1036F TOBACCO NON-USER: CPT | Performed by: FAMILY MEDICINE

## 2021-05-04 PROCEDURE — 2022F DILAT RTA XM EVC RTNOPTHY: CPT | Performed by: FAMILY MEDICINE

## 2021-05-04 PROCEDURE — 99214 OFFICE O/P EST MOD 30 MIN: CPT | Performed by: FAMILY MEDICINE

## 2021-05-04 PROCEDURE — 3017F COLORECTAL CA SCREEN DOC REV: CPT | Performed by: FAMILY MEDICINE

## 2021-05-04 ASSESSMENT — ENCOUNTER SYMPTOMS
ABDOMINAL PAIN: 0
SHORTNESS OF BREATH: 0
BLOOD IN STOOL: 0
DIARRHEA: 0
CONSTIPATION: 0

## 2021-05-04 NOTE — PROGRESS NOTES
(HUMULIN R) 100 UNIT/ML injection Inject 20 units at breakfast; 10 units at lunch; 20 units at dinner. Patient taking differently: Inject 20 units at breakfast; 20 units at dinner. Yes Manish Frye MD   metFORMIN (GLUCOPHAGE) 1000 MG tablet Take 1 tablet by mouth 2 times daily (with meals)  Patient taking differently: Take 1,000 mg by mouth daily (with breakfast)  Yes Manish Frye MD   blood glucose test strips (ACCU-CHEK GUIDE) strip USE 1  TEST STRIP TO CHECK BLOOD SUGAR 4 TIMES DAILY  Manish Frye MD   Insulin Syringe-Needle U-100 31G X \" 1 ML MISC Use with both BID Humulin N and TID Humulin R injections = 5 times daily. Manish Frye MD        Social History     Tobacco Use    Smoking status: Former Smoker     Packs/day: 0.50     Years: 20.00     Pack years: 10.00     Types: Cigarettes     Quit date: 3/1/1996     Years since quittin.1    Smokeless tobacco: Never Used   Substance Use Topics    Alcohol use: No        Vitals:    21 0915 21 0944   BP: (!) 143/70 (!) 148/84   Pulse: 72    Resp: 18    Temp: 97.5 °F (36.4 °C)    TempSrc: Temporal    SpO2: 96%    Weight: (!) 320 lb 6.4 oz (145.3 kg)      Estimated body mass index is 53.32 kg/m² as calculated from the following:    Height as of 21: 5' 5\" (1.651 m). Weight as of this encounter: 320 lb 6.4 oz (145.3 kg). Physical Exam  Constitutional:       Appearance: She is well-developed. HENT:      Head: Normocephalic. Right Ear: External ear normal.      Nose: Nose normal.   Eyes:      Conjunctiva/sclera: Conjunctivae normal.      Pupils: Pupils are equal, round, and reactive to light. Neck:      Musculoskeletal: Normal range of motion and neck supple. Thyroid: No thyromegaly. Cardiovascular:      Rate and Rhythm: Normal rate and regular rhythm. Heart sounds: Normal heart sounds. No murmur. No friction rub.    Pulmonary:      Effort: Pulmonary effort is normal.      Breath sounds: Normal breath

## 2021-06-24 DIAGNOSIS — I10 ESSENTIAL HYPERTENSION: ICD-10-CM

## 2021-06-25 RX ORDER — TELMISARTAN 80 MG/1
80 TABLET ORAL DAILY
Qty: 90 TABLET | Refills: 1 | Status: SHIPPED | OUTPATIENT
Start: 2021-06-25 | End: 2021-11-29

## 2021-06-25 RX ORDER — POTASSIUM CHLORIDE 750 MG/1
10 TABLET, EXTENDED RELEASE ORAL EVERY OTHER DAY
Qty: 45 TABLET | Refills: 1 | Status: SHIPPED | OUTPATIENT
Start: 2021-06-25 | End: 2021-08-10 | Stop reason: SDUPTHER

## 2021-06-25 RX ORDER — TRIAMTERENE AND HYDROCHLOROTHIAZIDE 37.5; 25 MG/1; MG/1
1 TABLET ORAL DAILY
Qty: 90 TABLET | Refills: 1 | Status: SHIPPED | OUTPATIENT
Start: 2021-06-25 | End: 2021-10-28

## 2021-08-10 ENCOUNTER — OFFICE VISIT (OUTPATIENT)
Dept: PRIMARY CARE CLINIC | Age: 71
End: 2021-08-10
Payer: MEDICARE

## 2021-08-10 VITALS
BODY MASS INDEX: 52.98 KG/M2 | DIASTOLIC BLOOD PRESSURE: 62 MMHG | HEART RATE: 78 BPM | OXYGEN SATURATION: 98 % | WEIGHT: 293 LBS | RESPIRATION RATE: 18 BRPM | SYSTOLIC BLOOD PRESSURE: 143 MMHG

## 2021-08-10 DIAGNOSIS — I87.2 VENOUS INSUFFICIENCY OF BOTH LOWER EXTREMITIES: ICD-10-CM

## 2021-08-10 DIAGNOSIS — I10 ESSENTIAL HYPERTENSION: ICD-10-CM

## 2021-08-10 DIAGNOSIS — E11.9 TYPE 2 DIABETES MELLITUS WITHOUT COMPLICATION, WITH LONG-TERM CURRENT USE OF INSULIN (HCC): ICD-10-CM

## 2021-08-10 DIAGNOSIS — Z79.4 TYPE 2 DIABETES MELLITUS WITHOUT COMPLICATION, WITH LONG-TERM CURRENT USE OF INSULIN (HCC): ICD-10-CM

## 2021-08-10 DIAGNOSIS — E66.01 MORBID OBESITY DUE TO EXCESS CALORIES (HCC): ICD-10-CM

## 2021-08-10 DIAGNOSIS — Z12.31 ENCOUNTER FOR SCREENING MAMMOGRAM FOR MALIGNANT NEOPLASM OF BREAST: ICD-10-CM

## 2021-08-10 PROBLEM — L03.116 CELLULITIS OF LEFT LEG: Status: RESOLVED | Noted: 2020-07-13 | Resolved: 2021-08-10

## 2021-08-10 LAB — HBA1C MFR BLD: 8 %

## 2021-08-10 PROCEDURE — 99214 OFFICE O/P EST MOD 30 MIN: CPT | Performed by: FAMILY MEDICINE

## 2021-08-10 PROCEDURE — G8399 PT W/DXA RESULTS DOCUMENT: HCPCS | Performed by: FAMILY MEDICINE

## 2021-08-10 PROCEDURE — 83036 HEMOGLOBIN GLYCOSYLATED A1C: CPT | Performed by: FAMILY MEDICINE

## 2021-08-10 PROCEDURE — 1090F PRES/ABSN URINE INCON ASSESS: CPT | Performed by: FAMILY MEDICINE

## 2021-08-10 PROCEDURE — 1036F TOBACCO NON-USER: CPT | Performed by: FAMILY MEDICINE

## 2021-08-10 PROCEDURE — 3017F COLORECTAL CA SCREEN DOC REV: CPT | Performed by: FAMILY MEDICINE

## 2021-08-10 PROCEDURE — 1123F ACP DISCUSS/DSCN MKR DOCD: CPT | Performed by: FAMILY MEDICINE

## 2021-08-10 PROCEDURE — G8427 DOCREV CUR MEDS BY ELIG CLIN: HCPCS | Performed by: FAMILY MEDICINE

## 2021-08-10 PROCEDURE — 3052F HG A1C>EQUAL 8.0%<EQUAL 9.0%: CPT | Performed by: FAMILY MEDICINE

## 2021-08-10 PROCEDURE — 4040F PNEUMOC VAC/ADMIN/RCVD: CPT | Performed by: FAMILY MEDICINE

## 2021-08-10 PROCEDURE — 2022F DILAT RTA XM EVC RTNOPTHY: CPT | Performed by: FAMILY MEDICINE

## 2021-08-10 PROCEDURE — G8417 CALC BMI ABV UP PARAM F/U: HCPCS | Performed by: FAMILY MEDICINE

## 2021-08-10 RX ORDER — POTASSIUM CHLORIDE 750 MG/1
10 TABLET, EXTENDED RELEASE ORAL EVERY OTHER DAY
Qty: 90 TABLET | Refills: 1 | Status: SHIPPED | OUTPATIENT
Start: 2021-08-10 | End: 2022-05-10

## 2021-08-10 RX ORDER — AMLODIPINE BESYLATE 5 MG/1
5 TABLET ORAL DAILY
Qty: 90 TABLET | Refills: 1 | Status: SHIPPED | OUTPATIENT
Start: 2021-08-10 | End: 2021-12-27

## 2021-08-10 RX ORDER — ALLOPURINOL 300 MG/1
300 TABLET ORAL DAILY
COMMUNITY
Start: 2021-07-01 | End: 2022-02-09 | Stop reason: SDUPTHER

## 2021-08-10 ASSESSMENT — ENCOUNTER SYMPTOMS
BLOOD IN STOOL: 0
CONSTIPATION: 0
ABDOMINAL PAIN: 0
DIARRHEA: 0
SHORTNESS OF BREATH: 0

## 2021-08-10 NOTE — PROGRESS NOTES
(GLUCOPHAGE) 1000 MG tablet Take 1 tablet by mouth 2 times daily (with meals)  Patient taking differently: Take 1,000 mg by mouth daily (with breakfast)  Yes Ismael Hudson MD   telmisartan (MICARDIS) 80 MG tablet Take 1 tablet by mouth daily Yes Ismael Hudson MD   insulin regular (HUMULIN R) 100 UNIT/ML injection Inject 20 units at breakfast; 10 units at lunch; 20 units at dinner. Patient taking differently: Inject 20 units at breakfast; 20 units at dinner. Yes Ismael Hudson MD   blood glucose test strips (ACCU-CHEK GUIDE) strip USE 1  TEST STRIP TO CHECK BLOOD SUGAR 4 TIMES DAILY  Ismael Hudson MD   Insulin Syringe-Needle U-100 31G X 5\" 1 ML MISC Use with both BID Humulin N and TID Humulin R injections = 5 times daily. Ismael Hudson MD        Social History     Tobacco Use    Smoking status: Former Smoker     Packs/day: 0.50     Years: 20.00     Pack years: 10.00     Types: Cigarettes     Quit date: 3/1/1996     Years since quittin.4    Smokeless tobacco: Never Used   Substance Use Topics    Alcohol use: No        Vitals:    08/10/21 1039   BP: (!) 143/62   Pulse: 78   Resp: 18   SpO2: 98%   Weight: (!) 318 lb 6.4 oz (144.4 kg)     Estimated body mass index is 52.98 kg/m² as calculated from the following:    Height as of 21: 5' 5\" (1.651 m). Weight as of this encounter: 318 lb 6.4 oz (144.4 kg). Physical Exam  Constitutional:       Appearance: She is well-developed. HENT:      Head: Normocephalic. Right Ear: External ear normal.      Nose: Nose normal.   Eyes:      Conjunctiva/sclera: Conjunctivae normal.      Pupils: Pupils are equal, round, and reactive to light. Neck:      Thyroid: No thyromegaly. Cardiovascular:      Rate and Rhythm: Normal rate and regular rhythm. Heart sounds: Normal heart sounds. No murmur heard. No friction rub. Pulmonary:      Effort: Pulmonary effort is normal.      Breath sounds: Normal breath sounds.    Abdominal:      General: Bowel sounds are normal.      Palpations: Abdomen is soft. There is no mass. Musculoskeletal:         General: Normal range of motion. Cervical back: Normal range of motion and neck supple. Lymphadenopathy:      Cervical: No cervical adenopathy. Skin:     General: Skin is warm. Findings: No rash. Neurological:      Mental Status: She is alert and oriented to person, place, and time. ASSESSMENT/PLAN:  1. Type 2 diabetes mellitus without complication, with long-term current use of insulin ([facility])  HbA1c today is 8%. Will increase NPH insulin to 54 units daily. Continue Humulin R 20 units at breakfast and 20 units at supper. Patient reported that she does not usually eat lunch and so was not taking 10 units of regular insulin  - insulin NPH (HUMULIN N) 100 UNIT/ML injection vial; Sig: Inject 54 units at breakfast  Dispense: 44 mL; Refill: 1    2. Essential hypertension  Blood pressure still elevated. Will add amlodipine 5 mg daily. Continue Micardis 80 mg daily and Maxide 1 tablet daily. 3. Venous insufficiency of both lower extremities  Continue compression stockings. 4. Morbid obesity due to excess calories ([address]  Struggled to lose weight. Advised diet and exercise    5. Encounter for screening mammogram for malignant neoplasm of breast  - AVEL DIGITAL SCREEN W OR WO CAD BILATERAL;  Future      RTC in 3 mos    An electronic signature was used to authenticate this note.    --Meena Verma MD on 8/10/2021 at 12:44 PM

## 2021-08-25 DIAGNOSIS — E11.9 TYPE 2 DIABETES MELLITUS WITHOUT COMPLICATION, WITH LONG-TERM CURRENT USE OF INSULIN (HCC): ICD-10-CM

## 2021-08-25 DIAGNOSIS — Z79.4 TYPE 2 DIABETES MELLITUS WITHOUT COMPLICATION, WITH LONG-TERM CURRENT USE OF INSULIN (HCC): ICD-10-CM

## 2021-08-25 RX ORDER — BLOOD SUGAR DIAGNOSTIC
STRIP MISCELLANEOUS
Qty: 400 EACH | Refills: 1 | Status: SHIPPED | OUTPATIENT
Start: 2021-08-25 | End: 2022-01-18

## 2021-09-20 NOTE — PATIENT INSTRUCTIONS
Personalized Preventive Plan for Tayla Baez - 1/27/2021  Medicare offers a range of preventive health benefits. Some of the tests and screenings are paid in full while other may be subject to a deductible, co-insurance, and/or copay. Some of these benefits include a comprehensive review of your medical history including lifestyle, illnesses that may run in your family, and various assessments and screenings as appropriate. After reviewing your medical record and screening and assessments performed today your provider may have ordered immunizations, labs, imaging, and/or referrals for you. A list of these orders (if applicable) as well as your Preventive Care list are included within your After Visit Summary for your review. Other Preventive Recommendations:    · A preventive eye exam performed by an eye specialist is recommended every 1-2 years to screen for glaucoma; cataracts, macular degeneration, and other eye disorders. · A preventive dental visit is recommended every 6 months. · Try to get at least 150 minutes of exercise per week or 10,000 steps per day on a pedometer . · Order or download the FREE \"Exercise & Physical Activity: Your Everyday Guide\" from [employer]. Call 8-185.514.2825 or search [employer] online. · You need 1175-9668 mg of calcium and 1406-6775 IU of vitamin D per day. It is possible to meet your calcium requirement with diet alone, but a vitamin D supplement is usually necessary to meet this goal.  · When exposed to the sun, use a sunscreen that protects against both UVA and UVB radiation with an SPF of 30 or greater. Reapply every 2 to 3 hours or after sweating, drying off with a towel, or swimming. · Always wear a seat belt when traveling in a car. Always wear a helmet when riding a bicycle or motorcycle. yes

## 2021-09-28 ENCOUNTER — TELEPHONE (OUTPATIENT)
Dept: PHARMACY | Facility: CLINIC | Age: 71
End: 2021-09-28

## 2021-09-28 NOTE — TELEPHONE ENCOUNTER
Incoming call received from the patient -     Discussed metformin and telmisartan adherence. Patient states that [facility] sends her refills to her when she is due for a refill. Advised patient that it does not appear that they do that, but clinical pharmacist has contacted the pharmacy today on her behalf and has refilled these medications. They should arrive in the mail shortly. Patient thankful for the help. Discussed statin therapy in detail - patient refuses trial of another statin. States that her cholesterol is not elevated and she experienced side effects (joint pain/stiffness) when she was prescribed atorvastatin therapy previously and doesn't feel that there is benefit in taking a medication that will cause side effects if she doesn't need it. Education provided and discussed the importance of statin therapy in patients with diabetes and risk reduction. Patient is not interested in therapy. Thanked patient for the return call. Will send to initiating pharmacist for review. Will sign off.      Lowell Hallman, Omar, Deena // Department, toll free [phone], option 1

## 2021-09-28 NOTE — TELEPHONE ENCOUNTER
[facility] CLINICAL PHARMACY: STATIN THERAPY REVIEW  Identified statin use in persons with diabetes and/or cardiovascular disease care gap per [facility]; Records dated: 09/19/2021    Last Office Visit: 08/10/2021 Primary Care - Date MD Manuel    Patient also appears to be taking: Insulin NPH, Insulin R, Metformin 1000 mg, Telmisartan 80 mg    Allergies   Allergen Reactions    Cephalexin Itching     Can take Amoxil    Losartan Other (See Comments)     lightheaded       ASSESSMENT  ACE/ARB ADHERENCE    Per Insurance Records through 09/19/2021 (YTD [facility] = 100%; Potential Fail Date: 12/18/2021):   Telmisartan 80 mg daily last filled on 06/25/2021 for 90 day supply; Next refill due: 09/23/2021    Per Reconciled Dispense Report: as of 08/25/2021  Telmisartan 80 mg daily last filled on 06/25/2021 for 90 day supply     Per [facility]:   Telmisartan 80 mg daily last picked up on 06/25/2021 for 90 day supply, 1 refills remaining  Pharmacy will refill for $0 copay    BP Readings from Last 3 Encounters:   08/10/21 (!) 143/62   05/04/21 (!) 148/84   01/27/21 (!) 128/59     Lab Results   Component Value Date    CREATININE 1.0 05/04/2021     CrCl cannot be calculated (Patient's most recent lab result is older than the maximum 120 days allowed. ).   Lab Results   Component Value Date    LABGLOM 55 05/04/2021       DIABETES ADHERENCE    Per Reconciled Dispense Report: as of 08/25/2021  Metformin 1000 mg twice daily last filled on 06/25/2021 for 90 day supply     Per [facility]:   Metformin 1000 mg twice daily last picked up on 06/25/2021 for 90 day supply, 1 refills remaining  Pharmacy will refill for $0 copay    Lab Results   Component Value Date    LABA1C 8.0 08/10/2021    LABA1C 7.7 05/04/2021    LABA1C 8.1 01/27/2021     NOTE A1c <9%    STATIN GAP IDENTIFIED    Per chart review, patient is not currently prescribed a statin  · Previously prescribed atorvastatin 20 mg daily  · Patient declined statin during August 2020 [facility] clinical pharmacy outreach  · Patient reported joint pain that resolved after discontinuation of statin    Per Reconciled Dispense Report:  Atorvastatin 20 mg daily last filled on 03/13/2020 for 90 day supply from unknown pharmacy      Lab Results   Component Value Date    CHOL 147 09/23/2017    TRIG 72 09/23/2017    HDL 62 (H) 05/04/2021    LDLCALC 79 05/04/2021     ALT   Date Value Ref Range Status   05/04/2021 11 10 - 40 U/L Final     AST   Date Value Ref Range Status   05/04/2021 13 (L) 15 - 37 U/L Final       The 10-year ASCVD risk score (Urvashi Solis, et al., 2013) is: 25.1%    Values used to calculate the score:      Age: 73 years      Sex: Female      Is Non- : Yes      Diabetic: Yes      Tobacco smoker: No      Systolic Blood Pressure: 385 mmHg      Is BP treated: Yes      HDL Cholesterol: 62 mg/dL      Total Cholesterol: 156 mg/dL     2021 ADA Guidelines Age:    Patient >/= 36years old:   o History of ASCVD or 10-year ASCVD risk > 20% - High-intensity statin is recommended. PLAN  The following are interventions that have been identified:   - Patient overdue refilling Metformin and Telmisartan and active on home medication list.   - Patient identified as having SUPD gap     Some strategies to help tolerate statins could be three time weekly dosing and while the 2018 AHA/ACC Guideline on the Management of Blood Cholesterol makes no recommendation for using coenzyme Q10 with statins or ensuring patient is not vitamin D deficient, there is some data that suggest these could be beneficial.    Attempting to reach patient to review.  Left message asking for return call. · Refilled Metformin and Telmisartan through [facility] (copay $0)    Future Appointments   Date Time Provider Fredy Allen   11/19/2021 10:30 AM MD Kizzy Bolivar RD, Pharm. D.  PGY2 [id number] [facility] Clinical Pharmacy  Department, toll free: [phone], option 1

## 2021-10-14 DIAGNOSIS — Z79.4 TYPE 2 DIABETES MELLITUS WITHOUT COMPLICATION, WITH LONG-TERM CURRENT USE OF INSULIN (HCC): ICD-10-CM

## 2021-10-14 DIAGNOSIS — E11.9 TYPE 2 DIABETES MELLITUS WITHOUT COMPLICATION, WITH LONG-TERM CURRENT USE OF INSULIN (HCC): ICD-10-CM

## 2021-10-22 ENCOUNTER — HOSPITAL ENCOUNTER (OUTPATIENT)
Dept: MAMMOGRAPHY | Age: 71
Discharge: HOME | End: 2021-10-27
Payer: MEDICARE

## 2021-10-22 VITALS — HEIGHT: 65 IN | WEIGHT: 293 LBS | BODY MASS INDEX: 48.82 KG/M2

## 2021-10-22 DIAGNOSIS — Z12.31 VISIT FOR SCREENING MAMMOGRAM: ICD-10-CM

## 2021-10-22 PROCEDURE — 77063 BREAST TOMOSYNTHESIS BI: CPT

## 2021-10-28 DIAGNOSIS — I10 ESSENTIAL HYPERTENSION: ICD-10-CM

## 2021-10-28 RX ORDER — TRIAMTERENE AND HYDROCHLOROTHIAZIDE 37.5; 25 MG/1; MG/1
1 TABLET ORAL DAILY
Qty: 90 TABLET | Refills: 1 | Status: SHIPPED | OUTPATIENT
Start: 2021-10-28 | End: 2022-05-03

## 2021-11-18 ENCOUNTER — TELEPHONE (OUTPATIENT)
Dept: PHARMACY | Facility: CLINIC | Age: 71
End: 2021-11-18

## 2021-11-18 NOTE — TELEPHONE ENCOUNTER
Dr. Eric Ahumada MD,    Patient's insurance has identified statin use in persons with diabetes (SUPD) care gap. At your next visit, could you please drop the code T46.6X5A so insurance is aware patient is unable to tolerate statins? (Code needs to be added at an office/virtual visit to count for [place])    Last visit: 08/10/2021, Next visit: 11/19/2021     See encounter note(s) below for complete details. Please let me know if you have any questions. Thank you,  Shayla Sutton, Pharm. [address] [facility], toll free: [phone], option 1

## 2021-11-19 ENCOUNTER — OFFICE VISIT (OUTPATIENT)
Dept: PRIMARY CARE CLINIC | Age: 71
End: 2021-11-19
Payer: MEDICARE

## 2021-11-19 VITALS
OXYGEN SATURATION: 93 % | BODY MASS INDEX: 53.58 KG/M2 | RESPIRATION RATE: 20 BRPM | WEIGHT: 293 LBS | SYSTOLIC BLOOD PRESSURE: 115 MMHG | HEART RATE: 88 BPM | DIASTOLIC BLOOD PRESSURE: 53 MMHG

## 2021-11-19 DIAGNOSIS — E66.01 MORBID OBESITY DUE TO EXCESS CALORIES (HCC): ICD-10-CM

## 2021-11-19 DIAGNOSIS — Z79.4 TYPE 2 DIABETES MELLITUS WITH STABLE PROLIFERATIVE RETINOPATHY OF RIGHT EYE, WITH LONG-TERM CURRENT USE OF INSULIN (HCC): ICD-10-CM

## 2021-11-19 DIAGNOSIS — I10 ESSENTIAL HYPERTENSION: ICD-10-CM

## 2021-11-19 DIAGNOSIS — I87.2 VENOUS INSUFFICIENCY OF BOTH LOWER EXTREMITIES: ICD-10-CM

## 2021-11-19 DIAGNOSIS — E11.3551 TYPE 2 DIABETES MELLITUS WITH STABLE PROLIFERATIVE RETINOPATHY OF RIGHT EYE, WITH LONG-TERM CURRENT USE OF INSULIN (HCC): ICD-10-CM

## 2021-11-19 DIAGNOSIS — J06.9 ACUTE URI: ICD-10-CM

## 2021-11-19 LAB — HBA1C MFR BLD: 7.5 %

## 2021-11-19 PROCEDURE — 3017F COLORECTAL CA SCREEN DOC REV: CPT | Performed by: FAMILY MEDICINE

## 2021-11-19 PROCEDURE — G8427 DOCREV CUR MEDS BY ELIG CLIN: HCPCS | Performed by: FAMILY MEDICINE

## 2021-11-19 PROCEDURE — 99214 OFFICE O/P EST MOD 30 MIN: CPT | Performed by: FAMILY MEDICINE

## 2021-11-19 PROCEDURE — 1090F PRES/ABSN URINE INCON ASSESS: CPT | Performed by: FAMILY MEDICINE

## 2021-11-19 PROCEDURE — G8399 PT W/DXA RESULTS DOCUMENT: HCPCS | Performed by: FAMILY MEDICINE

## 2021-11-19 PROCEDURE — G8417 CALC BMI ABV UP PARAM F/U: HCPCS | Performed by: FAMILY MEDICINE

## 2021-11-19 PROCEDURE — 83036 HEMOGLOBIN GLYCOSYLATED A1C: CPT | Performed by: FAMILY MEDICINE

## 2021-11-19 PROCEDURE — 3051F HG A1C>EQUAL 7.0%<8.0%: CPT | Performed by: FAMILY MEDICINE

## 2021-11-19 PROCEDURE — 1036F TOBACCO NON-USER: CPT | Performed by: FAMILY MEDICINE

## 2021-11-19 PROCEDURE — 4040F PNEUMOC VAC/ADMIN/RCVD: CPT | Performed by: FAMILY MEDICINE

## 2021-11-19 PROCEDURE — 2022F DILAT RTA XM EVC RTNOPTHY: CPT | Performed by: FAMILY MEDICINE

## 2021-11-19 PROCEDURE — G8484 FLU IMMUNIZE NO ADMIN: HCPCS | Performed by: FAMILY MEDICINE

## 2021-11-19 PROCEDURE — 1123F ACP DISCUSS/DSCN MKR DOCD: CPT | Performed by: FAMILY MEDICINE

## 2021-11-19 RX ORDER — GUAIFENESIN 600 MG/1
1200 TABLET, EXTENDED RELEASE ORAL 2 TIMES DAILY PRN
Qty: 30 TABLET | Refills: 1 | Status: SHIPPED | OUTPATIENT
Start: 2021-11-19 | End: 2022-02-21

## 2021-11-19 RX ORDER — AZITHROMYCIN 250 MG/1
TABLET, FILM COATED ORAL
Qty: 1 PACKET | Refills: 0 | Status: SHIPPED | OUTPATIENT
Start: 2021-11-19 | End: 2021-11-29

## 2021-11-19 ASSESSMENT — ENCOUNTER SYMPTOMS
CONSTIPATION: 0
DIARRHEA: 0
SHORTNESS OF BREATH: 0
BLOOD IN STOOL: 0
ABDOMINAL PAIN: 0

## 2021-11-19 NOTE — PROGRESS NOTES
2021     Dania Baez (:  1950) is a 73 y.o. female, here for evaluation of the following medical concerns:    HPI  Patient presented to the office for regular follow-up. She thinks that she has respiratory infection. She has coughing with mucus. She has sore throat. Denies fever and chills denies loss of smell or taste. She is fully vaccinated with COVID-19. She has diabetes controlled with Humulin and Humulin R. Denies hypoglycemic episode. HbA1c today 7.5% with tremors is better than last visit. She has hypertension controlled with amlodipine, Micardis and Maxide. She is morbidly obese and is struggling to lose weight. She has venous insufficiency but leg edema is better today. She denies chest pain or shortness of breath denies bowel or urinary disturbance. Review of Systems   Constitutional: Negative for activity change and appetite change. Eyes: Negative for visual disturbance. Respiratory: Negative for shortness of breath. Cardiovascular: Negative for chest pain and leg swelling. Gastrointestinal: Negative for abdominal pain, blood in stool, constipation and diarrhea. Genitourinary: Negative for difficulty urinating, frequency, hematuria, menstrual problem and urgency. Neurological: Negative for dizziness and syncope. Psychiatric/Behavioral: Negative for behavioral problems. Prior to Visit Medications    Medication Sig Taking? Authorizing Provider   azithromycin (ZITHROMAX Z-HILLARY) 250 MG tablet Take 2 tablets on day 1, then 1 tablet daily for the next 4 days.  Yes Gilbert Tobias MD   guaiFENesin (MUCINEX) 600 MG extended release tablet Take 2 tablets by mouth 2 times daily as needed for Congestion Yes Gilbert Tobias MD   triamterene-hydroCHLOROthiazide (MAXZIDE-25) 37.5-25 MG per tablet Take 1 tablet by mouth daily Yes Gilbetr Tobias MD   amLODIPine (NORVASC) 5 MG tablet Take 1 tablet by mouth daily Yes Gilbert Tobias MD   insulin NPH (HUMULIN N) 100 UNIT/ML injection vial INJECT SUBCUTANEOUSLY 54  UNITS AT Boy Moran MD   blood glucose test strips (ACCU-CHEK GUIDE) strip USE  STRIP TO CHECK GLUCOSE 4 TIMES DAILY  Gilbert Tobias MD   allopurinol (ZYLOPRIM) 300 MG tablet Take 300 mg by mouth daily  Historical Provider, MD   potassium chloride (KLOR-CON M) 10 MEQ extended release tablet Take 1 tablet by mouth every other day  Gilbert Tobias MD   metFORMIN (GLUCOPHAGE) 1000 MG tablet Take 1 tablet by mouth 2 times daily (with meals)  Patient taking differently: Take 1,000 mg by mouth daily (with breakfast)   Gilbert Tobias MD   telmisartan (MICARDIS) 80 MG tablet Take 1 tablet by mouth daily  Gilbert Tobias MD   insulin regular (HUMULIN R) 100 UNIT/ML injection Inject 20 units at breakfast; 10 units at lunch; 20 units at dinner. Patient taking differently: Inject 20 units at breakfast; 20 units at dinner. Gilbert Tobias MD   Insulin Syringe-Needle U-100 31G X 5/16\" 1 ML MISC Use with both BID Humulin N and TID Humulin R injections = 5 times daily. Gilbert Tobias MD        Social History     Tobacco Use    Smoking status: Former Smoker     Packs/day: 0.50     Years: 20.00     Pack years: 10.00     Types: Cigarettes     Quit date: 3/1/1996     Years since quittin.7    Smokeless tobacco: Never Used   Substance Use Topics    Alcohol use: No        Vitals:    21 1033   BP: (!) 115/53   Pulse: 88   Resp: 20   SpO2: 93%   Weight: (!) 322 lb (146.1 kg)     Estimated body mass index is 53.58 kg/m² as calculated from the following:    Height as of 10/22/21: 5' 5\" (1.651 m). Weight as of this encounter: 322 lb (146.1 kg). Physical Exam  Constitutional:       Appearance: She is well-developed. HENT:      Head: Normocephalic. Right Ear: External ear normal.      Nose: Nose normal.   Eyes:      Conjunctiva/sclera: Conjunctivae normal.      Pupils: Pupils are equal, round, and reactive to light.    Neck:      Thyroid: No thyromegaly. Cardiovascular:      Rate and Rhythm: Normal rate and regular rhythm. Heart sounds: Normal heart sounds. No murmur heard. No friction rub. Pulmonary:      Effort: Pulmonary effort is normal.      Breath sounds: Normal breath sounds. Abdominal:      General: Bowel sounds are normal.      Palpations: Abdomen is soft. There is no mass. Musculoskeletal:         General: Normal range of motion. Cervical back: Normal range of motion and neck supple. Lymphadenopathy:      Cervical: No cervical adenopathy. Skin:     General: Skin is warm. Findings: No rash. Neurological:      Mental Status: She is alert and oriented to person, place, and time. ASSESSMENT/PLAN:  1. Acute URI  She requested antibiotics. May take over-the-counter Mucinex. - azithromycin (ZITHROMAX Z-HILLARY) 250 MG tablet; Take 2 tablets on day 1, then 1 tablet daily for the next 4 days. Dispense: 1 packet; Refill: 0    2. Type 2 diabetes mellitus with stable proliferative retinopathy of right eye, with long-term current use of insulin ([address]  Fairly controlled. HbA1c today 7.5%. Continue Humulin and and Humulin R.  - POCT glycosylated hemoglobin (Hb A1C)    3. Essential hypertension  Controlled. Continue amlodipine 5 mg daily, Micardis 80 mg daily and Maxide 1 tablet daily    4. Morbid obesity due to excess calories ([address]  Patient to lose weight with diet and exercise. 5. Venous insufficiency of both lower extremities  Controlled. Advise compression socks or stockings.       RTC in 6 mos    An electronic signature was used to authenticate this note.    --Coco Chambers MD on 11/19/2021 at 11:06 AM

## 2021-11-29 RX ORDER — TELMISARTAN 80 MG/1
80 TABLET ORAL DAILY
Qty: 90 TABLET | Refills: 1 | Status: SHIPPED | OUTPATIENT
Start: 2021-11-29 | End: 2022-05-16

## 2021-12-27 RX ORDER — AMLODIPINE BESYLATE 5 MG/1
5 TABLET ORAL DAILY
Qty: 90 TABLET | Refills: 1 | Status: SHIPPED | OUTPATIENT
Start: 2021-12-27 | End: 2022-07-18

## 2022-01-18 DIAGNOSIS — E11.9 TYPE 2 DIABETES MELLITUS WITHOUT COMPLICATION, WITH LONG-TERM CURRENT USE OF INSULIN (HCC): ICD-10-CM

## 2022-01-18 DIAGNOSIS — Z79.4 TYPE 2 DIABETES MELLITUS WITHOUT COMPLICATION, WITH LONG-TERM CURRENT USE OF INSULIN (HCC): ICD-10-CM

## 2022-01-18 RX ORDER — BLOOD SUGAR DIAGNOSTIC
STRIP MISCELLANEOUS
Qty: 400 EACH | Refills: 1 | Status: SHIPPED | OUTPATIENT
Start: 2022-01-18 | End: 2022-09-21

## 2022-01-22 DIAGNOSIS — Z79.4 TYPE 2 DIABETES MELLITUS WITHOUT COMPLICATION, WITH LONG-TERM CURRENT USE OF INSULIN (HCC): ICD-10-CM

## 2022-01-22 DIAGNOSIS — E11.9 TYPE 2 DIABETES MELLITUS WITHOUT COMPLICATION, WITH LONG-TERM CURRENT USE OF INSULIN (HCC): ICD-10-CM

## 2022-01-22 RX ORDER — BLOOD SUGAR DIAGNOSTIC
STRIP MISCELLANEOUS
Qty: 400 EACH | Refills: 0 | OUTPATIENT
Start: 2022-01-22

## 2022-01-27 LAB
CONTROL: NEGATIVE
HEMOCCULT STL QL: POSITIVE

## 2022-01-31 ENCOUNTER — TELEPHONE (OUTPATIENT)
Dept: PRIMARY CARE CLINIC | Age: 72
End: 2022-01-31
Payer: MEDICARE

## 2022-01-31 DIAGNOSIS — Z12.11 COLON CANCER SCREENING: Primary | ICD-10-CM

## 2022-01-31 PROCEDURE — 82274 ASSAY TEST FOR BLOOD FECAL: CPT | Performed by: FAMILY MEDICINE

## 2022-02-05 DIAGNOSIS — Z79.4 TYPE 2 DIABETES MELLITUS WITHOUT COMPLICATION, WITH LONG-TERM CURRENT USE OF INSULIN (HCC): ICD-10-CM

## 2022-02-05 DIAGNOSIS — E11.9 TYPE 2 DIABETES MELLITUS WITHOUT COMPLICATION, WITH LONG-TERM CURRENT USE OF INSULIN (HCC): ICD-10-CM

## 2022-02-05 RX ORDER — BLOOD SUGAR DIAGNOSTIC
STRIP MISCELLANEOUS
Qty: 400 EACH | Refills: 0 | OUTPATIENT
Start: 2022-02-05

## 2022-02-10 RX ORDER — ALLOPURINOL 300 MG/1
300 TABLET ORAL DAILY
Qty: 90 TABLET | Refills: 1 | Status: SHIPPED | OUTPATIENT
Start: 2022-02-10

## 2022-02-21 ENCOUNTER — OFFICE VISIT (OUTPATIENT)
Dept: PRIMARY CARE CLINIC | Age: 72
End: 2022-02-21
Payer: MEDICARE

## 2022-02-21 VITALS
SYSTOLIC BLOOD PRESSURE: 115 MMHG | BODY MASS INDEX: 48.82 KG/M2 | HEIGHT: 65 IN | WEIGHT: 293 LBS | OXYGEN SATURATION: 98 % | HEART RATE: 78 BPM | DIASTOLIC BLOOD PRESSURE: 58 MMHG

## 2022-02-21 DIAGNOSIS — Z79.4 TYPE 2 DIABETES MELLITUS WITH STABLE PROLIFERATIVE RETINOPATHY OF RIGHT EYE, WITH LONG-TERM CURRENT USE OF INSULIN (HCC): Primary | ICD-10-CM

## 2022-02-21 DIAGNOSIS — E11.3551 TYPE 2 DIABETES MELLITUS WITH STABLE PROLIFERATIVE RETINOPATHY OF RIGHT EYE, WITH LONG-TERM CURRENT USE OF INSULIN (HCC): Primary | ICD-10-CM

## 2022-02-21 DIAGNOSIS — Z00.00 MEDICARE ANNUAL WELLNESS VISIT, SUBSEQUENT: ICD-10-CM

## 2022-02-21 LAB — HBA1C MFR BLD: 7.9 %

## 2022-02-21 PROCEDURE — 1123F ACP DISCUSS/DSCN MKR DOCD: CPT | Performed by: FAMILY MEDICINE

## 2022-02-21 PROCEDURE — 3017F COLORECTAL CA SCREEN DOC REV: CPT | Performed by: FAMILY MEDICINE

## 2022-02-21 PROCEDURE — G0439 PPPS, SUBSEQ VISIT: HCPCS | Performed by: FAMILY MEDICINE

## 2022-02-21 PROCEDURE — 3051F HG A1C>EQUAL 7.0%<8.0%: CPT | Performed by: FAMILY MEDICINE

## 2022-02-21 PROCEDURE — G8484 FLU IMMUNIZE NO ADMIN: HCPCS | Performed by: FAMILY MEDICINE

## 2022-02-21 PROCEDURE — 83036 HEMOGLOBIN GLYCOSYLATED A1C: CPT | Performed by: FAMILY MEDICINE

## 2022-02-21 PROCEDURE — 4040F PNEUMOC VAC/ADMIN/RCVD: CPT | Performed by: FAMILY MEDICINE

## 2022-02-21 RX ORDER — BLOOD-GLUCOSE TRANSMITTER
EACH MISCELLANEOUS
Qty: 1 EACH | Refills: 0 | Status: SHIPPED | OUTPATIENT
Start: 2022-02-21

## 2022-02-21 RX ORDER — BLOOD-GLUCOSE TRANSMITTER
EACH MISCELLANEOUS
Qty: 1 EACH | Refills: 0 | Status: SHIPPED | OUTPATIENT
Start: 2022-02-21 | End: 2022-02-21 | Stop reason: SDUPTHER

## 2022-02-21 RX ORDER — BLOOD-GLUCOSE SENSOR
EACH MISCELLANEOUS
Qty: 6 EACH | Refills: 1 | Status: SHIPPED | OUTPATIENT
Start: 2022-02-21

## 2022-02-21 RX ORDER — BLOOD-GLUCOSE,RECEIVER,CONT
EACH MISCELLANEOUS
Qty: 1 EACH | Refills: 0 | Status: SHIPPED | OUTPATIENT
Start: 2022-02-21

## 2022-02-21 RX ORDER — BLOOD-GLUCOSE SENSOR
EACH MISCELLANEOUS
Qty: 6 EACH | Refills: 1 | Status: SHIPPED | OUTPATIENT
Start: 2022-02-21 | End: 2022-02-21 | Stop reason: SDUPTHER

## 2022-02-21 RX ORDER — BLOOD-GLUCOSE,RECEIVER,CONT
EACH MISCELLANEOUS
Qty: 1 EACH | Refills: 0 | Status: SHIPPED | OUTPATIENT
Start: 2022-02-21 | End: 2022-02-21 | Stop reason: SDUPTHER

## 2022-02-21 ASSESSMENT — PATIENT HEALTH QUESTIONNAIRE - PHQ9
2. FEELING DOWN, DEPRESSED OR HOPELESS: 0
SUM OF ALL RESPONSES TO PHQ9 QUESTIONS 1 & 2: 0
1. LITTLE INTEREST OR PLEASURE IN DOING THINGS: 0
SUM OF ALL RESPONSES TO PHQ QUESTIONS 1-9: 0

## 2022-02-21 ASSESSMENT — LIFESTYLE VARIABLES: HOW OFTEN DO YOU HAVE A DRINK CONTAINING ALCOHOL: NEVER

## 2022-02-21 NOTE — PROGRESS NOTES
C-Difficile admission screening and protocol:       * Admitted with diarrhea? [] YES    [x]  NO     *Prior history of C-Diff. In last 3 months? [] YES    [x]  NO     *Antibiotic use in the past 6-8 weeks? [x]  NO    []  YES                 If yes, which ANTIBIOTIC AND REASON______     *Prior hospitalization or nursing home in the last month? []  YES    [x]  NO      [address] time__0900__________        Surgery time___1030_________    Take the following medications with a sip of water: Follow your MD/Surgeons pre-procedure instructions regarding your medications    Do not eat or drink anything after 12:00 midnight prior to your surgery. This includes water chewing gum, mints and ice chips. You may brush your teeth and gargle the morning of your surgery, but do not swallow the water     Please see your family doctor/pediatrician for a history and physical and/or concerning medications. Bring any test results/reports from your physicians office. If you are under the care of a heart doctor or specialist doctor, please be aware that you may be asked to them for clearance    You may be asked to stop blood thinners such as Coumadin, Plavix, Fragmin, Lovenox, etc., or any anti-inflammatories such as:  Aspirin, Ibuprofen, Advil, Naproxen prior to your surgery. We also ask that you stop any OTC medications such as fish oil, vitamin E, glucosamine, garlic, Multivitamins, COQ 10, etc. MAY TAKE TYLENOL    We ask that you do not smoke 24 hours prior to surgery  We ask that you do not  drink any alcoholic beverages 24 hours prior to surgery     You must make arrangements for a responsible adult to take you home after your surgery. For your safety you will not be allowed to leave alone or drive yourself home. Your surgery will be cancelled if you do not have a ride home.      Also for your safety, it is strongly suggested that someone stay with you the first 24 hours after your surgery. A parent or legal guardian must accompany a child scheduled for surgery and plan to stay at the hospital until the child is discharged. Please do not bring other children with you. For your comfort, please wear simple loose fitting clothing to the hospital.  Please do not bring valuables. Do not wear any make-up or nail polish on your fingers or toes      For your safety, please do not wear any jewelry or body piercing's on the day of surgery. All jewelry must be removed. If you have dentures, they will be removed before going to operating room. For your convenience, we will provide you with a container. If you wear contact lenses or glasses, they will be removed, please bring a case for them. If you have a living will and a durable power of  for healthcare, please bring in a copy. As part of our patient safety program to minimize surgical site infections, we ask you to do the following:    · Please notify your surgeon if you develop any illness between         now and the  day of your surgery. · This includes a cough, cold, fever, sore throat, nausea,         or vomiting, and diarrhea, etc.  ·  Please notify your surgeon if you experience dizziness, shortness         of breath or blurred vision between now and the time of your surgery. Do not shave your operative site 96 hours prior to surgery. For face and neck surgery, men may use an electric razor 48 hours   prior to surgery. You may shower the night before surgery or the morning of   your surgery with an antibacterial soap.     You will need to bring a photo ID and insurance card    [facility] has an onsite pharmacy, would you like to utilize our pharmacy     If you will be staying overnight and use a C-pap machine, please bring   your C-pap to hospital     Our goal is to provide you with excellent care, therefore, visitors will be limited to two(2) in the room at a time so that we may focus on providing this care for you. Please contact pre-admission testing if you have any further questions. [facility] phone number:  [phone] PAT fax number:  [phone]  Please note these are generalized instructions for all surgical cases, you may be provided with more specific instructions according to your surgery. SAFETY FIRST. .call before you fall

## 2022-02-21 NOTE — PROGRESS NOTES
Medicare Annual Wellness Visit    Teresa Baez is here for Medicare [address] was seen today for medicare awv. Diagnoses and all orders for this visit:    Type 2 diabetes mellitus with stable proliferative retinopathy of right eye, with long-term current use of insulin (HCC)  -     Continuous Blood Gluc Transmit (DEXCOM G6 TRANSMITTER) MISC; Use 3-4 times daily to check glucose continually. -     Continuous Blood Gluc  ([address]) SHYANNE; Use 3-4 times daily to check glucose continually  -     Continuous Blood Gluc Sensor (DEXCOM G6 SENSOR) MISC; Use 3-4 times daily to check glucose continually    Body mass index (BMI) 50.0-59.9, adult (HCC)    Has positive Fit test and has upcoming screening colonoscopy       Recommendations for Preventive Services Due: see orders and patient instructions/AVS.  Recommended screening schedule for the next 5-10 years is provided to the patient in written form: see Patient Instructions/AVS.     Return in 1 year (on 2/21/2023) for Medicare Annual Wellness Visit in 1 year. Reviewed and updated this visit by clinical staff:  Tobacco  Allergies  Meds  Med Hx  Surg Hx  Soc Hx  Fam Hx      Subjective   Positive fit test and is scheduled for screening colonoscopy next week. Patient's complete Health Risk Assessment and screening values have been reviewed and are found in Flowsheets. The following problems were reviewed today and where indicated follow up appointments were made and/or referrals ordered.     Positive Risk Factor Screenings with Interventions:               General Health and ACP:  General  In general, how would you say your health is?: Good  In the past 7 days, have you experienced any of the following: New or Increased Pain, New or Increased Fatigue, Loneliness, Social Isolation, Stress or Anger?: (!) Yes  Select all that apply: (!) New or Increased Fatigue  Do you get the social and emotional support that you need?: Yes  Do you have a Living Will?: (!) No    Advance Directives     Power of [address] Will ACP-Advance Directive ACP-Power of     Not on File Not on File Not on File Not on File      General Health Risk Interventions:  · No Living Will: Advance Care Planning addressed with patient today    Health Habits/Nutrition:     Physical Activity: Inactive    Days of Exercise per Week: 0 days    Minutes of Exercise per Session: 0 min     Have you lost any weight without trying in the past 3 months?: No    Body mass index: (!) 53.48    Have you seen the dentist within the past year?: Yes      Health Habits/Nutrition Interventions:  · Inadequate physical activity:  patient is not ready to increase his/her physical activity level at this time     Safety:  Do you have working smoke detectors?: Yes  Do you have any tripping hazards - loose or unsecured carpets or rugs?: No  Do you have any tripping hazards - clutter in doorways, halls, or stairs?: No  Do you have either shower bars, grab bars, non-slip mats or non-slip surfaces in your shower or bathtub?: (!) No  Do all of your stairways have a railing or banister?: Yes  Do you always fasten your seatbelt when you are in a car?: Yes    Safety Interventions:  · Patient declines any further evaluation/treatment for this issue        Objective         General Appearance: alert and oriented to person, place and time, well developed and well- nourished, in no acute distress  Skin: warm and dry, no rash or erythema  Head: normocephalic and atraumatic  Eyes: pupils equal, round, and reactive to light, extraocular eye movements intact, conjunctivae normal  ENT: tympanic membrane, external ear and ear canal normal bilaterally, nose without deformity, nasal mucosa and turbinates normal without polyps  Neck: supple and non-tender without mass, no thyromegaly or thyroid nodules, no cervical lymphadenopathy  Pulmonary/Chest: clear to auscultation bilaterally- no wheezes, rales or rhonchi, normal air movement, no respiratory distress  Cardiovascular: normal rate, regular rhythm, normal S1 and S2, no murmurs, rubs, clicks, or gallops, distal pulses intact, no carotid bruits  Abdomen: soft, non-tender, non-distended, normal bowel sounds, no masses or organomegaly  Extremities: no cyanosis, clubbing or edema  Musculoskeletal: normal range of motion, no joint swelling, deformity or tenderness  Neurologic: reflexes normal and symmetric, no cranial nerve deficit, gait, coordination and speech normal      Allergies   Allergen Reactions    Cephalexin Itching     Can take Amoxil    Losartan Other (See Comments)     lightheaded     Prior to Visit Medications    Medication Sig Taking? Authorizing Provider   Continuous Blood Gluc Transmit (DEXCOM G6 TRANSMITTER) MISC Use 3-4 times daily to check glucose continually.  Yes Yonny Castillo MD   Continuous Blood Gluc  (DEXCOM G6 ) SHYANNE Use 3-4 times daily to check glucose continually Yes Yonny Castillo MD   Continuous Blood Gluc Sensor (DEXCOM G6 SENSOR) MISC Use 3-4 times daily to check glucose continually Yes Yonny Castillo MD   allopurinol (ZYLOPRIM) 300 MG tablet Take 1 tablet by mouth daily Yes Yonny Castillo MD   amLODIPine (NORVASC) 5 MG tablet Take 1 tablet by mouth daily Yes Yonny Castillo MD   metFORMIN (GLUCOPHAGE) 1000 MG tablet Take 1 tablet by mouth 2 times daily (with meals)  Patient taking differently: Take 1,000 mg by mouth daily (with breakfast) Takes once daily per pt  2/21/22 Yes Yonny Castillo MD   telmisartan (MICARDIS) 80 MG tablet Take 1 tablet by mouth daily Yes Yonny Castillo MD   triamterene-hydroCHLOROthiazide (MAXZIDE-25) 37.5-25 MG per tablet Take 1 tablet by mouth daily Yes Yonny Castillo MD   insulin NPH (HUMULIN N) 100 UNIT/ML injection vial INJECT SUBCUTANEOUSLY 54  UNITS AT BREAKFAST  Patient taking differently: Cyndie Mcduffie MD   potassium chloride (KLOR-CON M) 10 MEQ extended release tablet Take 1 tablet by mouth every other day Yes Rohan Hood MD   insulin regular (HUMULIN R) 100 UNIT/ML injection Inject 20 units at breakfast; 10 units at lunch; 20 units at dinner. Patient taking differently: Inject 20 units at breakfast; 20 units at dinner. Yes Rohan Hood MD   blood glucose test strips (ACCU-CHEK GUIDE) strip USE 1 STRIP TO CHECK GLUCOSE 4 TIMES DAILY  Rohan Hood MD   Insulin Syringe-Needle U-100 31G X 5/16\" 1 ML MISC Use with both BID Humulin N and TID Humulin R injections = 5 times daily.   Rohan Hood MD       CareTeam (Including outside providers/suppliers regularly involved in providing care):   Patient Care Team:  Rohan Hood MD as PCP - General (Internal Medicine)  Rohan Hood MD as PCP - REHABILITATION [facility] Empaneled Provider  Camille Burton MD as Consulting Physician (Ophthalmology)  Nuvia Navarro MD as Consulting Physician (Ophthalmology)

## 2022-02-21 NOTE — PATIENT INSTRUCTIONS
Personalized Preventive Plan for Esteban Baez - 2/21/2022  Medicare offers a range of preventive health benefits. Some of the tests and screenings are paid in full while other may be subject to a deductible, co-insurance, and/or copay. Some of these benefits include a comprehensive review of your medical history including lifestyle, illnesses that may run in your family, and various assessments and screenings as appropriate. After reviewing your medical record and screening and assessments performed today your provider may have ordered immunizations, labs, imaging, and/or referrals for you. A list of these orders (if applicable) as well as your Preventive Care list are included within your After Visit Summary for your review. Other Preventive Recommendations:    · A preventive eye exam performed by an eye specialist is recommended every 1-2 years to screen for glaucoma; cataracts, macular degeneration, and other eye disorders. · A preventive dental visit is recommended every 6 months. · Try to get at least 150 minutes of exercise per week or 10,000 steps per day on a pedometer . · Order or download the FREE \"Exercise & Physical Activity: Your Everyday Guide\" from [employer]. Call 5-486.530.8941 or search [employer] online. · You need 1616-1244 mg of calcium and 3429-5825 IU of vitamin D per day. It is possible to meet your calcium requirement with diet alone, but a vitamin D supplement is usually necessary to meet this goal.  · When exposed to the sun, use a sunscreen that protects against both UVA and UVB radiation with an SPF of 30 or greater. Reapply every 2 to 3 hours or after sweating, drying off with a towel, or swimming. · Always wear a seat belt when traveling in a car. Always wear a helmet when riding a bicycle or motorcycle.

## 2022-02-25 ENCOUNTER — ANESTHESIA EVENT (OUTPATIENT)
Dept: ENDOSCOPY | Age: 72
End: 2022-02-25
Payer: MEDICARE

## 2022-02-28 ENCOUNTER — ANESTHESIA (OUTPATIENT)
Dept: ENDOSCOPY | Age: 72
End: 2022-02-28
Payer: MEDICARE

## 2022-02-28 ENCOUNTER — HOSPITAL ENCOUNTER (OUTPATIENT)
Age: 72
Setting detail: OUTPATIENT SURGERY
Discharge: HOME | End: 2022-02-28
Attending: INTERNAL MEDICINE | Admitting: INTERNAL MEDICINE
Payer: MEDICARE

## 2022-02-28 VITALS — SYSTOLIC BLOOD PRESSURE: 95 MMHG | DIASTOLIC BLOOD PRESSURE: 46 MMHG | OXYGEN SATURATION: 100 %

## 2022-02-28 VITALS
SYSTOLIC BLOOD PRESSURE: 150 MMHG | HEART RATE: 80 BPM | HEIGHT: 65 IN | BODY MASS INDEX: 48.82 KG/M2 | TEMPERATURE: 98.1 F | DIASTOLIC BLOOD PRESSURE: 63 MMHG | WEIGHT: 293 LBS | OXYGEN SATURATION: 99 % | RESPIRATION RATE: 16 BRPM

## 2022-02-28 LAB
GLUCOSE BLD-MCNC: 196 MG/DL (ref 70–99)
GLUCOSE BLD-MCNC: 234 MG/DL (ref 70–99)
PERFORMED ON: ABNORMAL
PERFORMED ON: ABNORMAL

## 2022-02-28 PROCEDURE — 3609027000 HC COLONOSCOPY: Performed by: INTERNAL MEDICINE

## 2022-02-28 PROCEDURE — 3700000001 HC ADD 15 MINUTES (ANESTHESIA): Performed by: INTERNAL MEDICINE

## 2022-02-28 PROCEDURE — 3700000000 HC ANESTHESIA ATTENDED CARE: Performed by: INTERNAL MEDICINE

## 2022-02-28 PROCEDURE — 2500000003 HC RX 250 WO HCPCS: Performed by: NURSE ANESTHETIST, CERTIFIED REGISTERED

## 2022-02-28 PROCEDURE — 2580000003 HC RX 258: Performed by: ANESTHESIOLOGY

## 2022-02-28 PROCEDURE — 7100000011 HC PHASE II RECOVERY - ADDTL 15 MIN: Performed by: INTERNAL MEDICINE

## 2022-02-28 PROCEDURE — 6360000002 HC RX W HCPCS: Performed by: NURSE ANESTHETIST, CERTIFIED REGISTERED

## 2022-02-28 PROCEDURE — 7100000010 HC PHASE II RECOVERY - FIRST 15 MIN: Performed by: INTERNAL MEDICINE

## 2022-02-28 PROCEDURE — 6370000000 HC RX 637 (ALT 250 FOR IP): Performed by: INTERNAL MEDICINE

## 2022-02-28 PROCEDURE — 2709999900 HC NON-CHARGEABLE SUPPLY: Performed by: INTERNAL MEDICINE

## 2022-02-28 PROCEDURE — 7100000000 HC PACU RECOVERY - FIRST 15 MIN: Performed by: INTERNAL MEDICINE

## 2022-02-28 PROCEDURE — 7100000001 HC PACU RECOVERY - ADDTL 15 MIN: Performed by: INTERNAL MEDICINE

## 2022-02-28 RX ORDER — LIDOCAINE HYDROCHLORIDE 20 MG/ML
INJECTION, SOLUTION EPIDURAL; INFILTRATION; INTRACAUDAL; PERINEURAL PRN
Status: DISCONTINUED | OUTPATIENT
Start: 2022-02-28 | End: 2022-02-28 | Stop reason: SDUPTHER

## 2022-02-28 RX ORDER — SODIUM CHLORIDE 9 MG/ML
INJECTION, SOLUTION INTRAVENOUS CONTINUOUS
Status: DISCONTINUED | OUTPATIENT
Start: 2022-02-28 | End: 2022-02-28 | Stop reason: HOSPADM

## 2022-02-28 RX ORDER — ONDANSETRON 2 MG/ML
4 INJECTION INTRAMUSCULAR; INTRAVENOUS
Status: DISCONTINUED | OUTPATIENT
Start: 2022-02-28 | End: 2022-02-28 | Stop reason: HOSPADM

## 2022-02-28 RX ORDER — SODIUM CHLORIDE 0.9 % (FLUSH) 0.9 %
5-40 SYRINGE (ML) INJECTION PRN
Status: DISCONTINUED | OUTPATIENT
Start: 2022-02-28 | End: 2022-02-28 | Stop reason: HOSPADM

## 2022-02-28 RX ORDER — DIPHENHYDRAMINE HYDROCHLORIDE 50 MG/ML
12.5 INJECTION INTRAMUSCULAR; INTRAVENOUS
Status: DISCONTINUED | OUTPATIENT
Start: 2022-02-28 | End: 2022-02-28 | Stop reason: HOSPADM

## 2022-02-28 RX ORDER — SODIUM CHLORIDE 9 MG/ML
25 INJECTION, SOLUTION INTRAVENOUS PRN
Status: DISCONTINUED | OUTPATIENT
Start: 2022-02-28 | End: 2022-02-28 | Stop reason: HOSPADM

## 2022-02-28 RX ORDER — MEPERIDINE HYDROCHLORIDE 25 MG/ML
12.5 INJECTION INTRAMUSCULAR; INTRAVENOUS; SUBCUTANEOUS EVERY 5 MIN PRN
Status: DISCONTINUED | OUTPATIENT
Start: 2022-02-28 | End: 2022-02-28 | Stop reason: HOSPADM

## 2022-02-28 RX ORDER — SIMETHICONE 20 MG/.3ML
EMULSION ORAL PRN
Status: DISCONTINUED | OUTPATIENT
Start: 2022-02-28 | End: 2022-02-28 | Stop reason: ALTCHOICE

## 2022-02-28 RX ORDER — PROPOFOL 10 MG/ML
INJECTION, EMULSION INTRAVENOUS PRN
Status: DISCONTINUED | OUTPATIENT
Start: 2022-02-28 | End: 2022-02-28 | Stop reason: SDUPTHER

## 2022-02-28 RX ORDER — SODIUM CHLORIDE 0.9 % (FLUSH) 0.9 %
5-40 SYRINGE (ML) INJECTION EVERY 12 HOURS SCHEDULED
Status: DISCONTINUED | OUTPATIENT
Start: 2022-02-28 | End: 2022-02-28 | Stop reason: HOSPADM

## 2022-02-28 RX ADMIN — PROPOFOL 80 MG: 10 INJECTION, EMULSION INTRAVENOUS at 10:31

## 2022-02-28 RX ADMIN — SODIUM CHLORIDE: 9 INJECTION, SOLUTION INTRAVENOUS at 10:25

## 2022-02-28 RX ADMIN — PROPOFOL 160 MCG/KG/MIN: 10 INJECTION, EMULSION INTRAVENOUS at 10:33

## 2022-02-28 RX ADMIN — LIDOCAINE HYDROCHLORIDE 60 MG: 20 INJECTION, SOLUTION EPIDURAL; INFILTRATION; INTRACAUDAL; PERINEURAL at 10:31

## 2022-02-28 ASSESSMENT — PULMONARY FUNCTION TESTS
PIF_VALUE: 1
PIF_VALUE: 0
PIF_VALUE: 1
PIF_VALUE: 0
PIF_VALUE: 1
PIF_VALUE: 0
PIF_VALUE: 1
PIF_VALUE: 0
PIF_VALUE: 1
PIF_VALUE: 0
PIF_VALUE: 1
PIF_VALUE: 0
PIF_VALUE: 1
PIF_VALUE: 0
PIF_VALUE: 0
PIF_VALUE: 1
PIF_VALUE: 0
PIF_VALUE: 1
PIF_VALUE: 0
PIF_VALUE: 0
PIF_VALUE: 1
PIF_VALUE: 1
PIF_VALUE: 0
PIF_VALUE: 0
PIF_VALUE: 1
PIF_VALUE: 0
PIF_VALUE: 1
PIF_VALUE: 0

## 2022-02-28 ASSESSMENT — PAIN SCALES - GENERAL
PAINLEVEL_OUTOF10: 0

## 2022-02-28 ASSESSMENT — LIFESTYLE VARIABLES: SMOKING_STATUS: 0

## 2022-02-28 ASSESSMENT — PAIN - FUNCTIONAL ASSESSMENT: PAIN_FUNCTIONAL_ASSESSMENT: 0-10

## 2022-02-28 NOTE — ANESTHESIA POSTPROCEDURE EVALUATION
Department of Anesthesiology  Postprocedure Note    Patient: Devi Baez  MRN: [id number]  YOB: 1950  Date of evaluation: 2/28/2022  Time:  12:24 PM     Procedure Summary     Date: 02/28/22 Room / Location: [address] [place] [address]    Anesthesia Start: 1027 Anesthesia Stop: 1104    Procedure: COLONOSCOPY (N/A ) Diagnosis:       Colon cancer screening      (COLON CANCER SCREENING)    Surgeons: Ashely Adair MD Responsible Provider: Bharati Madden MD    Anesthesia Type: MAC ASA Status: 3          Anesthesia Type: MAC    Swapnil Phase I: Swapnil Score: 10    Swapnil Phase II: Swapnil Score: 10    Last vitals: Reviewed and per EMR flowsheets.        Anesthesia Post Evaluation    Patient location during evaluation: bedside  Patient participation: complete - patient participated  Level of consciousness: awake and alert  Pain score: 0  Nausea & Vomiting: no nausea  Complications: no  Cardiovascular status: hemodynamically stable  Respiratory status: acceptable  Hydration status: stable

## 2022-02-28 NOTE — ANESTHESIA PRE PROCEDURE
Department of Anesthesiology  Preprocedure Note       Name:  Monae Melgar   Age:  73 y.o.  :  1950                                          MRN:  [id number]         Date:  2022      Surgeon: Johnnie Brooke):  Gwen Muniz MD    Procedure: Procedure(s):  COLONOSCOPY    Medications prior to admission:   Prior to Admission medications    Medication Sig Start Date End Date Taking? Authorizing Provider   allopurinol (ZYLOPRIM) 300 MG tablet Take 1 tablet by mouth daily 2/10/22  Yes Merna Hartman MD   amLODIPine (NORVASC) 5 MG tablet Take 1 tablet by mouth daily 21  Yes Merna Hartman MD   metFORMIN (GLUCOPHAGE) 1000 MG tablet Take 1 tablet by mouth 2 times daily (with meals)  Patient taking differently: Take 1,000 mg by mouth daily (with breakfast) Takes once daily per pt  22  Yes Merna Hartman MD   telmisartan (MICARDIS) 80 MG tablet Take 1 tablet by mouth daily 21  Yes Merna Hartman MD   triamterene-hydroCHLOROthiazide (MAXZIDE-25) 37.5-25 MG per tablet Take 1 tablet by mouth daily 10/28/21  Yes Merna Hartman MD   insulin NPH (HUMULIN N) 100 UNIT/ML injection vial INJECT SUBCUTANEOUSLY 54  UNITS AT BREAKFAST  Patient taking differently: [address] [place] [address] 10/14/21  Yes Merna Hartman MD   potassium chloride (KLOR-CON M) 10 MEQ extended release tablet Take 1 tablet by mouth every other day 8/10/21  Yes Merna Hartman MD   insulin regular (HUMULIN R) 100 UNIT/ML injection Inject 20 units at breakfast; 10 units at lunch; 20 units at dinner. Patient taking differently: Inject 20 units at breakfast; 20 units at dinner. 20  Yes Merna Hartman MD   Continuous Blood Gluc Transmit (DEXCOM G6 TRANSMITTER) MISC Use 3-4 times daily to check glucose continually.  22   Merna Hartman MD   Continuous Blood Gluc  (DEXCOM G6 ) SHYANNE Use 3-4 times daily to check glucose continually 22   Merna Hartman MD Continuous Blood Gluc Sensor (DEXCOM G6 SENSOR) MISC Use 3-4 times daily to check glucose continually 2/21/22   Sánchez Garcia MD   blood glucose test strips (ACCU-CHEK GUIDE) strip USE 1 STRIP TO CHECK GLUCOSE 4 TIMES DAILY 1/18/22   Sánchez Garcia MD   Insulin Syringe-Needle U-100 31G X 5/16\" 1 ML MISC Use with both BID Humulin N and TID Humulin R injections = 5 times daily. 2/24/20   Sánchez Garcia MD       Current medications:    Current Facility-Administered Medications   Medication Dose Route Frequency Provider Last Rate Last Admin    0.9 % sodium chloride infusion   IntraVENous Continuous Modesta Espitia MD        sodium chloride flush 0.9 % injection 5-40 mL  5-40 mL IntraVENous 2 times per day Modesta Espitia MD        sodium chloride flush 0.9 % injection 5-40 mL  5-40 mL IntraVENous PRN Modesta Espitia MD        0.9 % sodium chloride infusion  25 mL IntraVENous PRN Modesta Espitia MD           Allergies:     Allergies   Allergen Reactions    Cephalexin Itching     Can take Amoxil    Losartan Other (See Comments)     lightheaded       Problem List:    Patient Active Problem List   Diagnosis Code    Type 2 diabetes mellitus with stable proliferative retinopathy of right eye, with long-term current use of insulin ([address] R12.3480, Z79.4    Essential hypertension I10    Post-menopausal Z78.0    Morbid obesity due to excess calories ([address] E66.01    Trigger ring finger of left hand M65.342    OAB (overactive bladder) N32.81    Left wrist pain M25.532    Proliferative diabetic retinopathy, right eye ([address] B17.3153    Venous insufficiency of both lower extremities I87.2    Primary osteoarthritis of left knee M17.12       Past Medical History:        Diagnosis Date    Cataract     Diabetes     Diabetic retinopathy ([address]     High blood pressure     Proliferative diabetic retinopathy, right eye ([address]        Past Surgical History:        Procedure Laterality Date    CARPAL TUNNEL RELEASE  ,    Bilateral    CATARACT REMOVAL WITH IMPLANT      MARY EYES    COLONOSCOPY      FINGER TRIGGER RELEASE      FINGER TRIGGER RELEASE      FINGER TRIGGER RELEASE Left 2018    left ring finger    HAND SURGERY  ,     Left x5    HYSTERECTOMY      SHOULDER SURGERY  2005    Right       Social History:    Social History     Tobacco Use    Smoking status: Former Smoker     Packs/day: 0.50     Years: 20.00     Pack years: 10.00     Types: Cigarettes     Quit date: 3/1/1996     Years since quittin.0    Smokeless tobacco: Never Used   Substance Use Topics    Alcohol use: No                                Counseling given: Not Answered      Vital Signs (Current):   Vitals:    22 1542   Weight: (!) 321 lb (145.6 kg)   Height: 5' 5\" (1.651 m)                                              BP Readings from Last 3 Encounters:   22 (!) 115/58   21 (!) 115/53   08/10/21 (!) 143/62       NPO Status:                                                                                 BMI:   Wt Readings from Last 3 Encounters:   22 (!) 321 lb (145.6 kg)   22 (!) 321 lb 6.4 oz (145.8 kg)   21 (!) 322 lb (146.1 kg)     Body mass index is 53.42 kg/m².     CBC:   Lab Results   Component Value Date    WBC 5.7 2021    RBC 4.01 2021    HGB 11.9 2021    HCT 35.4 2021    MCV 88.2 2021    RDW 14.6 2021     2021       CMP:   Lab Results   Component Value Date     2021    K 4.9 2021     2021    CO2 27 2021    BUN 25 2021    CREATININE 1.0 2021    GFRAA >60 2021    AGRATIO 1.5 2021    LABGLOM 55 2021    GLUCOSE 221 2018    PROT 6.9 2021    CALCIUM 9.1 2021    BILITOT 0.4 2021    ALKPHOS 61 2021    AST 13 2021    ALT 11 2021       POC Tests: No results for input(s): POCGLU, POCNA, POCK, POCCL, POCBUN, Mj Larkin in the last 72 hours. Coags:   Lab Results   Component Value Date    PROTIME 10.1 06/07/2015    INR 0.94 06/07/2015    APTT 29.0 06/07/2015       HCG (If Applicable): No results found for: PREGTESTUR, PREGSERUM, HCG, HCGQUANT     ABGs: No results found for: PHART, PO2ART, IAR1VQI, EJX0UOG, BEART, A4UETXDF     Type & Screen (If Applicable):  No results found for: LABABO, LABRH    Drug/Infectious Status (If Applicable):  No results found for: HIV, HEPCAB    COVID-19 Screening (If Applicable): No results found for: COVID19        Anesthesia Evaluation  Patient summary reviewed no history of anesthetic complications:   Airway: Mallampati: II  TM distance: >3 FB   Neck ROM: full  Mouth opening: > = 3 FB Dental: normal exam         Pulmonary:Negative Pulmonary ROS       (-) COPD, asthma and not a current smoker                           Cardiovascular:    (+) hypertension:,     (-) past MI, CABG/stent and  angina       Beta Blocker:  Not on Beta Blocker      ROS comment: ECHO 2019:     Summary   Normal left ventricle size, wall and systolic function with an estimated   ejection fraction of 60-65%. No regional wall motion abnormalities are seen. There is mild concentric left ventricular hypertrophy. Aortic valve leaflets appear sclerotic. Neuro/Psych:   Negative Neuro/Psych ROS     (-) seizures and CVA           GI/Hepatic/Renal:        (-) liver disease and no renal disease       Endo/Other:    (+) Diabetesusing insulin, . Abdominal:   (+) obese,           Vascular: negative vascular ROS. Other Findings:           Anesthesia Plan      MAC     ASA 3       Induction: intravenous. Anesthetic plan and risks discussed with patient. Plan discussed with CRNA. This pre-anesthesia assessment may be used as a history and physical.    DOS STAFF ADDENDUM:    Pt seen and examined, chart reviewed (including anesthesia, drug and allergy history).   No interval changes to history and physical examination. Anesthetic plan, risks, benefits, alternatives, and personnel involved discussed with patient. Patient verbalized an understanding and agrees to proceed.       Jourdan Perdomo MD  February 28, 2022  9:48 AM

## 2022-02-28 NOTE — H&P
Pre-operative History and Physical    Patient: Esteban Baez  : 1950  Acct#:     Intended Procedure:  Colonoscopy     HISTORY OF PRESENT ILLNESS:  The patient is a 73 y.o. female  who presents for/due to Occult Positive Stools     Past Medical History:        Diagnosis Date    Cataract     Diabetes     Diabetic retinopathy ([address]     High blood pressure     Proliferative diabetic retinopathy, right eye ([address]      Past Surgical History:        Procedure Laterality Date    CARPAL TUNNEL RELEASE  ,    Bilateral    CATARACT REMOVAL WITH IMPLANT      MARY EYES    COLONOSCOPY      FINGER TRIGGER RELEASE      FINGER TRIGGER RELEASE      FINGER TRIGGER RELEASE Left 2018    left ring finger    HAND SURGERY  ,     Left x5    HYSTERECTOMY      SHOULDER SURGERY      Right     Medications Prior to Admission:   Prior to Admission medications    Medication Sig Start Date End Date Taking?  Authorizing Provider   allopurinol (ZYLOPRIM) 300 MG tablet Take 1 tablet by mouth daily 2/10/22  Yes Telma Grier MD   amLODIPine (NORVASC) 5 MG tablet Take 1 tablet by mouth daily 21  Yes Telma Grier MD   metFORMIN (GLUCOPHAGE) 1000 MG tablet Take 1 tablet by mouth 2 times daily (with meals)  Patient taking differently: Take 1,000 mg by mouth daily (with breakfast) Takes once daily per pt  22  Yes Telma Grier MD   telmisartan (MICARDIS) 80 MG tablet Take 1 tablet by mouth daily 21  Yes Telma Grier MD   triamterene-hydroCHLOROthiazide (MAXZIDE-25) 37.5-25 MG per tablet Take 1 tablet by mouth daily 10/28/21  Yes Telma Grier MD   insulin NPH (HUMULIN N) 100 UNIT/ML injection vial INJECT SUBCUTANEOUSLY 54  UNITS AT BREAKFAST  Patient taking differently: [address] [place] [address] 10/14/21  Yes Telma Grier MD   potassium chloride (KLOR-CON M) 10 MEQ extended release tablet Take 1 tablet by mouth every other day 8/10/21  Yes Jazmyn Perez MD   insulin regular (HUMULIN R) 100 UNIT/ML injection Inject 20 units at breakfast; 10 units at lunch; 20 units at dinner. Patient taking differently: Inject 20 units at breakfast; 20 units at dinner. 7/13/20  Yes Jazmyn Perez MD   Continuous Blood Gluc Transmit (DEXCOM G6 TRANSMITTER) MISC Use 3-4 times daily to check glucose continually. 2/21/22   Jazmyn Perez MD   Continuous Blood Gluc  (DEXCOM G6 ) SHYANNE Use 3-4 times daily to check glucose continually 2/21/22   Jazmyn Perez MD   Continuous Blood Gluc Sensor (DEXCOM G6 SENSOR) MISC Use 3-4 times daily to check glucose continually 2/21/22   Jazmyn Perez MD   blood glucose test strips (ACCU-CHEK GUIDE) strip USE 1 STRIP TO CHECK GLUCOSE 4 TIMES DAILY 1/18/22   Jazmyn Perez MD   Insulin Syringe-Needle U-100 31G X 5/16\" 1 ML MISC Use with both BID Humulin N and TID Humulin R injections = 5 times daily. 2/24/20   Jazmyn Perez MD       Allergies:  Cephalexin and Losartan    Social History:   TOBACCO:   reports that she quit smoking about 26 years ago. Her smoking use included cigarettes. She has a 10.00 pack-year smoking history. She has never used smokeless tobacco.  ETOH:   reports no history of alcohol use. DRUGS:   reports no history of drug use. PHYSICAL EXAM:      Vital Signs: Ht 5' 5\" (1.651 m)   Wt (!) 321 lb (145.6 kg)   BMI 53.42 kg/m²    Airway: No stridor or wheezing noted. Good air movement  Pulmonary: without wheezes.   Clear to auscultation  Cardiac:regular rate and rhythm without loud murmurs  Abdomen:soft, nontender,  Bowel sounds present    Pre-Procedure Assessment / Plan:  1) Colonoscopy    ASA Grade:  ASA 3 - Patient with moderate systemic disease with functional limitations  Mallampati Classification:  Class II    Level of Sedation Plan:Deep sedation    Post Procedure plan: Return to same level of care    I assessed the patient and find that the patient is in satisfactory condition to proceed with the planned procedure and sedation plan. I have explained the risk, benefits, and alternatives to the procedure; the patient understands and agrees to proceed.        Fawn Nava MD  2/28/2022

## 2022-02-28 NOTE — OP NOTE
Colonoscopy Procedure Note      Patient: Michelle Baez  : 1950  Acct#:     Procedure: Colonoscopy    Date:  2022    Surgeon:  Melissa Reynaga MD    Referring Physician:  Alaina Hernandez MD    Previous Colonoscopy: YES  Date:   Greater than 3 years: YES    Preoperative Diagnosis:  1. Occult Positive Stools      Postoperative Diagnosis:  1. Tortious Sigmoid colon 2. Internal hemorrhoids     Consent:  The patient or their legal guardian has signed a consent, and is aware of the potential risks, benefits, alternatives, and potential complications of this procedure. These include, but are not limited to hemorrhage, bleeding, post procedural pain, perforation, phlebitis, aspiration, hypotension, hypoxia, cardiovascular events such as arryhthmia, and possibly death. Additionally, the possibility of missed colonic polyps and interval colon cancer was discussed in the consent. Anesthesia:  The patient was administered IV propofol per anesthesiology team.  Please see their operative records for full details. Procedure: An informed consent was obtained from the patient after explanation of indications, benefits, possible risks and complications of the procedure. The patient was then taken to the endoscopy suite, placed in the left lateral decubitus position, and the above IV anesthesia was administered. A digital rectal examination was performed and revealed negative without mass, lesions or tenderness. The Olympus video colonoscope was placed in the patient's rectum under digital direction and advanced to the cecum. The cecum was identified by characteristic anatomy and ballottment. The preparation was excellent. The ileocecal valve was identified. The scope was then withdrawn back through the cecum, ascending, transverse, descending, sigmoid colon, and rectum. Careful circumferential examination of the mucosa in these areas demonstrated:    1.  The sigmoid colon was tortious. The colon was otherwise normal with no polyps, masses, inflammation, or other significant abnormalities. The scope was then withdrawn into the rectum and retroflexed. The retroflexed view of the anal verge and rectum demonstrates internal hemorrhoids. The scope was straightened, the colon was decompressed and the scope was withdrawn from the patient. The patient tolerated the procedure well and was taken to the PACU in good condition. Estimated Blood Loss (mL): None    Complications: None    Specimens: * No specimens in log *    Impression:  See post-procedure diagnoses. Recommendations:   1. Positive suspected to be secondary to [facility]. Recommend repeat colonoscopy in 5 years for history of polyps.      AMILCAR Martínez [address] and Kacy Matta [address]  2/28/2022  [phone]

## 2022-02-28 NOTE — PROGRESS NOTES
Pt awake on arrival to phase II. Denies pain. Refuses PO but denies nausea. Given call light. Called for friend.

## 2022-02-28 NOTE — PROGRESS NOTES
Pt alert. Denies pain. VSS. Up to chair. Discharge instructions given to pt and friend. Both express an understanding of instructions. Pt dressed with friend's assistance. Wheeled pt to friend's car for discharge.

## 2022-03-02 ENCOUNTER — TELEPHONE (OUTPATIENT)
Dept: PRIMARY CARE CLINIC | Age: 72
End: 2022-03-02

## 2022-03-02 NOTE — TELEPHONE ENCOUNTER
Faxed recent chart notes for date of service 2/21/22 to [facility] [phone]

## 2022-05-02 DIAGNOSIS — I10 ESSENTIAL HYPERTENSION: ICD-10-CM

## 2022-05-03 RX ORDER — TRIAMTERENE AND HYDROCHLOROTHIAZIDE 37.5; 25 MG/1; MG/1
1 TABLET ORAL DAILY
Qty: 90 TABLET | Refills: 1 | Status: SHIPPED | OUTPATIENT
Start: 2022-05-03 | End: 2022-10-20

## 2022-05-10 RX ORDER — POTASSIUM CHLORIDE 750 MG/1
10 TABLET, EXTENDED RELEASE ORAL EVERY OTHER DAY
Qty: 45 TABLET | Refills: 1 | Status: SHIPPED | OUTPATIENT
Start: 2022-05-10 | End: 2022-09-12

## 2022-05-16 RX ORDER — TELMISARTAN 80 MG/1
80 TABLET ORAL DAILY
Qty: 90 TABLET | Refills: 3 | Status: SHIPPED | OUTPATIENT
Start: 2022-05-16

## 2022-05-18 VITALS — SYSTOLIC BLOOD PRESSURE: 122 MMHG | DIASTOLIC BLOOD PRESSURE: 60 MMHG

## 2022-05-24 ENCOUNTER — OFFICE VISIT (OUTPATIENT)
Dept: PRIMARY CARE CLINIC | Age: 72
End: 2022-05-24
Payer: MEDICARE

## 2022-05-24 VITALS
OXYGEN SATURATION: 98 % | BODY MASS INDEX: 53.48 KG/M2 | TEMPERATURE: 98 F | HEART RATE: 86 BPM | SYSTOLIC BLOOD PRESSURE: 105 MMHG | WEIGHT: 293 LBS | RESPIRATION RATE: 18 BRPM | DIASTOLIC BLOOD PRESSURE: 50 MMHG

## 2022-05-24 DIAGNOSIS — E11.3551 TYPE 2 DIABETES MELLITUS WITH STABLE PROLIFERATIVE RETINOPATHY OF RIGHT EYE, WITH LONG-TERM CURRENT USE OF INSULIN (HCC): Primary | ICD-10-CM

## 2022-05-24 DIAGNOSIS — E66.01 MORBID OBESITY DUE TO EXCESS CALORIES (HCC): ICD-10-CM

## 2022-05-24 DIAGNOSIS — I10 ESSENTIAL HYPERTENSION: ICD-10-CM

## 2022-05-24 DIAGNOSIS — E79.0 HYPERURICEMIA: ICD-10-CM

## 2022-05-24 DIAGNOSIS — Z79.4 TYPE 2 DIABETES MELLITUS WITH STABLE PROLIFERATIVE RETINOPATHY OF RIGHT EYE, WITH LONG-TERM CURRENT USE OF INSULIN (HCC): Primary | ICD-10-CM

## 2022-05-24 DIAGNOSIS — J30.2 SEASONAL ALLERGIES: ICD-10-CM

## 2022-05-24 LAB — HBA1C MFR BLD: 7.1 %

## 2022-05-24 PROCEDURE — 2022F DILAT RTA XM EVC RTNOPTHY: CPT | Performed by: FAMILY MEDICINE

## 2022-05-24 PROCEDURE — 83036 HEMOGLOBIN GLYCOSYLATED A1C: CPT | Performed by: FAMILY MEDICINE

## 2022-05-24 PROCEDURE — 3051F HG A1C>EQUAL 7.0%<8.0%: CPT | Performed by: FAMILY MEDICINE

## 2022-05-24 PROCEDURE — 1123F ACP DISCUSS/DSCN MKR DOCD: CPT | Performed by: FAMILY MEDICINE

## 2022-05-24 PROCEDURE — 1090F PRES/ABSN URINE INCON ASSESS: CPT | Performed by: FAMILY MEDICINE

## 2022-05-24 PROCEDURE — G8427 DOCREV CUR MEDS BY ELIG CLIN: HCPCS | Performed by: FAMILY MEDICINE

## 2022-05-24 PROCEDURE — 99214 OFFICE O/P EST MOD 30 MIN: CPT | Performed by: FAMILY MEDICINE

## 2022-05-24 PROCEDURE — 1036F TOBACCO NON-USER: CPT | Performed by: FAMILY MEDICINE

## 2022-05-24 PROCEDURE — G8399 PT W/DXA RESULTS DOCUMENT: HCPCS | Performed by: FAMILY MEDICINE

## 2022-05-24 PROCEDURE — G8417 CALC BMI ABV UP PARAM F/U: HCPCS | Performed by: FAMILY MEDICINE

## 2022-05-24 PROCEDURE — 3017F COLORECTAL CA SCREEN DOC REV: CPT | Performed by: FAMILY MEDICINE

## 2022-05-24 SDOH — ECONOMIC STABILITY: FOOD INSECURITY: WITHIN THE PAST 12 MONTHS, YOU WORRIED THAT YOUR FOOD WOULD RUN OUT BEFORE YOU GOT MONEY TO BUY MORE.: NEVER TRUE

## 2022-05-24 SDOH — ECONOMIC STABILITY: FOOD INSECURITY: WITHIN THE PAST 12 MONTHS, THE FOOD YOU BOUGHT JUST DIDN'T LAST AND YOU DIDN'T HAVE MONEY TO GET MORE.: NEVER TRUE

## 2022-05-24 ASSESSMENT — ENCOUNTER SYMPTOMS
DIARRHEA: 0
SHORTNESS OF BREATH: 0
ABDOMINAL PAIN: 0
RHINORRHEA: 1
CONSTIPATION: 0
BLOOD IN STOOL: 0

## 2022-05-24 ASSESSMENT — SOCIAL DETERMINANTS OF HEALTH (SDOH): HOW HARD IS IT FOR YOU TO PAY FOR THE VERY BASICS LIKE FOOD, HOUSING, MEDICAL CARE, AND HEATING?: NOT VERY HARD

## 2022-05-24 NOTE — PROGRESS NOTES
2022     Dania Baez (:  1950) is a 73 y.o. female, here for evaluation of the following medical concerns:    HPI  Presented to the office for regular follow-up. She has diabetes controlled with current medication. HbA1c today 7.1%. She takes metformin 1000 mg daily, was on 1000 mg twice daily but she is concerned about low blood sugar at night, has rare episode of blood sugar dropping down to low 60s. She also takes Humulin N 54 units  daily and Humulin R 20 units at breakfast and at lunch and 20 units at supper. She has hypertension controlled with amlodipine 5 mg daily, Micardis 80 mg daily and Maxide 1 tablet daily. She has hyperuricemia controlled with allopurinol 300 mg daily she denies recent flareup of gout. She has an allergy and requested referral to allergist.  She is morbidly obese and struggling to lose weight She denies chest pain or shortness of breath denies bowel or urinary disturbance. Review of Systems   Constitutional: Negative for activity change and appetite change. HENT: Positive for congestion and rhinorrhea. Eyes: Negative for visual disturbance. Respiratory: Negative for shortness of breath. Cardiovascular: Negative for chest pain and leg swelling. Gastrointestinal: Negative for abdominal pain, blood in stool, constipation and diarrhea. Genitourinary: Negative for difficulty urinating, frequency, hematuria, menstrual problem and urgency. Neurological: Negative for dizziness and syncope. Psychiatric/Behavioral: Negative for behavioral problems. Prior to Visit Medications    Medication Sig Taking?  Authorizing Provider   telmisartan (MICARDIS) 80 MG tablet TAKE 1 TABLET BY MOUTH  DAILY Yes General Enedina MD   potassium chloride (KLOR-CON M) 10 MEQ extended release tablet Take 1 tablet by mouth every other day Yes General Enedina MD   triamterene-hydroCHLOROthiazide (MAXZIDE-25) 37.5-25 MG per tablet Take 1 tablet by mouth daily Yes Ismael Hudson MD   Continuous Blood Gluc Transmit (DEXCOM G6 TRANSMITTER) MISC Use 3-4 times daily to check glucose continually. Yes Ismael Hudson MD   Continuous Blood Gluc  (DEXCOM G6 ) SHYANNE Use 3-4 times daily to check glucose continually Yes Ismael Hudson MD   Continuous Blood Gluc Sensor (DEXCOM G6 SENSOR) MISC Use 3-4 times daily to check glucose continually Yes Ismael Hudson MD   allopurinol (ZYLOPRIM) 300 MG tablet Take 1 tablet by mouth daily Yes Ismael Hudson MD   blood glucose test strips (ACCU-CHEK GUIDE) strip USE 1 STRIP TO CHECK GLUCOSE 4 TIMES DAILY Yes Ismael Hudson MD   amLODIPine (NORVASC) 5 MG tablet Take 1 tablet by mouth daily Yes Ismael Hudson MD   metFORMIN (GLUCOPHAGE) 1000 MG tablet Take 1 tablet by mouth 2 times daily (with meals)  Patient taking differently: Take 1,000 mg by mouth daily (with breakfast) Takes once daily per pt  22 Yes Ismael Hudson MD   insulin NPH (HUMULIN N) 100 UNIT/ML injection vial INJECT SUBCUTANEOUSLY 54  UNITS AT BREAKFAST  Patient taking differently: INJECT SUBCUTANEOUSLY [facility], MD   insulin regular (HUMULIN R) 100 UNIT/ML injection Inject 20 units at breakfast; 10 units at lunch; 20 units at dinner. Patient taking differently: Inject 20 units at breakfast; 20 units at dinner. Yes Ismael Hudson MD   Insulin Syringe-Needle U-100 31G X 5\" 1 ML MISC Use with both BID Humulin N and TID Humulin R injections = 5 times daily.  Yes Ismael Hudson MD        Social History     Tobacco Use    Smoking status: Former Smoker     Packs/day: 0.50     Years: 20.00     Pack years: 10.00     Types: Cigarettes     Quit date: 3/1/1996     Years since quittin.2    Smokeless tobacco: Never Used   Substance Use Topics    Alcohol use: No        Vitals:    22 1026   BP: (!) 105/50   Pulse: 86   Resp: 18   Temp: 98 °F (36.7 °C)   TempSrc: Temporal   SpO2: 98%   Weight: (!) 321 lb 6.4 oz (145.8 kg)     Estimated body mass index is 53.48 kg/m² as calculated from the following:    Height as of 2/28/22: 5' 5\" (1.651 m). Weight as of this encounter: 321 lb 6.4 oz (145.8 kg). Physical Exam  Constitutional:       Appearance: She is well-developed. HENT:      Head: Normocephalic. Right Ear: External ear normal.      Nose: Nose normal.   Eyes:      Conjunctiva/sclera: Conjunctivae normal.      Pupils: Pupils are equal, round, and reactive to light. Neck:      Thyroid: No thyromegaly. Cardiovascular:      Rate and Rhythm: Normal rate and regular rhythm. Heart sounds: Normal heart sounds. No murmur heard. No friction rub. Pulmonary:      Effort: Pulmonary effort is normal.      Breath sounds: Normal breath sounds. Abdominal:      General: Bowel sounds are normal.      Palpations: Abdomen is soft. There is no mass. Musculoskeletal:         General: Normal range of motion. Cervical back: Normal range of motion and neck supple. Lymphadenopathy:      Cervical: No cervical adenopathy. Skin:     General: Skin is warm. Findings: No rash. Neurological:      Mental Status: She is alert and oriented to person, place, and time. ASSESSMENT/PLAN:  1. Type 2 diabetes mellitus with stable proliferative retinopathy of right eye, with long-term current use of insulin ([facility])  HbA1c today is 7.1%. Patient does not want to change current medication. She takes metformin 1000 mg only once a day, Humulin N 54  units at breakfast and Humulin R 20 units at breakfast 10 at lunch and 20 units at supper. She is concerned about low blood sugar at night which sometimes goes down to 60's  - POCT glycosylated hemoglobin (Hb A1C)    2. Essential hypertension  Controlled. Continue Micardis 80 mg daily, Maxide 25 daily and amlodipine 5 mg daily    3. Seasonal allergies  She requested referral to allergist  - Ray Horn MD, Allergy & Immunology, [facility]    4. Morbid obesity due to excess calories ([address]  Encouraged to lose weight with diet and exercise    5. Hyperuricemia  Controlled. Continue allopurinol 300 mg daily.   Will check uric acid level next blood draw      RTC in 6 mos    An electronic signature was used to authenticate this note.    --Debbie Alvarez MD on 5/24/2022 at 12:51 PM

## 2022-07-18 RX ORDER — AMLODIPINE BESYLATE 5 MG/1
5 TABLET ORAL DAILY
Qty: 90 TABLET | Refills: 1 | Status: SHIPPED | OUTPATIENT
Start: 2022-07-18

## 2022-08-23 ENCOUNTER — TELEPHONE (OUTPATIENT)
Dept: PHARMACY | Facility: CLINIC | Age: 72
End: 2022-08-23

## 2022-08-23 NOTE — TELEPHONE ENCOUNTER
[facility]ARM[facility] REVIEW: STATIN THERAPY    Routing to PharmD for statin follow-up.      Future Appointments   Date Time Provider Fredy Allen   8/26/2022  9:00 AM Carlos Duffy MD [address], [address] Clinical Pharmacy  Phone: toll free [phone]

## 2022-08-26 ENCOUNTER — OFFICE VISIT (OUTPATIENT)
Dept: PRIMARY CARE CLINIC | Age: 72
End: 2022-08-26
Payer: MEDICARE

## 2022-08-26 VITALS
OXYGEN SATURATION: 98 % | SYSTOLIC BLOOD PRESSURE: 128 MMHG | TEMPERATURE: 98.1 F | DIASTOLIC BLOOD PRESSURE: 68 MMHG | HEART RATE: 78 BPM | BODY MASS INDEX: 52.65 KG/M2 | WEIGHT: 293 LBS

## 2022-08-26 DIAGNOSIS — E11.3551 TYPE 2 DIABETES MELLITUS WITH STABLE PROLIFERATIVE RETINOPATHY OF RIGHT EYE, WITH LONG-TERM CURRENT USE OF INSULIN (HCC): Primary | ICD-10-CM

## 2022-08-26 DIAGNOSIS — E66.01 MORBID OBESITY DUE TO EXCESS CALORIES (HCC): ICD-10-CM

## 2022-08-26 DIAGNOSIS — Z79.4 TYPE 2 DIABETES MELLITUS WITH STABLE PROLIFERATIVE RETINOPATHY OF RIGHT EYE, WITH LONG-TERM CURRENT USE OF INSULIN (HCC): Primary | ICD-10-CM

## 2022-08-26 DIAGNOSIS — E79.0 HYPERURICEMIA: ICD-10-CM

## 2022-08-26 DIAGNOSIS — I10 ESSENTIAL HYPERTENSION: ICD-10-CM

## 2022-08-26 DIAGNOSIS — M17.0 PRIMARY OSTEOARTHRITIS OF BOTH KNEES: ICD-10-CM

## 2022-08-26 LAB
HBA1C MFR BLD: 7.3 %
HCT VFR BLD CALC: 33.7 % (ref 36–48)
HEMOGLOBIN: 11.2 G/DL (ref 12–16)
MCH RBC QN AUTO: 28.7 PG (ref 26–34)
MCHC RBC AUTO-ENTMCNC: 33.2 G/DL (ref 31–36)
MCV RBC AUTO: 86.4 FL (ref 80–100)
PDW BLD-RTO: 15.9 % (ref 12.4–15.4)
PLATELET # BLD: 228 K/UL (ref 135–450)
PMV BLD AUTO: 9.1 FL (ref 5–10.5)
RBC # BLD: 3.9 M/UL (ref 4–5.2)
VITAMIN D 25-HYDROXY: 13.3 NG/ML
WBC # BLD: 4.2 K/UL (ref 4–11)

## 2022-08-26 PROCEDURE — 36415 COLL VENOUS BLD VENIPUNCTURE: CPT | Performed by: FAMILY MEDICINE

## 2022-08-26 PROCEDURE — 1036F TOBACCO NON-USER: CPT | Performed by: FAMILY MEDICINE

## 2022-08-26 PROCEDURE — 83036 HEMOGLOBIN GLYCOSYLATED A1C: CPT | Performed by: FAMILY MEDICINE

## 2022-08-26 PROCEDURE — G8427 DOCREV CUR MEDS BY ELIG CLIN: HCPCS | Performed by: FAMILY MEDICINE

## 2022-08-26 PROCEDURE — G8417 CALC BMI ABV UP PARAM F/U: HCPCS | Performed by: FAMILY MEDICINE

## 2022-08-26 PROCEDURE — 3017F COLORECTAL CA SCREEN DOC REV: CPT | Performed by: FAMILY MEDICINE

## 2022-08-26 PROCEDURE — 3051F HG A1C>EQUAL 7.0%<8.0%: CPT | Performed by: FAMILY MEDICINE

## 2022-08-26 PROCEDURE — 99214 OFFICE O/P EST MOD 30 MIN: CPT | Performed by: FAMILY MEDICINE

## 2022-08-26 PROCEDURE — 1123F ACP DISCUSS/DSCN MKR DOCD: CPT | Performed by: FAMILY MEDICINE

## 2022-08-26 PROCEDURE — 1090F PRES/ABSN URINE INCON ASSESS: CPT | Performed by: FAMILY MEDICINE

## 2022-08-26 PROCEDURE — 2022F DILAT RTA XM EVC RTNOPTHY: CPT | Performed by: FAMILY MEDICINE

## 2022-08-26 PROCEDURE — G8399 PT W/DXA RESULTS DOCUMENT: HCPCS | Performed by: FAMILY MEDICINE

## 2022-08-26 RX ORDER — ALBUTEROL SULFATE 90 UG/1
2 AEROSOL, METERED RESPIRATORY (INHALATION) 2 TIMES DAILY PRN
Qty: 18 G | Refills: 2 | Status: CANCELLED | OUTPATIENT
Start: 2022-08-26

## 2022-08-26 RX ORDER — ALBUTEROL SULFATE 90 UG/1
2 AEROSOL, METERED RESPIRATORY (INHALATION) 2 TIMES DAILY PRN
Qty: 3 EACH | Refills: 1 | Status: CANCELLED | OUTPATIENT
Start: 2022-08-26

## 2022-08-26 RX ORDER — ALBUTEROL SULFATE 90 UG/1
2 AEROSOL, METERED RESPIRATORY (INHALATION) 2 TIMES DAILY PRN
COMMUNITY
Start: 2022-08-07

## 2022-08-26 ASSESSMENT — ENCOUNTER SYMPTOMS
BLOOD IN STOOL: 0
ABDOMINAL PAIN: 0
DIARRHEA: 0
SHORTNESS OF BREATH: 0
CONSTIPATION: 0

## 2022-08-26 NOTE — PROGRESS NOTES
Alphonse Go MD   metFORMIN (GLUCOPHAGE) 1000 MG tablet Take 1 tablet by mouth 2 times daily (with meals) Yes Alphonse Go MD   telmisartan (MICARDIS) 80 MG tablet TAKE 1 TABLET BY MOUTH  DAILY Yes Alphonse Go MD   potassium chloride (KLOR-CON M) 10 MEQ extended release tablet Take 1 tablet by mouth every other day Yes Alphonse Go MD   triamterene-hydroCHLOROthiazide (MAXZIDE-25) 37.5-25 MG per tablet Take 1 tablet by mouth daily Yes Alphonse Go MD   Continuous Blood Gluc Transmit (DEXCOM G6 TRANSMITTER) MISC Use 3-4 times daily to check glucose continually. Yes Alphonse Go MD   Continuous Blood Gluc  (DEXCOM G6 ) SHYANNE Use 3-4 times daily to check glucose continually Yes Alphonse Go MD   Continuous Blood Gluc Sensor (DEXCOM G6 SENSOR) MISC Use 3-4 times daily to check glucose continually Yes Alphonse Go MD   allopurinol (ZYLOPRIM) 300 MG tablet Take 1 tablet by mouth daily Yes Alphonse Go MD   blood glucose test strips (ACCU-CHEK GUIDE) strip USE 1 STRIP TO CHECK GLUCOSE 4 TIMES DAILY Yes Alphonse Go MD   insulin NPH (HUMULIN N) 100 UNIT/ML injection vial INJECT SUBCUTANEOUSLY 54  UNITS AT BREAKFAST  Patient taking differently: 0 Anthony Vazquez MD   insulin regular (HUMULIN R) 100 UNIT/ML injection Inject 20 units at breakfast; 10 units at lunch; 20 units at dinner. Patient taking differently: Inject 20 units at breakfast; 20 units at dinner. Yes Alphonse Go MD   Insulin Syringe-Needle U-100 31G X \" 1 ML MISC Use with both BID Humulin N and TID Humulin R injections = 5 times daily.  Yes Alphonse Go MD        Social History     Tobacco Use    Smoking status: Former     Packs/day: 0.50     Years: 20.00     Pack years: 10.00     Types: Cigarettes     Quit date: 3/1/1996     Years since quittin.5    Smokeless tobacco: Never   Substance Use Topics    Alcohol use: No        Vitals: 08/26/22 0853   BP: 128/68   Pulse: 78   Temp: 98.1 °F (36.7 °C)   SpO2: 98%   Weight: (!) 316 lb 6.4 oz (143.5 kg)     Estimated body mass index is 52.65 kg/m² as calculated from the following:    Height as of 2/28/22: 5' 5\" (1.651 m). Weight as of this encounter: 316 lb 6.4 oz (143.5 kg). Physical Exam  Constitutional:       General: She is not in acute distress. Appearance: She is well-developed. HENT:      Head: Normocephalic. Eyes:      Conjunctiva/sclera: Conjunctivae normal.   Neck:      Thyroid: No thyromegaly. Cardiovascular:      Rate and Rhythm: Normal rate and regular rhythm. Heart sounds: Normal heart sounds. No murmur heard. Pulmonary:      Effort: No respiratory distress. Breath sounds: Normal breath sounds. No wheezing or rales. Abdominal:      General: There is no distension. Palpations: Abdomen is soft. There is no mass. Tenderness: There is no guarding or rebound. Musculoskeletal:         General: Normal range of motion. Cervical back: Neck supple. Skin:     General: Skin is warm. Findings: No rash. Neurological:      Mental Status: She is alert and oriented to person, place, and time. Psychiatric:         Behavior: Behavior normal.       ASSESSMENT/PLAN:  1. Type 2 diabetes mellitus with stable proliferative retinopathy of right eye, with long-term current use of insulin (HCC)  Controlled. Continue current medication, metformin, Humulin and and Humulin R.    2. Essential hypertension  Controlled. Continue Micardis 80 mg daily, Maxide 25 mg daily and amlodipine 5 mg daily    3. Hyperuricemia  Doing well without recent flareup. Continue allopurinol 300 mg daily. 4. Morbid obesity due to excess calories ([address]  Encouraged to lose weight with diet and exercise. 5. Primary osteoarthritis of both knees  Take Tylenol 3-4 times a day as needed. RTC in 3-4 mos.     An electronic signature was used to authenticate this note.    --CLAUDE SOTO Efrem Modi MD on 8/26/2022 at 9:32 AM

## 2022-08-27 LAB
A/G RATIO: 1.5 (ref 1.1–2.2)
ALBUMIN SERPL-MCNC: 3.9 G/DL (ref 3.4–5)
ALP BLD-CCNC: 72 U/L (ref 40–129)
ALT SERPL-CCNC: 11 U/L (ref 10–40)
ANION GAP SERPL CALCULATED.3IONS-SCNC: 12 MMOL/L (ref 3–16)
AST SERPL-CCNC: 15 U/L (ref 15–37)
BILIRUB SERPL-MCNC: 0.4 MG/DL (ref 0–1)
BUN BLDV-MCNC: 18 MG/DL (ref 7–20)
CALCIUM SERPL-MCNC: 9.2 MG/DL (ref 8.3–10.6)
CHLORIDE BLD-SCNC: 105 MMOL/L (ref 99–110)
CHOLESTEROL, TOTAL: 193 MG/DL (ref 0–199)
CO2: 25 MMOL/L (ref 21–32)
CREAT SERPL-MCNC: 1.1 MG/DL (ref 0.6–1.2)
GFR AFRICAN AMERICAN: 59
GFR NON-AFRICAN AMERICAN: 49
GLUCOSE BLD-MCNC: 131 MG/DL (ref 70–99)
HDLC SERPL-MCNC: 61 MG/DL (ref 40–60)
LDL CHOLESTEROL CALCULATED: 117 MG/DL
POTASSIUM SERPL-SCNC: 4.8 MMOL/L (ref 3.5–5.1)
SODIUM BLD-SCNC: 142 MMOL/L (ref 136–145)
T4 FREE: 1.1 NG/DL (ref 0.9–1.8)
TOTAL PROTEIN: 6.5 G/DL (ref 6.4–8.2)
TRIGL SERPL-MCNC: 74 MG/DL (ref 0–150)
TSH SERPL DL<=0.05 MIU/L-ACNC: 4.31 UIU/ML (ref 0.27–4.2)
VLDLC SERPL CALC-MCNC: 15 MG/DL

## 2022-08-31 NOTE — TELEPHONE ENCOUNTER
[facility] HE[facility] CLINICAL PHARMACY REVIEW: Colleen Cunninghamteresaelisa 3 sent faxes to PCP office twice so far in 2022 (see Media tab: 02/17/2022 and 08/12/2022). No response from PCP or documentation of discussion with patient. Will continue to follow and consider recommendation at later date.      Opal Enriquez, PharmD, BCACP, [address]   Department, toll free: [phone], option 1    =======================================================    For Pharmacy Admin Tracking Only  Gap Closed?: No   Time Spent (min): 15

## 2022-09-12 RX ORDER — POTASSIUM CHLORIDE 750 MG/1
10 TABLET, EXTENDED RELEASE ORAL EVERY OTHER DAY
Qty: 45 TABLET | Refills: 1 | Status: SHIPPED | OUTPATIENT
Start: 2022-09-12

## 2022-09-20 DIAGNOSIS — Z79.4 TYPE 2 DIABETES MELLITUS WITHOUT COMPLICATION, WITH LONG-TERM CURRENT USE OF INSULIN (HCC): ICD-10-CM

## 2022-09-20 DIAGNOSIS — E11.9 TYPE 2 DIABETES MELLITUS WITHOUT COMPLICATION, WITH LONG-TERM CURRENT USE OF INSULIN (HCC): ICD-10-CM

## 2022-09-21 RX ORDER — BLOOD SUGAR DIAGNOSTIC
STRIP MISCELLANEOUS
Qty: 400 EACH | Refills: 1 | Status: SHIPPED | OUTPATIENT
Start: 2022-09-21

## 2022-10-18 ENCOUNTER — OFFICE VISIT (OUTPATIENT)
Dept: ORTHOPEDIC SURGERY | Age: 72
End: 2022-10-18
Payer: MEDICARE

## 2022-10-18 VITALS — HEIGHT: 65 IN | BODY MASS INDEX: 48.82 KG/M2 | RESPIRATION RATE: 16 BRPM | WEIGHT: 293 LBS

## 2022-10-18 DIAGNOSIS — M25.469 KNEE SWELLING: Primary | ICD-10-CM

## 2022-10-18 PROCEDURE — 1090F PRES/ABSN URINE INCON ASSESS: CPT | Performed by: ORTHOPAEDIC SURGERY

## 2022-10-18 PROCEDURE — 3017F COLORECTAL CA SCREEN DOC REV: CPT | Performed by: ORTHOPAEDIC SURGERY

## 2022-10-18 PROCEDURE — G8399 PT W/DXA RESULTS DOCUMENT: HCPCS | Performed by: ORTHOPAEDIC SURGERY

## 2022-10-18 PROCEDURE — G8427 DOCREV CUR MEDS BY ELIG CLIN: HCPCS | Performed by: ORTHOPAEDIC SURGERY

## 2022-10-18 PROCEDURE — 1036F TOBACCO NON-USER: CPT | Performed by: ORTHOPAEDIC SURGERY

## 2022-10-18 PROCEDURE — 99203 OFFICE O/P NEW LOW 30 MIN: CPT | Performed by: ORTHOPAEDIC SURGERY

## 2022-10-18 PROCEDURE — G8417 CALC BMI ABV UP PARAM F/U: HCPCS | Performed by: ORTHOPAEDIC SURGERY

## 2022-10-18 PROCEDURE — 1123F ACP DISCUSS/DSCN MKR DOCD: CPT | Performed by: ORTHOPAEDIC SURGERY

## 2022-10-18 PROCEDURE — G8484 FLU IMMUNIZE NO ADMIN: HCPCS | Performed by: ORTHOPAEDIC SURGERY

## 2022-10-18 NOTE — PROGRESS NOTES
CHIEF COMPLAINT: Right knee swelling    History:   Pricila Morrison is a 81 y.o. female self-referred for evaluation and treatment of right knee swelling. The patient complains of right knee swelling. This is evaluated as a personal injury. The symptoms began years ago in the 1990s. Rates pain 0/10. There was not a history of injury. She states that she has had intermittent swelling in her knee for the last 3 decades. The knee has not given out or felt unstable. The patient can bend and straighten the knee fully. There was not catching / locking of the knee. The patient has not had PT. The patient has not had an injection. The patient has not taken NSAIDs because she states this affects her glucose monitor. The patient has not tried ice. She describes being diagnosed with gout in her foot last year by her podiatrist after she had blood work done at [facility]. She is taking allopurinol.       Past Medical History:   Diagnosis Date    Cataract     Diabetic retinopathy ([facility] [address]     High blood pressure     Proliferative diabetic retinopathy, right eye [facility])        Past Surgical History:   Procedure Laterality Date    CARPAL TUNNEL RELEASE  1999,2000    Bilateral    CATARACT REMOVAL WITH IMPLANT      [facility]    COLONOSCOPY  02/2022    Dr. Tarah Hopper    COLONOSCOPY N/A 2/28/2022    COLONOSCOPY performed by Mikayla Mas MD at [address] [place] [address] Street Left 03/08/2018    left ring finger    HAND SURGERY  2002, 2008    Left x5    HYSTERECTOMY (CERVIX STATUS UNKNOWN)      SHOULDER SURGERY  2005    Right       Family History   Problem Relation Age of Onset    Heart Disease Mother     Diabetes Mother     Stroke Mother     Heart Failure Mother     Diabetes Sister     Heart Disease Sister         pacemaker and deflibulator    Stroke Sister     Heart Attack Brother     Diabetes Brother        Social History     Socioeconomic History    Marital status: Single    Number of children: 2   Tobacco Use    Smoking status: Former     Packs/day: 0.50     Years: 20.00     Pack years: 10.00     Types: Cigarettes     Quit date: 3/1/1996     Years since quittin.6    Smokeless tobacco: Never   Vaping Use    Vaping Use: Never used   Substance and Sexual Activity    Alcohol use: No    Drug use: Never    Sexual activity: Never     Social Determinants of Health     Financial Resource Strain: Low Risk     Difficulty of Paying Living Expenses: Not very hard   Food Insecurity: No Food Insecurity    Worried About Running Out of Food in the Last Year: Never true    Ran Out of Food in the Last Year: Never true   Physical Activity: Inactive    Days of Exercise per Week: 0 days    Minutes of Exercise per Session: 0 min       Current Outpatient Medications   Medication Sig Dispense Refill    ACCU-CHEK GUIDE strip USE 1 STRIP TO CHECK GLUCOSE 4 TIMES DAILY 400 each 1    potassium chloride (KLOR-CON M) 10 MEQ extended release tablet Take 1 tablet by mouth every other day 45 tablet 1    albuterol sulfate HFA (PROVENTIL;VENTOLIN;PROAIR) 108 (90 Base) MCG/ACT inhaler Inhale 2 puffs into the lungs 2 times daily as needed      amLODIPine (NORVASC) 5 MG tablet Take 1 tablet by mouth in the morning. 90 tablet 1    metFORMIN (GLUCOPHAGE) 1000 MG tablet Take 1 tablet by mouth 2 times daily (with meals) 180 tablet 1    telmisartan (MICARDIS) 80 MG tablet TAKE 1 TABLET BY MOUTH  DAILY 90 tablet 3    triamterene-hydroCHLOROthiazide (MAXZIDE-25) 37.5-25 MG per tablet Take 1 tablet by mouth daily 90 tablet 1    Continuous Blood Gluc Transmit (DEXCOM G6 TRANSMITTER) MISC Use 3-4 times daily to check glucose continually.  1 each 0    Continuous Blood Gluc  (DEXCOM G6 ) SHYANNE Use 3-4 times daily to check glucose continually 1 each 0    Continuous Blood Gluc Sensor (DEXCOM G6 SENSOR) MISC Use 3-4 times daily to check glucose continually 6 each 1    allopurinol (ZYLOPRIM) 300 MG tablet Take 1 tablet by mouth daily 90 tablet 1    insulin NPH (HUMULIN N) 100 UNIT/ML injection vial INJECT SUBCUTANEOUSLY 54  UNITS AT BREAKFAST (Patient taking differently: INJECT SUBCUTANEOUSLY 55  UNITS AT BREAKFAST) 49 mL 1    insulin regular (HUMULIN R) 100 UNIT/ML injection Inject 20 units at breakfast; 10 units at lunch; 20 units at dinner. (Patient taking differently: Inject 20 units at breakfast; 20 units at dinner.) 41 mL 1    Insulin Syringe-Needle U-100 31G X 5/16\" 1 ML MISC Use with both BID Humulin N and TID Humulin R injections = 5 times daily. 450 each 1     No current facility-administered medications for this visit. Allergies   Allergen Reactions    Cephalexin Itching     Can take Amoxil    Losartan Other (See Comments)     lightheaded         Physical Examination:     Vital signs:   Resp 16   Ht 5' 5\" (1.651 m)   Wt (!) 316 lb 6.4 oz (143.5 kg)   BMI 52.65 kg/m²     General:  alert, appears stated age, cooperative, and no distress   Gait:  Normal. The patient can bear weight on the injured extremity. Right Knee  Alignment:  neutral   ROM:  0 degrees extension to 120 degrees flexion   Bilateral knees   Crepitus:  no   Joint Tenderness:  none   Effusion:   40 cc   Patellar excursion:  2 of 4 quadrants    Patellar tilt test:  positive   Patellar facet tenderness:  positive medial   positive lateral   Anterior drawer test:  negative   Posterior drawer:   negative   Varus laxity at 30 degrees:  negative   Valgus laxity at 30 degrees:   negative   George's test:  negative     There is not any cellulitis, lymphedema or cutaneous lesions noted in the lower extremities. Motor exam of the lower extremities show quadriceps, hamstrings, foot dorsiflexion and plantarflexion grossly intact. Sensation to both feet is grossly intact to light touch. The bilateral lower extremities are warm and well-perfused with brisk capillary refill.       Imaging:  Right Knee X-Ray: 3 view obtained and reviewed. No fracture, dislocation, lytic lesion. She has relatively maintained tibiofemoral joint space. She has moderate bilateral patellofemoral narrowing with marginal osteophytes. Assessment:     Chronic recurrent right knee effusion  Right knee osteoarthritis  Gout  Morbid obesity  Diabetes with retinopathy  Hypertension        Plan:     Natural history and expected course discussed. Questions answered. Discussed with the patient that her recurrent knee effusions may be secondary to her gout. She is already being treated for gout with allopurinol. She does have swelling but no pain. We did discuss knee aspiration and sending the fluid off for microscopic analysis to confirm diagnosis of gout. However, we discussed that this would not really change her treatment. She elected to defer on knee aspiration. Ice right knee as needed. Since she has had issues with the oral NSAIDs affecting her glucometer, we discussed considering Voltaren gel. Follow-up as needed. Oriana Louis. Saeed Vyas MD  Orthopaedic Surgery and Sports Medicine     Disclaimer: This note was generated with use of a verbal recognition program and an attempt was made to check for errors. It is possible that there are still dictated errors within this office note. If so, please bring any significant errors to my attention for an addendum. All efforts were made to ensure that this office note is accurate.

## 2022-10-19 DIAGNOSIS — I10 ESSENTIAL HYPERTENSION: ICD-10-CM

## 2022-10-20 RX ORDER — TRIAMTERENE AND HYDROCHLOROTHIAZIDE 37.5; 25 MG/1; MG/1
1 TABLET ORAL DAILY
Qty: 90 TABLET | Refills: 1 | Status: SHIPPED | OUTPATIENT
Start: 2022-10-20

## 2022-11-01 ENCOUNTER — TELEPHONE (OUTPATIENT)
Dept: PHARMACY | Facility: CLINIC | Age: 72
End: 2022-11-01

## 2022-11-01 NOTE — TELEPHONE ENCOUNTER
[facility] REVIEW: STATIN THERAPY    Routing to PharmD for statin follow-up.      Future Appointments   Date Time Provider Fredy Allen   12/19/2022 10:15 AM MD Saba Fernandez RD, [address] Specialist  [address] [place] [address],[address] Floor Clinical Pharmacy  Phone: toll free [phone]

## 2022-12-06 RX ORDER — AMLODIPINE BESYLATE 5 MG/1
5 TABLET ORAL DAILY
Qty: 90 TABLET | Refills: 1 | Status: SHIPPED | OUTPATIENT
Start: 2022-12-06

## 2022-12-16 NOTE — TELEPHONE ENCOUNTER
Dr. Aileen Isbell MD,    Patient with DM, 71yo; LDL, total cholesterol, and ASCVD risk have increased over the past few years. LDL >100, ASCVD risk score >20%. Would patient benefit from moderate- to high-intensity statin therapy? Please consider starting rosuvastatin at appointment on Monday as patient has had side effect to atorvastatin in past.     Last visit: 08/26/2022, Next visit: 12/19/2022     See encounter note(s) below for complete details. Please let me know if you have any questions.       Thank you,  Carmita Raman, PharmD, BCACP, [address]   Department, toll free: [phone], option 1     =======================================================    POPULATION HEALTH CLINICAL PHARMACY REVIEW: STATIN THERAPY  Identified statin use in persons with diabetes (SUPD) care gap per Bin[facility]; Records dated: 12/04/2022    Last Office Visit: 08/26/2022  Pharmacy: [facility]    Allergies   Allergen Reactions    Cephalexin Itching     Can take Amoxil    Losartan Other (See Comments)     lightheaded     ASSESSMENT  STATIN GAP IDENTIFIED    Per Reconciled Dispense Report as of 12/05/2022:  Atorvastatin filled twice in 2019 and once in 2020    Lab Results   Component Value Date    CHOL 193 08/26/2022    TRIG 74 08/26/2022    HDL 61 (H) 08/26/2022    LDLCALC 117 (H) 08/26/2022     ALT   Date Value Ref Range Status   08/26/2022 11 10 - 40 U/L Final     AST   Date Value Ref Range Status   08/26/2022 15 15 - 37 U/L Final     The 10-year ASCVD risk score (Vibha CURRY, et al., 2019) is: 25.8%    Values used to calculate the score:      Age: 81 years      Sex: Female      Is Non- : Yes      Diabetic: Yes      Tobacco smoker: No      Systolic Blood Pressure: 906 mmHg      Is BP treated: Yes      HDL Cholesterol: 61 mg/dL      Total Cholesterol: 193 mg/dL     2022 ADA Guidelines - Primary Prevention in patients aged 40-75 years:   No history of ASCVD: Moderate-intensity statin is recommended. History of ASCVD or 10-year ASCVD risk >20%: High-intensity statin is recommended. Multiple ASCVD risk factors (dyslipidemia, obesity, hypertension, smoking, family history of premature coronary disease, chronic kidney disease, presence of albuminuria, duration of diabetes) or aged 50-70 years: Consider high-intensity statin. PLAN  The following are interventions that have been identified:   - Patient identified as having SUPD gap    Patient's LDL >100, ASCVD risk score 25.8%, age 43-69 years, LFTs WNL, has DM. Multiple evidence-based clinical guidelines recommend patient be on statin therapy. Patient reported joint pain/stiffness with atorvastatin. According to the 2018 AHA/ACC Guideline on the Management of Blood Cholesterol, in patients who experienced statin associated side effects that were not severe, it is recommended to reassess and rechallenge to achieve maximal LDL-C reduction using a modified dosing regimen, an alternate statin (such as a hydrophylic statin), or a combination of a statin with non-statin therapy. Additionally, some strategies to help tolerate statins could be three time weekly dosing and while the 2018 AHA/ACC Guideline on the Management of Blood Cholesterol makes no recommendation for using coenzyme Q10 with statins or ensuring patient is not vitamin D deficient, there is some data that suggest these could be beneficial. It is also important to avoid any DDIs with statins and patient's other medications. Patient has adamantly refused statin therapy the past couple years because she doesn't want the side effects again and she doesn't think she needs. Did note that patient had side effect to losartan and appears to be doing fine on telmisartan and that she has arthralgias currently without being on statin therapy. Will send nTAG Interactive message to patient regarding statin benefits.       Latest Reference Range & Units 12/19/19 09:00 5/4/21 10:09 8/26/22 09:10   CHOLESTEROL, TOTAL, 603350 0 - 199 mg/dL   193   Cholesterol, Fasting 0 - 199 mg/dL 134 156    HDL Cholesterol 40 - 60 mg/dL 66 (H) 62 (H) 61 (H)   LDL Calculated <100 mg/dL 55 79 117 (H)   Triglycerides 0 - 150 mg/dL   74   Triglyceride, Fasting 0 - 150 mg/dL 64 77      Total cholesterol and LDL have increased over the past few years. 10-year ASCVD risk scores have as well. 2022: 25.8%  2021: 25.1%  2020: 18.9%    Will send recommendation to provider for upcoming appointment.      Future Appointments   Date Time Provider Fredy Allen   12/19/2022 10:15 AM MD [facility] Aura Raman, PharmD, BCACP, [address] [facility], toll free: [phone], option 1

## 2022-12-19 ENCOUNTER — OFFICE VISIT (OUTPATIENT)
Dept: PRIMARY CARE CLINIC | Age: 72
End: 2022-12-19
Payer: MEDICARE

## 2022-12-19 VITALS
BODY MASS INDEX: 52.25 KG/M2 | DIASTOLIC BLOOD PRESSURE: 60 MMHG | SYSTOLIC BLOOD PRESSURE: 126 MMHG | WEIGHT: 293 LBS | RESPIRATION RATE: 18 BRPM | TEMPERATURE: 97.2 F | HEART RATE: 88 BPM

## 2022-12-19 DIAGNOSIS — I10 ESSENTIAL HYPERTENSION: ICD-10-CM

## 2022-12-19 DIAGNOSIS — J06.9 ACUTE URI: Primary | ICD-10-CM

## 2022-12-19 DIAGNOSIS — E79.0 HYPERURICEMIA: ICD-10-CM

## 2022-12-19 DIAGNOSIS — E11.9 TYPE 2 DIABETES MELLITUS WITHOUT COMPLICATION, WITH LONG-TERM CURRENT USE OF INSULIN (HCC): ICD-10-CM

## 2022-12-19 DIAGNOSIS — Z79.4 TYPE 2 DIABETES MELLITUS WITHOUT COMPLICATION, WITH LONG-TERM CURRENT USE OF INSULIN (HCC): ICD-10-CM

## 2022-12-19 DIAGNOSIS — M17.0 PRIMARY OSTEOARTHRITIS OF BOTH KNEES: ICD-10-CM

## 2022-12-19 LAB — HBA1C MFR BLD: 8 %

## 2022-12-19 PROCEDURE — 1123F ACP DISCUSS/DSCN MKR DOCD: CPT | Performed by: FAMILY MEDICINE

## 2022-12-19 PROCEDURE — 3078F DIAST BP <80 MM HG: CPT | Performed by: FAMILY MEDICINE

## 2022-12-19 PROCEDURE — 99214 OFFICE O/P EST MOD 30 MIN: CPT | Performed by: FAMILY MEDICINE

## 2022-12-19 PROCEDURE — 2022F DILAT RTA XM EVC RTNOPTHY: CPT | Performed by: FAMILY MEDICINE

## 2022-12-19 PROCEDURE — 1036F TOBACCO NON-USER: CPT | Performed by: FAMILY MEDICINE

## 2022-12-19 PROCEDURE — G8484 FLU IMMUNIZE NO ADMIN: HCPCS | Performed by: FAMILY MEDICINE

## 2022-12-19 PROCEDURE — G8417 CALC BMI ABV UP PARAM F/U: HCPCS | Performed by: FAMILY MEDICINE

## 2022-12-19 PROCEDURE — 3074F SYST BP LT 130 MM HG: CPT | Performed by: FAMILY MEDICINE

## 2022-12-19 PROCEDURE — 3052F HG A1C>EQUAL 8.0%<EQUAL 9.0%: CPT | Performed by: FAMILY MEDICINE

## 2022-12-19 PROCEDURE — G8399 PT W/DXA RESULTS DOCUMENT: HCPCS | Performed by: FAMILY MEDICINE

## 2022-12-19 PROCEDURE — 1090F PRES/ABSN URINE INCON ASSESS: CPT | Performed by: FAMILY MEDICINE

## 2022-12-19 PROCEDURE — 83036 HEMOGLOBIN GLYCOSYLATED A1C: CPT | Performed by: FAMILY MEDICINE

## 2022-12-19 PROCEDURE — G8427 DOCREV CUR MEDS BY ELIG CLIN: HCPCS | Performed by: FAMILY MEDICINE

## 2022-12-19 PROCEDURE — 3017F COLORECTAL CA SCREEN DOC REV: CPT | Performed by: FAMILY MEDICINE

## 2022-12-19 RX ORDER — PROMETHAZINE HYDROCHLORIDE AND CODEINE PHOSPHATE 6.25; 1 MG/5ML; MG/5ML
5 SYRUP ORAL 4 TIMES DAILY PRN
Qty: 240 ML | Refills: 0 | Status: SHIPPED | OUTPATIENT
Start: 2022-12-19 | End: 2022-12-29

## 2022-12-19 RX ORDER — LORATADINE 10 MG/1
10 TABLET ORAL DAILY
COMMUNITY

## 2022-12-19 ASSESSMENT — ENCOUNTER SYMPTOMS
DIARRHEA: 0
COUGH: 1
ABDOMINAL PAIN: 0
CONSTIPATION: 0
BLOOD IN STOOL: 0
SHORTNESS OF BREATH: 0

## 2022-12-19 NOTE — PROGRESS NOTES
2022     Dania Baez (:  1950) is a 81 y.o. female, here for evaluation of the following medical concerns:    Diabetes Mellitus Type 2: Current symptoms/problems include none and neuropathy. Medication compliance:  compliant most of the time  Diabetic diet compliance:  compliant most of the time,  Weight trend: stable  Current exercise: no regular exercise due to knee pain  Barriers: none    Home blood sugar records: patient tests 3 time(s) per day patient require frequent testing due to frequent dose adjustment. She also has multiple daily injection of insulin. Any episodes of hypoglycemia? yes - usually at night  BS drops to 60's  Eye exam current (within one year): unknown    Patient has hypertension controlled with Micardis 80 mg daily, Maxide 25 and amlodipine 5 mg daily. She is morbidly obese and  struggling to lose weight. Unable to do exercise due to osteoarthritis of both knees. She has hyperuricemia controlled with allopurinol 300 mg daily, no recent flareup of gout. She denies headache nausea vomiting denies chest pain or shortness of breath. Denies bowel or urinary disturbance. reports that she quit smoking about 26 years ago. Her smoking use included cigarettes. She has a 10.00 pack-year smoking history. She has never used smokeless tobacco.   Daily Aspirin? Yes    Lab Results   Component Value Date    LABA1C 8.0 2022    LABA1C 7.3 2022    LABA1C 7.1 2022     Lab Results   Component Value Date    LABMICR Not Indicated 2016    CREATININE 1.1 2022     Lab Results   Component Value Date    ALT 11 2022    AST 15 2022     Lab Results   Component Value Date    CHOL 193 2022    TRIG 74 2022    HDL 61 (H) 2022    LDLCALC 117 (H) 2022          Review of Systems   Constitutional:  Negative for activity change and appetite change. Eyes:  Negative for visual disturbance. Respiratory:  Positive for cough.  Negative for shortness of breath. Cardiovascular:  Negative for chest pain and leg swelling. Gastrointestinal:  Negative for abdominal pain, blood in stool, constipation and diarrhea. Genitourinary:  Negative for difficulty urinating, frequency, hematuria, menstrual problem and urgency. Neurological:  Negative for dizziness and syncope. Psychiatric/Behavioral:  Negative for behavioral problems. Prior to Visit Medications    Medication Sig Taking? Authorizing Provider   loratadine (CLARITIN) 10 MG tablet Take 10 mg by mouth daily OTC Yes Historical Provider, MD   insulin NPH (HUMULIN N) 100 UNIT/ML injection vial INJECT SUBCUTANEOUSLY 55  UNITS AT BREAKFAST; as of 12/19/22 Yes Dari Sandhu MD   insulin regular (HUMULIN R) 100 UNIT/ML injection Inject 20 units at breakfast; 15 units at dinner. As of 12/19/22 Yes Dari Sandhu MD   promethazine-[facility] WITH CODEINE) 6.25-10 MG/5ML syrup Take 5 mLs by mouth 4 times daily as needed for Cough for up to 10 days. May cause drowsiness.  Yes Dari Sandhu MD   amLODIPine (NORVASC) 5 MG tablet Take 1 tablet by mouth daily Yes Dari Sandhu MD   triamterene-hydroCHLOROthiazide (MAXZIDE-25) 37.5-25 MG per tablet Take 1 tablet by mouth daily Yes Dari Sandhu MD   potassium chloride (KLOR-CON M) 10 MEQ extended release tablet Take 1 tablet by mouth every other day Yes Dari Sandhu MD   albuterol sulfate HFA (PROVENTIL;VENTOLIN;PROAIR) 108 (90 Base) MCG/ACT inhaler Inhale 2 puffs into the lungs 2 times daily as needed Yes Historical Provider, MD   metFORMIN (GLUCOPHAGE) 1000 MG tablet Take 1 tablet by mouth 2 times daily (with meals)  Patient taking differently: Take 1,000 mg by mouth daily (with breakfast) Only taking daily per pt 12/19/22 Yes Dari Sandhu MD   telmisartan (MICARDIS) 80 MG tablet TAKE 1 TABLET BY MOUTH  DAILY Yes Dari Sandhu MD   allopurinol (ZYLOPRIM) 300 MG tablet Take 1 tablet by mouth daily Yes Dari Sandhu MD ACCU-CHEK GUIDE strip USE 1 STRIP TO CHECK GLUCOSE 4 TIMES DAILY  Ovidio Rodriguez MD   Continuous Blood Gluc Transmit (DEXCOM G6 TRANSMITTER) MISC Use 3-4 times daily to check glucose continually. Ovidio Rodriguez MD   Continuous Blood Gluc  (DEXCOM G6 ) SHYANNE Use 3-4 times daily to check glucose continually  Ovidio Rodriguez MD   Continuous Blood Gluc Sensor (DEXCOM G6 SENSOR) MISC Use 3-4 times daily to check glucose continually  Ovidio Rodriguez MD   Insulin Syringe-Needle U-100 31G X \" 1 ML MISC Use with both BID Humulin N and TID Humulin R injections = 5 times daily. Ovidio Rodriguez MD        Social History     Tobacco Use    Smoking status: Former     Packs/day: 0.50     Years: 20.00     Pack years: 10.00     Types: Cigarettes     Quit date: 3/1/1996     Years since quittin.8    Smokeless tobacco: Never   Substance Use Topics    Alcohol use: No        Vitals:    22 1046   BP: 126/60   Pulse: 88   Resp: 18   Temp: 97.2 °F (36.2 °C)   TempSrc: Temporal   Weight: (!) 314 lb (142.4 kg)     Estimated body mass index is 52.25 kg/m² as calculated from the following:    Height as of 10/18/22: 5' 5\" (1.651 m). Weight as of this encounter: 314 lb (142.4 kg). Physical Exam  Constitutional:       Appearance: She is well-developed. HENT:      Head: Normocephalic. Right Ear: External ear normal.      Nose: Nose normal.   Eyes:      Conjunctiva/sclera: Conjunctivae normal.      Pupils: Pupils are equal, round, and reactive to light. Neck:      Thyroid: No thyromegaly. Cardiovascular:      Rate and Rhythm: Normal rate and regular rhythm. Heart sounds: Normal heart sounds. No murmur heard. No friction rub. Pulmonary:      Effort: Pulmonary effort is normal.      Breath sounds: Normal breath sounds. Abdominal:      General: Bowel sounds are normal.      Palpations: Abdomen is soft. There is no mass. Musculoskeletal:         General: Normal range of motion. Cervical back: Normal range of motion and neck supple. Lymphadenopathy:      Cervical: No cervical adenopathy. Skin:     General: Skin is warm. Findings: No rash. Neurological:      Mental Status: She is alert and oriented to person, place, and time. ASSESSMENT/PLAN:  1. Acute URI  She has a recent respiratory infection likely viral, advised to take Tylenol as needed Claritin for nasal congestion and Phenergan with codeine for persistent dry cough. - loratadine (CLARITIN) 10 MG tablet; Take 10 mg by mouth daily OTC  - promethazine-codeine (PHENERGAN WITH CODEINE) 6.25-10 MG/5ML syrup; Take 5 mLs by mouth 4 times daily as needed for Cough for up to 10 days. May cause drowsiness. Dispense: 240 mL; Refill: 0    2. Type 2 diabetes mellitus without complication, with long-term current use of insulin (HCC)  Poorly controlled. HbA1c today is 8%. Patient required frequent blood sugar testing due to multiple daily injections of insulin which requires frequent adjustment. Patient advised to reduce dose evening insulin from 20 units to 15 units and continue continue breakfast dose of 20 units. Continue NPH insulin at 55 units daily. Advise continuous glucose monitoring.  - insulin NPH (HUMULIN N) 100 UNIT/ML injection vial; INJECT SUBCUTANEOUSLY 55  UNITS AT BREAKFAST; as of 12/19/22  Dispense: 49 mL; Refill: 1  - insulin regular (HUMULIN R) 100 UNIT/ML injection; Inject 20 units at breakfast; 15 units at dinner. As of 12/19/22  Dispense: 36 mL; Refill: 1    3. Essential hypertension  Controlled. Continue Micardis 80 mg daily, Maxide 25 mg daily and amlodipine 5 mg daily. 4. Hyperuricemia  No recent flareup of gout. Continue allopurinol 300 mg daily    5.  Primary osteoarthritis of both knees  May take Tylenol 3-4 times a day as needed for      Artc in 3-4 mos    An electronic signature was used to authenticate this note.    --Che Marrero MD on 12/19/2022 at 11:11 AM

## 2022-12-21 ENCOUNTER — TELEPHONE (OUTPATIENT)
Dept: PRIMARY CARE CLINIC | Age: 72
End: 2022-12-21

## 2022-12-21 DIAGNOSIS — R05.1 ACUTE COUGH: Primary | ICD-10-CM

## 2022-12-21 NOTE — TELEPHONE ENCOUNTER
[facility] called in stating that Dr. Miguel A Pelaez sent over a prescription for promethazine with codeine and they no longer carry that and would like to know if they can switch it for something else.      Please call back: [phone]

## 2022-12-22 RX ORDER — GUAIFENESIN AND CODEINE PHOSPHATE 100; 10 MG/5ML; MG/5ML
5 SOLUTION ORAL 2 TIMES DAILY PRN
Qty: 60 ML | Refills: 0 | Status: SHIPPED | OUTPATIENT
Start: 2022-12-22 | End: 2022-12-28

## 2022-12-22 NOTE — TELEPHONE ENCOUNTER
PT called in regarding this medication. PT is waiting for it to be sent to her pharmacy so that she can pick it up and she would like for this to be done ASAP due to the inclement weather that we are expecting.      Best call back number: [phone]

## 2023-02-27 RX ORDER — POTASSIUM CHLORIDE 750 MG/1
10 TABLET, EXTENDED RELEASE ORAL EVERY OTHER DAY
Qty: 45 TABLET | Refills: 1 | Status: SHIPPED | OUTPATIENT
Start: 2023-02-27

## 2023-03-24 NOTE — PATIENT INSTRUCTIONS
9/18/2018  Srinivas Khan  7/24/1934  82year old   male  Duane Ehlers, MD    HPI:   Patient presents with:  Post-Op: s/p Right proximal humerus ORIF 9 weeks f/u - had sx on 7/21/18 - states he has no pain - has some numbness in the r index finger and they are in agreement with the treatment plan. The patient will return to clinic in 4 weeks for further clinical and radiographic evaluation with right shoulder Grashey, outlet, and axillary lateral views.     No orders of the defined types were plac on Aging. Call [phone] or search [facility] Data on Aging online. You need 5654-6679 mg of calcium and 6191-8615 IU of vitamin D per day. It is possible to meet your calcium requirement with diet alone, but a vitamin D supplement is usually necessary to meet this goal.  When exposed to the sun, use a sunscreen that protects against both UVA and UVB radiation with an SPF of 30 or greater. Reapply every 2 to 3 hours or after sweating, drying off with a towel, or swimming. Always wear a seat belt when traveling in a car. Always wear a helmet when riding a bicycle or motorcycle.

## 2023-03-27 ENCOUNTER — OFFICE VISIT (OUTPATIENT)
Dept: FAMILY MEDICINE CLINIC | Age: 73
End: 2023-03-27
Payer: MEDICARE

## 2023-03-27 VITALS
WEIGHT: 293 LBS | BODY MASS INDEX: 48.82 KG/M2 | DIASTOLIC BLOOD PRESSURE: 62 MMHG | SYSTOLIC BLOOD PRESSURE: 136 MMHG | HEIGHT: 65 IN | HEART RATE: 84 BPM | OXYGEN SATURATION: 94 %

## 2023-03-27 DIAGNOSIS — Z13.220 LIPID SCREENING: ICD-10-CM

## 2023-03-27 DIAGNOSIS — Z12.31 ENCOUNTER FOR SCREENING MAMMOGRAM FOR MALIGNANT NEOPLASM OF BREAST: ICD-10-CM

## 2023-03-27 DIAGNOSIS — E11.3551 TYPE 2 DIABETES MELLITUS WITH STABLE PROLIFERATIVE RETINOPATHY OF RIGHT EYE, WITH LONG-TERM CURRENT USE OF INSULIN (HCC): Primary | ICD-10-CM

## 2023-03-27 DIAGNOSIS — Z79.4 TYPE 2 DIABETES MELLITUS WITH STABLE PROLIFERATIVE RETINOPATHY OF RIGHT EYE, WITH LONG-TERM CURRENT USE OF INSULIN (HCC): Primary | ICD-10-CM

## 2023-03-27 DIAGNOSIS — Z00.00 MEDICARE ANNUAL WELLNESS VISIT, SUBSEQUENT: ICD-10-CM

## 2023-03-27 LAB
HBA1C MFR BLD: 7.6 %
HBA1C MFR BLD: 7.6 %

## 2023-03-27 PROCEDURE — 82044 UR ALBUMIN SEMIQUANTITATIVE: CPT | Performed by: NURSE PRACTITIONER

## 2023-03-27 PROCEDURE — 83036 HEMOGLOBIN GLYCOSYLATED A1C: CPT | Performed by: NURSE PRACTITIONER

## 2023-03-27 PROCEDURE — 3051F HG A1C>EQUAL 7.0%<8.0%: CPT | Performed by: NURSE PRACTITIONER

## 2023-03-27 PROCEDURE — 1123F ACP DISCUSS/DSCN MKR DOCD: CPT | Performed by: NURSE PRACTITIONER

## 2023-03-27 PROCEDURE — 3075F SYST BP GE 130 - 139MM HG: CPT | Performed by: NURSE PRACTITIONER

## 2023-03-27 PROCEDURE — G0439 PPPS, SUBSEQ VISIT: HCPCS | Performed by: NURSE PRACTITIONER

## 2023-03-27 PROCEDURE — 3078F DIAST BP <80 MM HG: CPT | Performed by: NURSE PRACTITIONER

## 2023-03-27 SDOH — ECONOMIC STABILITY: FOOD INSECURITY: WITHIN THE PAST 12 MONTHS, YOU WORRIED THAT YOUR FOOD WOULD RUN OUT BEFORE YOU GOT MONEY TO BUY MORE.: NEVER TRUE

## 2023-03-27 SDOH — ECONOMIC STABILITY: HOUSING INSECURITY
IN THE LAST 12 MONTHS, WAS THERE A TIME WHEN YOU DID NOT HAVE A STEADY PLACE TO SLEEP OR SLEPT IN A SHELTER (INCLUDING NOW)?: NO

## 2023-03-27 SDOH — ECONOMIC STABILITY: FOOD INSECURITY: WITHIN THE PAST 12 MONTHS, THE FOOD YOU BOUGHT JUST DIDN'T LAST AND YOU DIDN'T HAVE MONEY TO GET MORE.: NEVER TRUE

## 2023-03-27 SDOH — ECONOMIC STABILITY: INCOME INSECURITY: HOW HARD IS IT FOR YOU TO PAY FOR THE VERY BASICS LIKE FOOD, HOUSING, MEDICAL CARE, AND HEATING?: NOT HARD AT ALL

## 2023-03-27 ASSESSMENT — PATIENT HEALTH QUESTIONNAIRE - PHQ9
1. LITTLE INTEREST OR PLEASURE IN DOING THINGS: 0
SUM OF ALL RESPONSES TO PHQ QUESTIONS 1-9: 0
SUM OF ALL RESPONSES TO PHQ QUESTIONS 1-9: 0
SUM OF ALL RESPONSES TO PHQ9 QUESTIONS 1 & 2: 0
SUM OF ALL RESPONSES TO PHQ QUESTIONS 1-9: 0
SUM OF ALL RESPONSES TO PHQ QUESTIONS 1-9: 0
2. FEELING DOWN, DEPRESSED OR HOPELESS: 0
SUM OF ALL RESPONSES TO PHQ QUESTIONS 1-9: 0
SUM OF ALL RESPONSES TO PHQ9 QUESTIONS 1 & 2: 0
1. LITTLE INTEREST OR PLEASURE IN DOING THINGS: 0
SUM OF ALL RESPONSES TO PHQ QUESTIONS 1-9: 0
2. FEELING DOWN, DEPRESSED OR HOPELESS: 0

## 2023-03-27 ASSESSMENT — LIFESTYLE VARIABLES
HOW OFTEN DO YOU HAVE A DRINK CONTAINING ALCOHOL: NEVER
HOW MANY STANDARD DRINKS CONTAINING ALCOHOL DO YOU HAVE ON A TYPICAL DAY: PATIENT DOES NOT DRINK

## 2023-03-27 NOTE — PROGRESS NOTES
Use 3-4 times daily to check glucose continually. Yes Jaime Álvarez MD   Continuous Blood Gluc  (DEXCOM G6 ) SHYANNE Use 3-4 times daily to check glucose continually Yes Jaime Álvarez MD   Continuous Blood Gluc Sensor (DEXCOM G6 SENSOR) MISC Use 3-4 times daily to check glucose continually Yes Jaime Álvarez MD   allopurinol (ZYLOPRIM) 300 MG tablet Take 1 tablet by mouth daily Yes Jaime Álvarez MD   Insulin Syringe-Needle U-100 31G X 5/16\" 1 ML MISC Use with both BID Humulin N and TID Humulin R injections = 5 times daily.  Yes Jaime Álvarez MD       CareTeam (Including outside providers/suppliers regularly involved in providing care):   Patient Care Team:  JANICE Ross - CNP as PCP - General (Family Nurse Practitioner)  Jaime Álvarez MD as PCP - Empaneled Provider  Venkat Gant MD as Consulting Physician (Ophthalmology)  Agnieszka Velazquez MD as Consulting Physician (Ophthalmology)     Reviewed and updated this visit:  Tobacco  Allergies  Meds  Med Hx  Surg Hx  Soc Hx  Fam Hx             Lety Anita Enriquez APRN - CNP

## 2023-03-28 LAB
CREATININE URINE POCT: NORMAL
MICROALBUMIN/CREAT 24H UR: NORMAL MG/G{CREAT}
MICROALBUMIN/CREAT UR-RTO: NORMAL

## 2023-04-04 DIAGNOSIS — I10 ESSENTIAL HYPERTENSION: ICD-10-CM

## 2023-04-05 RX ORDER — TRIAMTERENE AND HYDROCHLOROTHIAZIDE 37.5; 25 MG/1; MG/1
1 TABLET ORAL DAILY
Qty: 90 TABLET | Refills: 1 | Status: SHIPPED | OUTPATIENT
Start: 2023-04-05

## 2023-05-05 ENCOUNTER — HOSPITAL ENCOUNTER (OUTPATIENT)
Dept: WOMENS IMAGING | Age: 73
Discharge: HOME | End: 2023-05-05
Payer: MEDICARE

## 2023-05-05 VITALS — BODY MASS INDEX: 52.25 KG/M2 | WEIGHT: 293 LBS

## 2023-05-05 DIAGNOSIS — Z12.31 ENCOUNTER FOR SCREENING MAMMOGRAM FOR MALIGNANT NEOPLASM OF BREAST: ICD-10-CM

## 2023-05-05 PROCEDURE — 77063 BREAST TOMOSYNTHESIS BI: CPT

## 2023-05-08 RX ORDER — TELMISARTAN 80 MG/1
80 TABLET ORAL DAILY
Qty: 90 TABLET | Refills: 0 | Status: SHIPPED | OUTPATIENT
Start: 2023-05-08

## 2023-05-16 RX ORDER — AMLODIPINE BESYLATE 5 MG/1
5 TABLET ORAL DAILY
Qty: 90 TABLET | Refills: 0 | Status: SHIPPED | OUTPATIENT
Start: 2023-05-16

## 2023-05-16 NOTE — TELEPHONE ENCOUNTER
This was sent to [facility] by mistake. Sorry for any delays.

## 2023-06-02 DIAGNOSIS — E11.9 TYPE 2 DIABETES MELLITUS WITHOUT COMPLICATION, WITH LONG-TERM CURRENT USE OF INSULIN (HCC): ICD-10-CM

## 2023-06-02 DIAGNOSIS — Z79.4 TYPE 2 DIABETES MELLITUS WITHOUT COMPLICATION, WITH LONG-TERM CURRENT USE OF INSULIN (HCC): ICD-10-CM

## 2023-06-05 DIAGNOSIS — E11.9 TYPE 2 DIABETES MELLITUS WITHOUT COMPLICATION, WITH LONG-TERM CURRENT USE OF INSULIN (HCC): ICD-10-CM

## 2023-06-05 DIAGNOSIS — Z79.4 TYPE 2 DIABETES MELLITUS WITHOUT COMPLICATION, WITH LONG-TERM CURRENT USE OF INSULIN (HCC): ICD-10-CM

## 2023-06-27 ENCOUNTER — OFFICE VISIT (OUTPATIENT)
Dept: FAMILY MEDICINE CLINIC | Age: 73
End: 2023-06-27

## 2023-06-27 VITALS
WEIGHT: 293 LBS | HEART RATE: 68 BPM | HEIGHT: 65 IN | OXYGEN SATURATION: 97 % | BODY MASS INDEX: 48.82 KG/M2 | DIASTOLIC BLOOD PRESSURE: 64 MMHG | SYSTOLIC BLOOD PRESSURE: 114 MMHG

## 2023-06-27 DIAGNOSIS — Z79.4 TYPE 2 DIABETES MELLITUS WITHOUT COMPLICATION, WITH LONG-TERM CURRENT USE OF INSULIN (HCC): Primary | ICD-10-CM

## 2023-06-27 DIAGNOSIS — N28.9 FUNCTION KIDNEY DECREASED: Primary | ICD-10-CM

## 2023-06-27 DIAGNOSIS — J30.9 ALLERGIC RHINITIS, UNSPECIFIED SEASONALITY, UNSPECIFIED TRIGGER: ICD-10-CM

## 2023-06-27 DIAGNOSIS — E11.9 TYPE 2 DIABETES MELLITUS WITHOUT COMPLICATION, WITH LONG-TERM CURRENT USE OF INSULIN (HCC): Primary | ICD-10-CM

## 2023-06-27 DIAGNOSIS — Z13.220 LIPID SCREENING: ICD-10-CM

## 2023-06-27 DIAGNOSIS — I10 ESSENTIAL HYPERTENSION: ICD-10-CM

## 2023-06-27 LAB
ALBUMIN SERPL-MCNC: 4 G/DL (ref 3.4–5)
ALBUMIN/GLOB SERPL: 1.5 {RATIO} (ref 1.1–2.2)
ALP SERPL-CCNC: 72 U/L (ref 40–129)
ALT SERPL-CCNC: 11 U/L (ref 10–40)
ANION GAP SERPL CALCULATED.3IONS-SCNC: 9 MMOL/L (ref 3–16)
AST SERPL-CCNC: 14 U/L (ref 15–37)
BILIRUB SERPL-MCNC: 0.5 MG/DL (ref 0–1)
BUN SERPL-MCNC: 29 MG/DL (ref 7–20)
CALCIUM SERPL-MCNC: 9.5 MG/DL (ref 8.3–10.6)
CHLORIDE SERPL-SCNC: 104 MMOL/L (ref 99–110)
CHOLEST SERPL-MCNC: 162 MG/DL (ref 0–199)
CO2 SERPL-SCNC: 27 MMOL/L (ref 21–32)
CREAT SERPL-MCNC: 1.2 MG/DL (ref 0.6–1.2)
GFR SERPLBLD CREATININE-BSD FMLA CKD-EPI: 48 ML/MIN/{1.73_M2}
GLUCOSE SERPL-MCNC: 144 MG/DL (ref 70–99)
HBA1C MFR BLD: 7.7 %
HDLC SERPL-MCNC: 56 MG/DL (ref 40–60)
LDL CHOLESTEROL CALCULATED: 91 MG/DL
POTASSIUM SERPL-SCNC: 5.1 MMOL/L (ref 3.5–5.1)
PROT SERPL-MCNC: 6.6 G/DL (ref 6.4–8.2)
SODIUM SERPL-SCNC: 140 MMOL/L (ref 136–145)
TRIGL SERPL-MCNC: 75 MG/DL (ref 0–150)
VLDLC SERPL CALC-MCNC: 15 MG/DL

## 2023-06-27 RX ORDER — TRIAMTERENE AND HYDROCHLOROTHIAZIDE 37.5; 25 MG/1; MG/1
1 TABLET ORAL DAILY
Qty: 90 TABLET | Refills: 1 | Status: SHIPPED | OUTPATIENT
Start: 2023-06-27

## 2023-06-27 RX ORDER — AMLODIPINE BESYLATE 5 MG/1
5 TABLET ORAL DAILY
Qty: 90 TABLET | Refills: 1 | Status: SHIPPED | OUTPATIENT
Start: 2023-06-27

## 2023-06-27 RX ORDER — TELMISARTAN 80 MG/1
80 TABLET ORAL DAILY
Qty: 90 TABLET | Refills: 1 | Status: SHIPPED | OUTPATIENT
Start: 2023-06-27

## 2023-06-27 RX ORDER — MONTELUKAST SODIUM 10 MG/1
10 TABLET ORAL NIGHTLY
Qty: 90 TABLET | Refills: 1 | Status: SHIPPED | OUTPATIENT
Start: 2023-06-27

## 2023-06-27 RX ORDER — KETOTIFEN FUMARATE 0.35 MG/ML
1 SOLUTION/ DROPS OPHTHALMIC 2 TIMES DAILY
Qty: 1 ML | Refills: 2 | Status: SHIPPED | OUTPATIENT
Start: 2023-06-27 | End: 2023-07-07

## 2023-06-27 RX ORDER — POTASSIUM CHLORIDE 750 MG/1
10 TABLET, EXTENDED RELEASE ORAL EVERY OTHER DAY
Qty: 45 TABLET | Refills: 1 | Status: SHIPPED | OUTPATIENT
Start: 2023-06-27

## 2023-08-10 ENCOUNTER — OFFICE VISIT (OUTPATIENT)
Dept: FAMILY MEDICINE CLINIC | Age: 73
End: 2023-08-10

## 2023-08-10 VITALS
DIASTOLIC BLOOD PRESSURE: 76 MMHG | HEART RATE: 82 BPM | SYSTOLIC BLOOD PRESSURE: 130 MMHG | RESPIRATION RATE: 20 BRPM | BODY MASS INDEX: 48.82 KG/M2 | WEIGHT: 293 LBS | HEIGHT: 65 IN

## 2023-08-10 DIAGNOSIS — Z87.2 HISTORY OF SEBACEOUS CYST: Primary | ICD-10-CM

## 2023-08-10 ASSESSMENT — ENCOUNTER SYMPTOMS
SHORTNESS OF BREATH: 0
ROS SKIN COMMENTS: LUMP
COUGH: 0

## 2023-08-10 NOTE — PROGRESS NOTES
Efra Baez (:  1950) is a 72 y.o. female,Established patient, here for evaluation of the following chief complaint(s): Other (BUMP ON SHOULDER X2 MONTHS SEEMS TO HAVE CHANGED IN SIZE CAUSING PAIN IN ARM DOWN TO ELBOW ANNOYING TO LAY ON, NO INJURY KNOWN, LUMP IS HARD AND MOVES )       ASSESSMENT/PLAN:  1. History of sebaceous cyst  -     Wendy Ramirez MD, General Surgery, [facility]      Return if symptoms worsen or fail to improve. Subjective   SUBJECTIVE/OBJECTIVE:  Lump on the left shoulder/clavicle causing left arm pain- dull/ache  No numbness and tingling   No fever, no chills, no sweats  No pus  Noticed more than 6 months ago, it has gotten bigger in the last 2-3 months  Green alcohol on it, helps with the soreness      Review of Systems   Constitutional:  Negative for activity change, appetite change, chills, diaphoresis, fatigue and fever. HENT:  Negative for congestion. Respiratory:  Negative for cough and shortness of breath. Skin:         lump        Objective   Physical Exam  Vitals reviewed. Constitutional:       Appearance: She is obese. HENT:      Head: Normocephalic. Right Ear: External ear normal.      Left Ear: External ear normal.      Nose: Nose normal.      Mouth/Throat:      Mouth: Mucous membranes are moist.      Pharynx: Oropharynx is clear. Eyes:      Extraocular Movements: Extraocular movements intact. Conjunctiva/sclera: Conjunctivae normal.      Pupils: Pupils are equal, round, and reactive to light. Cardiovascular:      Rate and Rhythm: Normal rate and regular rhythm. Pulses: Normal pulses. Heart sounds: Normal heart sounds. Pulmonary:      Effort: Pulmonary effort is normal.      Breath sounds: Normal breath sounds. Musculoskeletal:         General: Normal range of motion. Cervical back: Normal range of motion. Skin:     General: Skin is warm.       Capillary Refill: Capillary refill takes less than

## 2023-08-24 ENCOUNTER — OFFICE VISIT (OUTPATIENT)
Dept: SURGERY | Age: 73
End: 2023-08-24
Payer: MEDICARE

## 2023-08-24 VITALS
SYSTOLIC BLOOD PRESSURE: 124 MMHG | HEIGHT: 65 IN | DIASTOLIC BLOOD PRESSURE: 76 MMHG | WEIGHT: 293 LBS | BODY MASS INDEX: 48.82 KG/M2

## 2023-08-24 DIAGNOSIS — M25.512 CHRONIC LEFT SHOULDER PAIN: Primary | ICD-10-CM

## 2023-08-24 DIAGNOSIS — G89.29 CHRONIC LEFT SHOULDER PAIN: Primary | ICD-10-CM

## 2023-08-24 PROCEDURE — 3078F DIAST BP <80 MM HG: CPT | Performed by: SURGERY

## 2023-08-24 PROCEDURE — 1123F ACP DISCUSS/DSCN MKR DOCD: CPT | Performed by: SURGERY

## 2023-08-24 PROCEDURE — 99202 OFFICE O/P NEW SF 15 MIN: CPT | Performed by: SURGERY

## 2023-08-24 PROCEDURE — 3074F SYST BP LT 130 MM HG: CPT | Performed by: SURGERY

## 2023-08-24 ASSESSMENT — ENCOUNTER SYMPTOMS
CHEST TIGHTNESS: 0
COLOR CHANGE: 0
APNEA: 0
BACK PAIN: 0
EYE DISCHARGE: 0
ABDOMINAL PAIN: 0
ABDOMINAL DISTENTION: 0
EYE ITCHING: 0

## 2023-08-24 NOTE — PROGRESS NOTES
Leontine Kanner Cage is a 72 y.o. female     CC: Left shoulder mass    HPI: 72-year-old female who presents for evaluation of a left shoulder mass. It was first noted about 6 months ago. Over the last 1 month, it began causing symptoms of left shoulder and arm pain. No history of trauma. The pain is worse with certain movements. Past Medical History:   Diagnosis Date    Cataract     Diabetic retinopathy ([address])     High blood pressure     Proliferative diabetic retinopathy, right eye ([address])        Cephalexin and Losartan     Past Surgical History:   Procedure Laterality Date    CARPAL TUNNEL RELEASE  1999,2000    Bilateral    CATARACT REMOVAL WITH IMPLANT      [facility]    COLONOSCOPY  02/2022    Dr. Zia Coulter    COLONOSCOPY N/A 02/28/2022    COLONOSCOPY performed by Mike Escobedo MD at [address] Left 03/08/2018    left ring finger    HAND SURGERY  2002, 2008    Left x5    HYSTERECTOMY (CERVIX STATUS UNKNOWN) Right 1998    x1 ovary removed    SHOULDER SURGERY  2005    Right        Prior to Visit Medications    Medication Sig Taking?  Authorizing Provider   montelukast (SINGULAIR) 10 MG tablet Take 1 tablet by mouth nightly Yes JANICE Chino CNP   triamterene-hydroCHLOROthiazide (MAXZIDE-25) 37.5-25 MG per tablet Take 1 tablet by mouth daily Yes JANICE Chino CNP   telmisartan (MICARDIS) 80 MG tablet Take 1 tablet by mouth daily Yes JANICE Way CNP   amLODIPine (NORVASC) 5 MG tablet Take 1 tablet by mouth daily Yes JANICE Hein CNP   potassium chloride (KLOR-CON M) 10 MEQ extended release tablet Take 1 tablet by mouth every other day Yes JANICE Hein CNP   metFORMIN (GLUCOPHAGE) 1000 MG tablet 1 tab at breakfast 0.5 tab at dinner Yes JANICE Hein CNP   insulin regular (HUMULIN R) 100 UNIT/ML injection Inject 20

## 2023-08-28 ENCOUNTER — HOSPITAL ENCOUNTER (OUTPATIENT)
Dept: ULTRASOUND IMAGING | Age: 73
Discharge: HOME | End: 2023-08-28
Attending: SURGERY
Payer: MEDICARE

## 2023-08-28 ENCOUNTER — HOSPITAL ENCOUNTER (OUTPATIENT)
Dept: GENERAL RADIOLOGY | Age: 73
Discharge: HOME | End: 2023-08-28
Attending: SURGERY
Payer: MEDICARE

## 2023-08-28 DIAGNOSIS — G89.29 CHRONIC LEFT SHOULDER PAIN: ICD-10-CM

## 2023-08-28 DIAGNOSIS — M25.512 CHRONIC LEFT SHOULDER PAIN: ICD-10-CM

## 2023-08-28 PROCEDURE — 76999 ECHO EXAMINATION PROCEDURE: CPT

## 2023-08-28 PROCEDURE — 73030 X-RAY EXAM OF SHOULDER: CPT

## 2023-09-05 ENCOUNTER — OFFICE VISIT (OUTPATIENT)
Dept: ORTHOPEDIC SURGERY | Age: 73
End: 2023-09-05

## 2023-09-05 VITALS — BODY MASS INDEX: 48.82 KG/M2 | HEIGHT: 65 IN | WEIGHT: 293 LBS

## 2023-09-05 DIAGNOSIS — M25.469 KNEE SWELLING: Primary | ICD-10-CM

## 2023-09-14 NOTE — PROGRESS NOTES
2023     Reason for visit:  Left shoulder pain    History of Present Illness: The patient is a 72-year-old female who presents for evaluation of her left shoulder. She reports only mild pain that does not overall interfere with her daily life or activities. Left shoulder clean the top of the deep and upper arm area. It does bother her with certain movements and activities.     Medical History:  Past Medical History:   Diagnosis Date    Cataract     Diabetic retinopathy ([address])     High blood pressure     Proliferative diabetic retinopathy, right eye [facility])       Past Surgical History:   Procedure Laterality Date    CARPAL TUNNEL RELEASE  ,    Bilateral    CATARACT REMOVAL WITH IMPLANT      [facility]    COLONOSCOPY  2022    Dr. Yohan Ty    COLONOSCOPY N/A 2022    COLONOSCOPY performed by Ramya Pete MD at [address] [facility] Left 2018    left ring finger    HAND SURGERY  ,     Left x5    HYSTERECTOMY (CERVIX STATUS UNKNOWN) Right 1998    x1 ovary removed    SHOULDER SURGERY      Right      Family History   Problem Relation Age of Onset    Heart Disease Mother     Diabetes Mother     Stroke Mother     Heart Failure Mother     Diabetes Sister     Heart Disease Sister         pacemaker and deflibulator    Stroke Sister     Heart Attack Brother     Diabetes Brother     Breast Cancer Neg Hx     Ovarian Cancer Neg Hx       Social History     Socioeconomic History    Marital status: Single     Spouse name: Not on file    Number of children: 2    Years of education: Not on file    Highest education level: Not on file   Occupational History    Not on file   Tobacco Use    Smoking status: Former     Packs/day: 0.50     Years: 20.00     Additional pack years: 0.00     Total pack years: 10.00     Types: Cigarettes     Quit date: 3/1/1996     Years since quittin.5    Smokeless tobacco: Never

## 2023-09-15 DIAGNOSIS — E11.9 TYPE 2 DIABETES MELLITUS WITHOUT COMPLICATION, WITH LONG-TERM CURRENT USE OF INSULIN (HCC): ICD-10-CM

## 2023-09-15 DIAGNOSIS — Z79.4 TYPE 2 DIABETES MELLITUS WITHOUT COMPLICATION, WITH LONG-TERM CURRENT USE OF INSULIN (HCC): ICD-10-CM

## 2023-09-21 ENCOUNTER — OFFICE VISIT (OUTPATIENT)
Dept: FAMILY MEDICINE CLINIC | Age: 73
End: 2023-09-21

## 2023-09-21 VITALS
OXYGEN SATURATION: 100 % | DIASTOLIC BLOOD PRESSURE: 86 MMHG | HEART RATE: 84 BPM | WEIGHT: 293 LBS | BODY MASS INDEX: 48.82 KG/M2 | SYSTOLIC BLOOD PRESSURE: 122 MMHG | HEIGHT: 65 IN

## 2023-09-21 DIAGNOSIS — R35.0 URINARY FREQUENCY: Primary | ICD-10-CM

## 2023-09-21 DIAGNOSIS — N32.81 OAB (OVERACTIVE BLADDER): ICD-10-CM

## 2023-09-21 DIAGNOSIS — N28.9 FUNCTION KIDNEY DECREASED: ICD-10-CM

## 2023-09-21 LAB
BILIRUBIN, POC: NORMAL
BLOOD URINE, POC: NORMAL
CLARITY, POC: CLEAR
COLOR, POC: YELLOW
GLUCOSE URINE, POC: NORMAL
KETONES, POC: NORMAL
LEUKOCYTE EST, POC: NORMAL
NITRITE, POC: NORMAL
PH, POC: 6
PROTEIN, POC: NORMAL
SPECIFIC GRAVITY, POC: 1.02
UROBILINOGEN, POC: 0.2

## 2023-09-21 RX ORDER — OXYBUTYNIN CHLORIDE 5 MG/1
5 TABLET ORAL 3 TIMES DAILY
Qty: 90 TABLET | Refills: 3 | Status: ON HOLD | OUTPATIENT
Start: 2023-09-21

## 2023-09-21 NOTE — PROGRESS NOTES
Huma Baez (:  1950) is a 72 y.o. female,Established patient, here for evaluation of the following chief complaint(s):    Chief Complaint   Patient presents with    Urinary Frequency     C/o  urinary frequency for the last three weeks- getting worse      SUBJECTIVE/OBJECTIVE:  HPI  Mrs Huma Godoy c/o urinary frequency for the last three weeks  she stats it is worse at night getting up at least five times - feels like she does not mpty her bladder all the way - occurs about the same in the am - she only drinks one cup of coffee daily- denies burning  - blood  flank pain - she has always on the water pill and this  has been an issue  Review of Systems   Endocrine: Negative. Genitourinary:  Positive for frequency. Negative for dysuria. Physical Exam  Vitals reviewed. Constitutional:       General: She is awake. She is not in acute distress. Appearance: Normal appearance. She is well-developed and well-groomed. She is morbidly obese. She is not ill-appearing, toxic-appearing or diaphoretic. Abdominal:      General: Bowel sounds are normal.      Palpations: Abdomen is soft. Tenderness: There is no abdominal tenderness. Neurological:      Mental Status: She is alert and oriented to person, place, and time. Psychiatric:         Attention and Perception: Attention and perception normal.         Mood and Affect: Mood and affect normal.         Speech: Speech normal.         Behavior: Behavior normal. Behavior is cooperative. Thought Content: Thought content normal.         Cognition and Memory: Cognition and memory normal.         Judgment: Judgment normal.         Huma Godoy was seen today for urinary frequency. Diagnoses and all orders for this visit:    Urinary frequency  OAB (overactive bladder)  -     oxybutynin (DITROPAN) 5 MG tablet;  Take 1 tablet by mouth 3 times daily se dw pt  POCT Urinalysis no Micro  Culture, Urine  Encouraged to drink more water at least 80 to 100

## 2023-09-22 ENCOUNTER — APPOINTMENT (OUTPATIENT)
Dept: CT IMAGING | Age: 73
End: 2023-09-22
Payer: MEDICARE

## 2023-09-22 ENCOUNTER — APPOINTMENT (OUTPATIENT)
Dept: GENERAL RADIOLOGY | Age: 73
End: 2023-09-22
Payer: MEDICARE

## 2023-09-22 ENCOUNTER — HOSPITAL ENCOUNTER (EMERGENCY)
Age: 73
Discharge: ANOTHER HOSPITAL | End: 2023-09-23
Attending: EMERGENCY MEDICINE
Payer: MEDICARE

## 2023-09-22 DIAGNOSIS — R41.82 ALTERED MENTAL STATUS, UNSPECIFIED ALTERED MENTAL STATUS TYPE: ICD-10-CM

## 2023-09-22 DIAGNOSIS — J96.01 ACUTE RESPIRATORY FAILURE WITH HYPOXIA (HCC): ICD-10-CM

## 2023-09-22 DIAGNOSIS — I63.9 CEREBROVASCULAR ACCIDENT (CVA), UNSPECIFIED MECHANISM (HCC): Primary | ICD-10-CM

## 2023-09-22 DIAGNOSIS — R79.89 D-DIMER, ELEVATED: ICD-10-CM

## 2023-09-22 DIAGNOSIS — N17.9 AKI (ACUTE KIDNEY INJURY) (HCC): ICD-10-CM

## 2023-09-22 DIAGNOSIS — I10 ESSENTIAL HYPERTENSION: ICD-10-CM

## 2023-09-22 LAB
ALBUMIN SERPL-MCNC: 4.5 G/DL (ref 3.4–5)
ALBUMIN/GLOB SERPL: 1.3 {RATIO} (ref 1.1–2.2)
ALP SERPL-CCNC: 88 U/L (ref 40–129)
ALT SERPL-CCNC: 14 U/L (ref 10–40)
AMMONIA PLAS-SCNC: 25 UMOL/L (ref 11–51)
AMPHETAMINES UR QL SCN>1000 NG/ML: NORMAL
ANION GAP SERPL CALCULATED.3IONS-SCNC: 10 MMOL/L (ref 3–16)
ANION GAP SERPL CALCULATED.3IONS-SCNC: 8 MMOL/L (ref 3–16)
AST SERPL-CCNC: 17 U/L (ref 15–37)
BARBITURATES UR QL SCN>200 NG/ML: NORMAL
BASE EXCESS BLDV CALC-SCNC: -0.2 MMOL/L (ref -3–3)
BASOPHILS # BLD: 0 K/UL (ref 0–0.2)
BASOPHILS NFR BLD: 0.5 %
BENZODIAZ UR QL SCN>200 NG/ML: NORMAL
BILIRUB SERPL-MCNC: 0.3 MG/DL (ref 0–1)
BILIRUB UR QL STRIP.AUTO: NEGATIVE
BUN SERPL-MCNC: 22 MG/DL (ref 7–20)
BUN SERPL-MCNC: 24 MG/DL (ref 7–20)
CALCIUM SERPL-MCNC: 9.4 MG/DL (ref 8.3–10.6)
CALCIUM SERPL-MCNC: 9.9 MG/DL (ref 8.3–10.6)
CANNABINOIDS UR QL SCN>50 NG/ML: NORMAL
CHLORIDE SERPL-SCNC: 101 MMOL/L (ref 99–110)
CHLORIDE SERPL-SCNC: 103 MMOL/L (ref 99–110)
CLARITY UR: CLEAR
CO2 BLDV-SCNC: 63 MMOL/L
CO2 SERPL-SCNC: 27 MMOL/L (ref 21–32)
CO2 SERPL-SCNC: 28 MMOL/L (ref 21–32)
COCAINE UR QL SCN: NORMAL
COHGB MFR BLDV: 2.7 % (ref 0–1.5)
COLOR UR: YELLOW
CREAT SERPL-MCNC: 1.1 MG/DL (ref 0.6–1.2)
CREAT SERPL-MCNC: 1.3 MG/DL (ref 0.6–1.2)
D DIMER: 1.39 UG/ML FEU (ref 0–0.6)
DEPRECATED RDW RBC AUTO: 15.5 % (ref 12.4–15.4)
DRUG SCREEN COMMENT UR-IMP: NORMAL
EOSINOPHIL # BLD: 0.2 K/UL (ref 0–0.6)
EOSINOPHIL NFR BLD: 3.8 %
ETHANOLAMINE SERPL-MCNC: NORMAL MG/DL (ref 0–0.08)
FENTANYL SCREEN, URINE: NORMAL
GFR SERPLBLD CREATININE-BSD FMLA CKD-EPI: 44 ML/MIN/{1.73_M2}
GFR SERPLBLD CREATININE-BSD FMLA CKD-EPI: 54 ML/MIN/{1.73_M2}
GLUCOSE BLD-MCNC: 145 MG/DL (ref 70–99)
GLUCOSE SERPL-MCNC: 136 MG/DL (ref 70–99)
GLUCOSE SERPL-MCNC: 164 MG/DL (ref 70–99)
GLUCOSE UR STRIP.AUTO-MCNC: NEGATIVE MG/DL
HCO3 BLDV-SCNC: 26.6 MMOL/L (ref 23–29)
HCT VFR BLD AUTO: 36.7 % (ref 36–48)
HGB BLD-MCNC: 11.9 G/DL (ref 12–16)
HGB UR QL STRIP.AUTO: NEGATIVE
INR PPP: 0.98 (ref 0.84–1.16)
KETONES UR STRIP.AUTO-MCNC: NEGATIVE MG/DL
LEUKOCYTE ESTERASE UR QL STRIP.AUTO: NEGATIVE
LYMPHOCYTES # BLD: 1.3 K/UL (ref 1–5.1)
LYMPHOCYTES NFR BLD: 19.8 %
MCH RBC QN AUTO: 28.9 PG (ref 26–34)
MCHC RBC AUTO-ENTMCNC: 32.5 G/DL (ref 31–36)
MCV RBC AUTO: 88.8 FL (ref 80–100)
METHADONE UR QL SCN>300 NG/ML: NORMAL
METHGB MFR BLDV: 0.1 %
MONOCYTES # BLD: 0.6 K/UL (ref 0–1.3)
MONOCYTES NFR BLD: 9.5 %
NEUTROPHILS # BLD: 4.3 K/UL (ref 1.7–7.7)
NEUTROPHILS NFR BLD: 66.4 %
NITRITE UR QL STRIP.AUTO: NEGATIVE
NT-PROBNP SERPL-MCNC: 51 PG/ML (ref 0–124)
O2 CT VFR BLDV CALC: 16 VOL %
O2 THERAPY: ABNORMAL
OPIATES UR QL SCN>300 NG/ML: NORMAL
OXYCODONE UR QL SCN: NORMAL
PCO2 BLDV: 52.2 MMHG (ref 40–50)
PCP UR QL SCN>25 NG/ML: NORMAL
PERFORMED ON: ABNORMAL
PH BLDV: 7.32 [PH] (ref 7.35–7.45)
PH UR STRIP.AUTO: 5 [PH] (ref 5–8)
PH UR STRIP: 5 [PH]
PLATELET # BLD AUTO: 256 K/UL (ref 135–450)
PMV BLD AUTO: 9.2 FL (ref 5–10.5)
PO2 BLDV: 117 MMHG (ref 25–40)
POTASSIUM SERPL-SCNC: 4.6 MMOL/L (ref 3.5–5.1)
POTASSIUM SERPL-SCNC: 5.1 MMOL/L (ref 3.5–5.1)
PROT SERPL-MCNC: 8 G/DL (ref 6.4–8.2)
PROT UR STRIP.AUTO-MCNC: NEGATIVE MG/DL
PROTHROMBIN TIME: 13 SEC (ref 11.5–14.8)
RBC # BLD AUTO: 4.13 M/UL (ref 4–5.2)
SAO2 % BLDV: 99 %
SARS-COV-2 RDRP RESP QL NAA+PROBE: NOT DETECTED
SODIUM SERPL-SCNC: 138 MMOL/L (ref 136–145)
SODIUM SERPL-SCNC: 139 MMOL/L (ref 136–145)
SP GR UR STRIP.AUTO: >=1.03 (ref 1–1.03)
TROPONIN, HIGH SENSITIVITY: 14 NG/L (ref 0–14)
UA COMPLETE W REFLEX CULTURE PNL UR: NORMAL
UA DIPSTICK W REFLEX MICRO PNL UR: NORMAL
URN SPEC COLLECT METH UR: NORMAL
UROBILINOGEN UR STRIP-ACNC: 0.2 E.U./DL
WBC # BLD AUTO: 6.5 K/UL (ref 4–11)

## 2023-09-22 PROCEDURE — 70450 CT HEAD/BRAIN W/O DYE: CPT

## 2023-09-22 PROCEDURE — 93005 ELECTROCARDIOGRAM TRACING: CPT | Performed by: EMERGENCY MEDICINE

## 2023-09-22 PROCEDURE — 84484 ASSAY OF TROPONIN QUANT: CPT

## 2023-09-22 PROCEDURE — 70498 CT ANGIOGRAPHY NECK: CPT

## 2023-09-22 PROCEDURE — 83880 ASSAY OF NATRIURETIC PEPTIDE: CPT

## 2023-09-22 PROCEDURE — 87635 SARS-COV-2 COVID-19 AMP PRB: CPT

## 2023-09-22 PROCEDURE — 96375 TX/PRO/DX INJ NEW DRUG ADDON: CPT

## 2023-09-22 PROCEDURE — 36415 COLL VENOUS BLD VENIPUNCTURE: CPT

## 2023-09-22 PROCEDURE — 80053 COMPREHEN METABOLIC PANEL: CPT

## 2023-09-22 PROCEDURE — 6360000004 HC RX CONTRAST MEDICATION: Performed by: EMERGENCY MEDICINE

## 2023-09-22 PROCEDURE — 82803 BLOOD GASES ANY COMBINATION: CPT

## 2023-09-22 PROCEDURE — 6360000002 HC RX W HCPCS: Performed by: EMERGENCY MEDICINE

## 2023-09-22 PROCEDURE — 82140 ASSAY OF AMMONIA: CPT

## 2023-09-22 PROCEDURE — 85379 FIBRIN DEGRADATION QUANT: CPT

## 2023-09-22 PROCEDURE — 81003 URINALYSIS AUTO W/O SCOPE: CPT

## 2023-09-22 PROCEDURE — 80307 DRUG TEST PRSMV CHEM ANLYZR: CPT

## 2023-09-22 PROCEDURE — 51701 INSERT BLADDER CATHETER: CPT

## 2023-09-22 PROCEDURE — 85025 COMPLETE CBC W/AUTO DIFF WBC: CPT

## 2023-09-22 PROCEDURE — 85610 PROTHROMBIN TIME: CPT

## 2023-09-22 PROCEDURE — 82077 ASSAY SPEC XCP UR&BREATH IA: CPT

## 2023-09-22 PROCEDURE — 71045 X-RAY EXAM CHEST 1 VIEW: CPT

## 2023-09-22 RX ORDER — FUROSEMIDE 10 MG/ML
40 INJECTION INTRAMUSCULAR; INTRAVENOUS ONCE
Status: COMPLETED | OUTPATIENT
Start: 2023-09-22 | End: 2023-09-22

## 2023-09-22 RX ORDER — LABETALOL HYDROCHLORIDE 5 MG/ML
20 INJECTION, SOLUTION INTRAVENOUS ONCE
Status: COMPLETED | OUTPATIENT
Start: 2023-09-22 | End: 2023-09-22

## 2023-09-22 RX ORDER — HYDRALAZINE HYDROCHLORIDE 20 MG/ML
20 INJECTION INTRAMUSCULAR; INTRAVENOUS ONCE
Status: DISCONTINUED | OUTPATIENT
Start: 2023-09-23 | End: 2023-09-23

## 2023-09-22 RX ADMIN — LABETALOL HYDROCHLORIDE 20 MG: 5 INJECTION INTRAVENOUS at 22:29

## 2023-09-22 RX ADMIN — IOPAMIDOL 75 ML: 755 INJECTION, SOLUTION INTRAVENOUS at 21:46

## 2023-09-22 RX ADMIN — FUROSEMIDE 40 MG: 10 INJECTION, SOLUTION INTRAMUSCULAR; INTRAVENOUS at 22:29

## 2023-09-23 ENCOUNTER — HOSPITAL ENCOUNTER (INPATIENT)
Age: 73
LOS: 6 days | Discharge: HOME | End: 2023-09-29
Attending: INTERNAL MEDICINE | Admitting: INTERNAL MEDICINE
Payer: MEDICARE

## 2023-09-23 ENCOUNTER — APPOINTMENT (OUTPATIENT)
Dept: MRI IMAGING | Age: 73
End: 2023-09-23
Attending: INTERNAL MEDICINE
Payer: MEDICARE

## 2023-09-23 ENCOUNTER — APPOINTMENT (OUTPATIENT)
Dept: CT IMAGING | Age: 73
End: 2023-09-23
Payer: MEDICARE

## 2023-09-23 VITALS
HEART RATE: 87 BPM | OXYGEN SATURATION: 100 % | SYSTOLIC BLOOD PRESSURE: 120 MMHG | DIASTOLIC BLOOD PRESSURE: 63 MMHG | TEMPERATURE: 98.9 F | RESPIRATION RATE: 15 BRPM

## 2023-09-23 PROBLEM — I63.412 CEREBROVASCULAR ACCIDENT (CVA) DUE TO EMBOLISM OF LEFT MIDDLE CEREBRAL ARTERY (HCC): Status: ACTIVE | Noted: 2023-09-23

## 2023-09-23 PROBLEM — I63.9 STROKE DETERMINED BY CLINICAL ASSESSMENT (HCC): Status: ACTIVE | Noted: 2023-09-23

## 2023-09-23 PROBLEM — R41.82 AMS (ALTERED MENTAL STATUS): Status: ACTIVE | Noted: 2023-09-23

## 2023-09-23 LAB
ANION GAP SERPL CALCULATED.3IONS-SCNC: 10 MMOL/L (ref 3–16)
BACTERIA UR CULT: NORMAL
BUN SERPL-MCNC: 24 MG/DL (ref 7–20)
CALCIUM SERPL-MCNC: 9.6 MG/DL (ref 8.3–10.6)
CHLORIDE SERPL-SCNC: 97 MMOL/L (ref 99–110)
CHOLEST SERPL-MCNC: 159 MG/DL (ref 0–199)
CO2 SERPL-SCNC: 27 MMOL/L (ref 21–32)
CREAT SERPL-MCNC: 1.2 MG/DL (ref 0.6–1.2)
EKG ATRIAL RATE: 110 BPM
EKG DIAGNOSIS: NORMAL
EKG P AXIS: 40 DEGREES
EKG P-R INTERVAL: 176 MS
EKG Q-T INTERVAL: 344 MS
EKG QRS DURATION: 102 MS
EKG QTC CALCULATION (BAZETT): 465 MS
EKG R AXIS: -47 DEGREES
EKG T AXIS: 60 DEGREES
EKG VENTRICULAR RATE: 110 BPM
GFR SERPLBLD CREATININE-BSD FMLA CKD-EPI: 48 ML/MIN/{1.73_M2}
GLUCOSE BLD-MCNC: 210 MG/DL (ref 70–99)
GLUCOSE BLD-MCNC: 242 MG/DL (ref 70–99)
GLUCOSE BLD-MCNC: 261 MG/DL (ref 70–99)
GLUCOSE BLD-MCNC: 277 MG/DL (ref 70–99)
GLUCOSE BLD-MCNC: 305 MG/DL (ref 70–99)
GLUCOSE BLD-MCNC: 332 MG/DL (ref 70–99)
GLUCOSE SERPL-MCNC: 295 MG/DL (ref 70–99)
HDLC SERPL-MCNC: 68 MG/DL (ref 40–60)
LDLC SERPL CALC-MCNC: 80 MG/DL
PERFORMED ON: ABNORMAL
POTASSIUM SERPL-SCNC: 4.9 MMOL/L (ref 3.5–5.1)
SODIUM SERPL-SCNC: 134 MMOL/L (ref 136–145)
TRIGL SERPL-MCNC: 55 MG/DL (ref 0–150)
VLDLC SERPL CALC-MCNC: 11 MG/DL

## 2023-09-23 PROCEDURE — 99285 EMERGENCY DEPT VISIT HI MDM: CPT

## 2023-09-23 PROCEDURE — 80048 BASIC METABOLIC PNL TOTAL CA: CPT

## 2023-09-23 PROCEDURE — 6370000000 HC RX 637 (ALT 250 FOR IP): Performed by: STUDENT IN AN ORGANIZED HEALTH CARE EDUCATION/TRAINING PROGRAM

## 2023-09-23 PROCEDURE — 70450 CT HEAD/BRAIN W/O DYE: CPT

## 2023-09-23 PROCEDURE — 6360000002 HC RX W HCPCS: Performed by: EMERGENCY MEDICINE

## 2023-09-23 PROCEDURE — 2580000003 HC RX 258

## 2023-09-23 PROCEDURE — 92523 SPEECH SOUND LANG COMPREHEN: CPT

## 2023-09-23 PROCEDURE — 1200000000 HC SEMI PRIVATE

## 2023-09-23 PROCEDURE — 6360000004 HC RX CONTRAST MEDICATION: Performed by: INTERNAL MEDICINE

## 2023-09-23 PROCEDURE — 6370000000 HC RX 637 (ALT 250 FOR IP)

## 2023-09-23 PROCEDURE — 71260 CT THORAX DX C+: CPT

## 2023-09-23 PROCEDURE — 70551 MRI BRAIN STEM W/O DYE: CPT

## 2023-09-23 PROCEDURE — 97530 THERAPEUTIC ACTIVITIES: CPT

## 2023-09-23 PROCEDURE — APPNB60 APP NON BILLABLE TIME 46-60 MINS

## 2023-09-23 PROCEDURE — 83036 HEMOGLOBIN GLYCOSYLATED A1C: CPT

## 2023-09-23 PROCEDURE — 99223 1ST HOSP IP/OBS HIGH 75: CPT | Performed by: PSYCHIATRY & NEUROLOGY

## 2023-09-23 PROCEDURE — 92610 EVALUATE SWALLOWING FUNCTION: CPT

## 2023-09-23 PROCEDURE — 97165 OT EVAL LOW COMPLEX 30 MIN: CPT

## 2023-09-23 PROCEDURE — 96375 TX/PRO/DX INJ NEW DRUG ADDON: CPT

## 2023-09-23 PROCEDURE — C8929 TTE W OR WO FOL WCON,DOPPLER: HCPCS

## 2023-09-23 PROCEDURE — 99222 1ST HOSP IP/OBS MODERATE 55: CPT | Performed by: INTERNAL MEDICINE

## 2023-09-23 PROCEDURE — 97116 GAIT TRAINING THERAPY: CPT

## 2023-09-23 PROCEDURE — 93010 ELECTROCARDIOGRAM REPORT: CPT | Performed by: INTERNAL MEDICINE

## 2023-09-23 PROCEDURE — 97535 SELF CARE MNGMENT TRAINING: CPT

## 2023-09-23 PROCEDURE — 96365 THER/PROPH/DIAG IV INF INIT: CPT

## 2023-09-23 PROCEDURE — 80061 LIPID PANEL: CPT

## 2023-09-23 PROCEDURE — 36415 COLL VENOUS BLD VENIPUNCTURE: CPT

## 2023-09-23 PROCEDURE — 97162 PT EVAL MOD COMPLEX 30 MIN: CPT

## 2023-09-23 RX ORDER — DEXTROSE MONOHYDRATE 100 MG/ML
INJECTION, SOLUTION INTRAVENOUS CONTINUOUS PRN
Status: DISCONTINUED | OUTPATIENT
Start: 2023-09-23 | End: 2023-09-29 | Stop reason: HOSPADM

## 2023-09-23 RX ORDER — LABETALOL HYDROCHLORIDE 5 MG/ML
10 INJECTION, SOLUTION INTRAVENOUS EVERY 10 MIN PRN
Status: DISCONTINUED | OUTPATIENT
Start: 2023-09-23 | End: 2023-09-29 | Stop reason: HOSPADM

## 2023-09-23 RX ORDER — INSULIN LISPRO 100 [IU]/ML
0-4 INJECTION, SOLUTION INTRAVENOUS; SUBCUTANEOUS
Status: DISCONTINUED | OUTPATIENT
Start: 2023-09-23 | End: 2023-09-23

## 2023-09-23 RX ORDER — SODIUM CHLORIDE 0.9 % (FLUSH) 0.9 %
5-40 SYRINGE (ML) INJECTION PRN
Status: DISCONTINUED | OUTPATIENT
Start: 2023-09-23 | End: 2023-09-29 | Stop reason: HOSPADM

## 2023-09-23 RX ORDER — SODIUM CHLORIDE, SODIUM LACTATE, POTASSIUM CHLORIDE, CALCIUM CHLORIDE 600; 310; 30; 20 MG/100ML; MG/100ML; MG/100ML; MG/100ML
INJECTION, SOLUTION INTRAVENOUS CONTINUOUS
Status: DISCONTINUED | OUTPATIENT
Start: 2023-09-23 | End: 2023-09-23

## 2023-09-23 RX ORDER — TRIAMTERENE AND HYDROCHLOROTHIAZIDE 37.5; 25 MG/1; MG/1
1 TABLET ORAL DAILY
Status: DISCONTINUED | OUTPATIENT
Start: 2023-09-23 | End: 2023-09-25

## 2023-09-23 RX ORDER — AMLODIPINE BESYLATE 5 MG/1
5 TABLET ORAL DAILY
Status: DISCONTINUED | OUTPATIENT
Start: 2023-09-23 | End: 2023-09-25

## 2023-09-23 RX ORDER — ACETAMINOPHEN 325 MG/1
650 TABLET ORAL EVERY 6 HOURS PRN
Status: DISCONTINUED | OUTPATIENT
Start: 2023-09-23 | End: 2023-09-29 | Stop reason: HOSPADM

## 2023-09-23 RX ORDER — ASPIRIN 81 MG/1
81 TABLET, CHEWABLE ORAL DAILY
Status: DISCONTINUED | OUTPATIENT
Start: 2023-09-24 | End: 2023-09-28

## 2023-09-23 RX ORDER — MANNITOL 20 G/100ML
INJECTION, SOLUTION INTRAVENOUS
Status: DISCONTINUED
Start: 2023-09-23 | End: 2023-09-23

## 2023-09-23 RX ORDER — INSULIN LISPRO 100 [IU]/ML
10 INJECTION, SOLUTION INTRAVENOUS; SUBCUTANEOUS ONCE
Status: COMPLETED | OUTPATIENT
Start: 2023-09-23 | End: 2023-09-23

## 2023-09-23 RX ORDER — ATORVASTATIN CALCIUM 80 MG/1
80 TABLET, FILM COATED ORAL NIGHTLY
Status: DISCONTINUED | OUTPATIENT
Start: 2023-09-23 | End: 2023-09-29 | Stop reason: HOSPADM

## 2023-09-23 RX ORDER — LEVETIRACETAM 10 MG/ML
1000 INJECTION INTRAVASCULAR ONCE
Status: COMPLETED | OUTPATIENT
Start: 2023-09-23 | End: 2023-09-23

## 2023-09-23 RX ORDER — SODIUM CHLORIDE 9 MG/ML
INJECTION, SOLUTION INTRAVENOUS PRN
Status: DISCONTINUED | OUTPATIENT
Start: 2023-09-23 | End: 2023-09-29 | Stop reason: HOSPADM

## 2023-09-23 RX ORDER — INSULIN LISPRO 100 [IU]/ML
0-4 INJECTION, SOLUTION INTRAVENOUS; SUBCUTANEOUS NIGHTLY
Status: DISCONTINUED | OUTPATIENT
Start: 2023-09-23 | End: 2023-09-29 | Stop reason: HOSPADM

## 2023-09-23 RX ORDER — LORAZEPAM 2 MG/ML
4 INJECTION INTRAMUSCULAR EVERY 5 MIN PRN
Status: DISCONTINUED | OUTPATIENT
Start: 2023-09-23 | End: 2023-09-29 | Stop reason: HOSPADM

## 2023-09-23 RX ORDER — ONDANSETRON 4 MG/1
4 TABLET, ORALLY DISINTEGRATING ORAL EVERY 8 HOURS PRN
Status: DISCONTINUED | OUTPATIENT
Start: 2023-09-23 | End: 2023-09-29 | Stop reason: HOSPADM

## 2023-09-23 RX ORDER — SODIUM CHLORIDE 0.9 % (FLUSH) 0.9 %
5-40 SYRINGE (ML) INJECTION EVERY 12 HOURS SCHEDULED
Status: DISCONTINUED | OUTPATIENT
Start: 2023-09-23 | End: 2023-09-29 | Stop reason: HOSPADM

## 2023-09-23 RX ORDER — MANNITOL 20 G/100ML
1 INJECTION, SOLUTION INTRAVENOUS ONCE
Status: DISCONTINUED | OUTPATIENT
Start: 2023-09-23 | End: 2023-09-23

## 2023-09-23 RX ORDER — LEVETIRACETAM 500 MG/1
500 TABLET ORAL EVERY 12 HOURS
Status: DISCONTINUED | OUTPATIENT
Start: 2023-09-23 | End: 2023-09-23

## 2023-09-23 RX ORDER — ONDANSETRON 2 MG/ML
4 INJECTION INTRAMUSCULAR; INTRAVENOUS EVERY 6 HOURS PRN
Status: DISCONTINUED | OUTPATIENT
Start: 2023-09-23 | End: 2023-09-29 | Stop reason: HOSPADM

## 2023-09-23 RX ORDER — LEVETIRACETAM 500 MG/1
500 TABLET ORAL EVERY 12 HOURS
Status: DISCONTINUED | OUTPATIENT
Start: 2023-09-23 | End: 2023-09-29 | Stop reason: HOSPADM

## 2023-09-23 RX ORDER — POLYETHYLENE GLYCOL 3350 17 G/17G
17 POWDER, FOR SOLUTION ORAL DAILY PRN
Status: DISCONTINUED | OUTPATIENT
Start: 2023-09-23 | End: 2023-09-29 | Stop reason: HOSPADM

## 2023-09-23 RX ORDER — INSULIN GLARGINE 100 [IU]/ML
10 INJECTION, SOLUTION SUBCUTANEOUS NIGHTLY
Status: DISCONTINUED | OUTPATIENT
Start: 2023-09-23 | End: 2023-09-24

## 2023-09-23 RX ORDER — INSULIN LISPRO 100 [IU]/ML
0-4 INJECTION, SOLUTION INTRAVENOUS; SUBCUTANEOUS NIGHTLY
Status: DISCONTINUED | OUTPATIENT
Start: 2023-09-23 | End: 2023-09-23

## 2023-09-23 RX ORDER — ASPIRIN 300 MG/1
300 SUPPOSITORY RECTAL DAILY
Status: DISCONTINUED | OUTPATIENT
Start: 2023-09-24 | End: 2023-09-25

## 2023-09-23 RX ORDER — LOSARTAN POTASSIUM 100 MG/1
100 TABLET ORAL DAILY
Status: DISCONTINUED | OUTPATIENT
Start: 2023-09-23 | End: 2023-09-29 | Stop reason: HOSPADM

## 2023-09-23 RX ORDER — ACETAMINOPHEN 650 MG/1
650 SUPPOSITORY RECTAL EVERY 6 HOURS PRN
Status: DISCONTINUED | OUTPATIENT
Start: 2023-09-23 | End: 2023-09-29 | Stop reason: HOSPADM

## 2023-09-23 RX ORDER — INSULIN LISPRO 100 [IU]/ML
0-16 INJECTION, SOLUTION INTRAVENOUS; SUBCUTANEOUS
Status: DISCONTINUED | OUTPATIENT
Start: 2023-09-23 | End: 2023-09-29 | Stop reason: HOSPADM

## 2023-09-23 RX ADMIN — LEVETIRACETAM 1000 MG: 10 INJECTION, SOLUTION INTRAVENOUS at 00:57

## 2023-09-23 RX ADMIN — SODIUM CHLORIDE, PRESERVATIVE FREE 10 ML: 5 INJECTION INTRAVENOUS at 09:08

## 2023-09-23 RX ADMIN — INSULIN GLARGINE 10 UNITS: 100 INJECTION, SOLUTION SUBCUTANEOUS at 22:01

## 2023-09-23 RX ADMIN — INSULIN LISPRO 10 UNITS: 100 INJECTION, SOLUTION INTRAVENOUS; SUBCUTANEOUS at 05:30

## 2023-09-23 RX ADMIN — SODIUM CHLORIDE, POTASSIUM CHLORIDE, SODIUM LACTATE AND CALCIUM CHLORIDE 75 ML: 600; 310; 30; 20 INJECTION, SOLUTION INTRAVENOUS at 05:32

## 2023-09-23 RX ADMIN — LEVETIRACETAM 500 MG: 500 TABLET, FILM COATED ORAL at 09:07

## 2023-09-23 RX ADMIN — SODIUM CHLORIDE, PRESERVATIVE FREE 10 ML: 5 INJECTION INTRAVENOUS at 22:05

## 2023-09-23 RX ADMIN — INSULIN LISPRO 4 UNITS: 100 INJECTION, SOLUTION INTRAVENOUS; SUBCUTANEOUS at 09:07

## 2023-09-23 RX ADMIN — ATORVASTATIN CALCIUM 80 MG: 80 TABLET, FILM COATED ORAL at 22:01

## 2023-09-23 RX ADMIN — PERFLUTREN 1.5 ML: 6.52 INJECTION, SUSPENSION INTRAVENOUS at 10:00

## 2023-09-23 RX ADMIN — LEVETIRACETAM 500 MG: 500 TABLET, FILM COATED ORAL at 22:01

## 2023-09-23 RX ADMIN — INSULIN LISPRO 4 UNITS: 100 INJECTION, SOLUTION INTRAVENOUS; SUBCUTANEOUS at 12:33

## 2023-09-23 RX ADMIN — INSULIN LISPRO 12 UNITS: 100 INJECTION, SOLUTION INTRAVENOUS; SUBCUTANEOUS at 16:56

## 2023-09-23 ASSESSMENT — PAIN SCALES - GENERAL
PAINLEVEL_OUTOF10: 0
PAINLEVEL_OUTOF10: 0

## 2023-09-23 NOTE — DISCHARGE INSTRUCTIONS
[facility] Stroke Program Survey  The [facility] values your feedback related to your recent hospital visit and admission. We strive to improve our Neuroscience program to promote better outcomes and recoveries for all our patients. The anonymous survey below consists of a few questions that are related to your stay and around your Stroke diagnosis, treatment, and recovery. It is anonymous and has only a few questions. The estimated length of time needed to complete this survey is 3 minutes or less. Thank you for completing this survey!

## 2023-09-23 NOTE — PLAN OF CARE
Problem: Discharge Planning  Goal: Discharge to home or other facility with appropriate resources  Outcome: Progressing     Problem: Pain  Goal: Verbalizes/displays adequate comfort level or baseline comfort level  Outcome: Progressing     Problem: Safety - Adult  Goal: Free from fall injury  Outcome: Progressing  Flowsheets (Taken 9/23/2023 0600)  Free From Fall Injury: Instruct family/caregiver on patient safety     Problem: Neurosensory - Adult  Goal: Achieves stable or improved neurological status  Outcome: Progressing  Goal: Absence of seizures  Outcome: Progressing  Goal: Remains free of injury related to seizures activity  Outcome: Progressing  Goal: Achieves maximal functionality and self care  Outcome: Progressing     Problem: Metabolic/Fluid and Electrolytes - Adult  Goal: Electrolytes maintained within normal limits  Outcome: Progressing  Goal: Hemodynamic stability and optimal renal function maintained  Outcome: Progressing  Goal: Glucose maintained within prescribed range  Outcome: Progressing

## 2023-09-23 NOTE — PROGRESS NOTES
Speech Language Pathology  Facility/Department:[facility] ICU  Initial Evaluation                                                       Name: Patrick Balbuena  : 1950  MRN: [id number]    Patient Diagnosis(es):   Patient Active Problem List    Diagnosis Date Noted    Primary osteoarthritis of both knees 2022    Hyperuricemia 2022    AMS (altered mental status) 2023    Stroke determined by clinical assessment ([address]) 2023    Body mass index (BMI) 50.0-59.9, adult ([address]) 2023    Venous insufficiency of both lower extremities 2020    Proliferative diabetic retinopathy, right eye ([address]) 10/07/2019    OAB (overactive bladder) 2018    Trigger ring finger of left hand 2018    Essential hypertension 2017    Post-menopausal 2017    Morbid obesity due to excess calories ([address]) 2017    Type 2 diabetes mellitus with stable proliferative retinopathy of right eye, with long-term current use of insulin ([address]) 2014     Past Medical History:   Diagnosis Date    Cataract     Diabetic retinopathy ([address])     High blood pressure     Proliferative diabetic retinopathy, right eye ([address])      Past Surgical History:   Procedure Laterality Date    CARPAL TUNNEL RELEASE  ,    Bilateral    CATARACT REMOVAL WITH IMPLANT      [facility]    COLONOSCOPY  2022    Dr. Mita Tapia    COLONOSCOPY N/A 2022    COLONOSCOPY performed by Viv Mcginnis MD at [address] Left 2018    left ring finger    HAND SURGERY  ,     Left x5    HYSTERECTOMY (CERVIX STATUS UNKNOWN) Right 1998    x1 ovary removed    SHOULDER SURGERY      Right     Reason for Referral:  Patrick Balbuena  was referred for a Speech Therapy evaluation to assess speech and swallow function.     History of Present Illness  Per admitting H&P: 2023  'Patrick Balbuena is  72 y.o. female

## 2023-09-23 NOTE — PROGRESS NOTES
4 Eyes Skin Assessment     NAME:  Efra Baez  YOB: 1950  MEDICAL RECORD NUMBER:  [id number]    The patient is being assessed for  Admission    I agree that at least one RN has performed a thorough Head to Toe Skin Assessment on the patient. ALL assessment sites listed below have been assessed. Areas assessed by both nurses:    Head, Face, Ears, Shoulders, Back, Chest, Arms, Elbows, Hands, Sacrum. Buttock, Coccyx, Ischium, Legs. Feet and Heels, and Under Medical Devices   Abdominal folds, groin and under the breast areas are moist, left lateral abdominal folds noted with skin tear and redness. Right sole with old scar. Does the Patient have a Wound?  No noted wound(s)       Jose Prevention initiated by RN: Yes  Wound Care Orders initiated by RN: No    Pressure Injury (Stage 3,4, Unstageable, DTI, NWPT, and Complex wounds) if present, place Wound referral order by RN under : No    New Ostomies, if present place, Ostomy referral order under : No     Nurse 1 eSignature: Electronically signed by Alanis Bernal RN on 9/23/23 at 6:29 AM EDT    **SHARE this note so that the co-signing nurse can place an eSignature**    Nurse 2 eSignature: Electronically signed by Nelia Arango RN on 9/23/23 at 6:47 AM EDT

## 2023-09-23 NOTE — PROGRESS NOTES
Patient received as direct transfer from [facility]. Pt drove herself to ED, A code stroke was called because pt presented with altered mental status (she has difficulty talking) and SOB. She was started on [facility], [facility] was done. Received keppra 1grm+ 2grm en route to [facility]. On adm; pt admitted to 4516; pt is awake but does not follow commands; Oriented to self only. She has no focal deficits (minimal RSW)   Routine admission done; head to toe assessment in flowsheet. Belongings at pts locker; no available family at bedside.

## 2023-09-23 NOTE — ED NOTES
Code stroke called immediately on arrival by MD whom saw patient immediately. Patient then taken to CT at this time. IV placed blood sent to lab.       Baljit Joe RN  09/22/23 9102

## 2023-09-23 NOTE — CONSULTS
ICU [address] Day:   ICU Day:                                                          Code:Full Code  Admit Date: 9/23/2023  PCP: JANICE Molina CNP                                  CC: AMS    Interval History:  No further events overnight, this AM patient conversing well occasionally forgetting words or substituting garbled language for words. Well oriented, no current complaints. AFVSS. HISTORY OF PRESENT ILLNESS:   Hung Alamo is  72 y.o. female with hx of T2DM, diabetic retinopathy, and HTN who presents for evaluation of altered mental status. On examination, patient with word finding difficulties and some receptive aphasia. Collateral history received from son and chart review. Per son, pt had called her friend at 8:30 pm stating that she was feeling unwell. LNK at 10:30 am on 9/23 when she had talked with her friend earlier in the day. Friend/family? Had told her to call 911; pt ended up driving herself to the ED instead around 11 pm. Per son, at the ED, his mother was less responsive and confused. Throughout the ED course, her mentation improved, although still with mental deficits not apparent at baseline. At baseline, son reports that pt is fully conversational and oriented. Denies any recent falls or trauma. Denies any prior hx of stroke or seizure. No recreational drug use. Last smoked several decades ago. At the ED, pt somewhat hypertensive and mildly tachycardic, but otherwise VSS. Primary neurologic deficit to be found was confusion and no orientation to self, place, or time. Stat CT head w/o contrast with suspicion for acute-subacute infarct in left MCA territory with mass effect in left cerebral hemisphere but no midline shift. CTA showed no significant stenosis or clot.  stroke team called, recommended monitoring at ICU but no need for TNK/thrombectomy at this time as pt out of the window for treatment.  Repeat CT Head w/o contrast showed NITRITE NEG  --    COLORU YELLOW Yellow   PHUR 6.0 5.0  5.0   CLARITYU CLEAR Clear   SPECGRAV 1.020 >=1.030   LEUKOCYTESUR NEG Negative   UROBILINOGEN  --  0.2   BILIRUBINUR NEG Negative   BLOODU NEG Negative   GLUCOSEU NEG Negative       MRI BRAIN WO CONTRAST    (Results Pending)         ASSESSMENT AND PLAN:   Dimitris Sandoval is a 72 y.o. female, who was HTN, T2DM with retinopathy, who presents for AMS likely 2/2 to left MCA distribution stroke. Neuro/Sedation:  AMS 2/2 to left MCA ischemic stroke  New onset receptive and expressive aphasia with LNK at 10:30 am on 9/22. CT Head w/o contrast with stable subacute-acute infarct in left MCA distribution with possible mass effect. UDS negative. Low suspicion for post-seizure AMS given hx.  - Neurocritical care consulted; appreciate recs  - Q4h neuro check  - MRI head w/o contrast  - Goal SBP < 180; will let pt autoregulate bp with labetalol prn  - ASA 81 mg qd  - Atorvastatin 80 mg qd   - seizure ppx (keppra 500 mg bid, rescue ativan prn)  - SLP/PT/OT consulted  - hold on prophylactic anticoagulation given concern for hemorrhagic conversion     FEN  - SLP eval prior to diet  - Advance diet as tolerated    Chronic Medical Conditions    HTN   Held home bp medications  - Goal SBP <180.  - Labetalol 10 mg prn for SBP > 180    T2DM with retinopathy  On home insulin and metformin  - LDSSI during admission       Code Status: Full Code  FEN: NPO, advance diet as tolerated  PPX:  SCD, keppra  DISPO: downgrade to floor    This patient has been staffed and discussed with Dr. Alecia Arrington  -----------------------------  Cayetano Alejandra MD, PGY-1  Internal Medicine Resident  9/23/2023  7:19 AM    Pulmonary & Critical Care    Patient seen and examined. I agree with Dr. Whiteside Spine history, physical, lab findings, assessment and plan.     Alecia Arrington MD

## 2023-09-23 NOTE — CONSULTS
List of Home Medications the patient is currently taking is complete. Home Medication list in EPIC updated to reflect changes noted below. Source of medications in list is [facility] prescription fill history. There were no changes made to admission med rec. If there is something specific that needs clarified, please contact pharmacy. Please call with any questions. Daniella Wise PharmD, [facility]  Main pharmacy: [id number]  9/23/2023 7:50 AM      Current Outpatient Medications   Medication Instructions    ACCU-CHEK GUIDE strip USE 1 STRIP TO CHECK GLUCOSE 4 TIMES DAILY    allopurinol (ZYLOPRIM) 300 mg, Oral, DAILY    amLODIPine (NORVASC) 5 mg, Oral, DAILY    Continuous Blood Gluc  (DEXCOM G6 ) SHYANNE Use 3-4 times daily to check glucose continually    Continuous Blood Gluc Sensor (DEXCOM G6 SENSOR) MISC Use 3-4 times daily to check glucose continually    Continuous Blood Gluc Transmit (DEXCOM G6 TRANSMITTER) MISC Use 3-4 times daily to check glucose continually. insulin NPH (HUMULIN N) 100 UNIT/ML injection vial INJECT SUBCUTANEOUSLY 55 UNITS AT BREAKFAST,    insulin regular (HUMULIN R) 100 UNIT/ML injection INJECT SUBCUTANEOUSLY 20 UNITS  AT BREAKFAST AND 15 UNITS AT  DINNER    Insulin Syringe-Needle U-100 31G X 5/16\" 1 ML MISC Use with both BID Humulin N and TID Humulin R injections = 5 times daily.     metFORMIN (GLUCOPHAGE) 1000 MG tablet 1 tab at breakfast 0.5 tab at dinner    montelukast (SINGULAIR) 10 mg, Oral, NIGHTLY    oxybutynin (DITROPAN) 5 mg, Oral, 3 TIMES DAILY    potassium chloride (KLOR-CON M) 10 MEQ extended release tablet 10 mEq, Oral, EVERY OTHER DAY    telmisartan (MICARDIS) 80 mg, Oral, DAILY    triamterene-hydroCHLOROthiazide (MAXZIDE-25) 37.5-25 MG per tablet 1 tablet, Oral, DAILY

## 2023-09-23 NOTE — PROGRESS NOTES
Remains hemodynamically stable/Round with [facility] + ICU Resident team/ transfer orders in place/ OT/PT/ST all therapies saw pt this am/ pt is able to eat regular + drink thin liquids/ pt does live alone at home/ grand daughter lives close by + son Ramo Basilio remain support system/ over all plan for discharge pt will need speech therapy/ MRI will be done tonight per MRI staff/  cont Neuro monitoring q 4 hours/ see notes/ flowsheet/ /orders    1700 Perfect serve Dr Julian Lynn attending regarding high glucose levels/ pending order for long acting    1859 receive order for long acting insulin/ see order

## 2023-09-23 NOTE — NURSE NAVIGATOR
Patient's chart reviewed for Stroke Core Measures and additional needs:    [x]   VTE prophylaxis - SCDs on   [x]   Antithrombotic (if applicable) - ordered for 9/24   [x]   Swallow screen prior to PO intake   [x]   Lipids / A1C ordered or resulted - ordered   [x]   Therapy consulted- completed, seen by PT OT and SLP   [x]   Care plan and Education template    Navigator to continue to follow patient while admitted, to assist with follow up and discharge planning as needed.      Nurse eSignature: Electronically signed by Chrissie Fabry, RN on 9/23/23 at 2:14 PM EDT - Neuroscience Navigator

## 2023-09-23 NOTE — PLAN OF CARE
Problem: Discharge Planning  Goal: Discharge to home or other facility with appropriate resources  9/23/2023 1449 by Monster Ramos RN  Outcome: Progressing  9/23/2023 0818 by Luciana Camacho RN  Outcome: Progressing     Problem: Pain  Goal: Verbalizes/displays adequate comfort level or baseline comfort level  9/23/2023 1449 by Monster Ramos RN  Outcome: Progressing  9/23/2023 0818 by Luciana Camacho RN  Outcome: Progressing     Problem: Safety - Adult  Goal: Free from fall injury  9/23/2023 1449 by Monster Ramos RN  Outcome: Progressing  Flowsheets (Taken 9/23/2023 1253)  Free From Fall Injury: Instruct family/caregiver on patient safety  9/23/2023 0818 by Luciana Camacho RN  Outcome: Progressing  Flowsheets (Taken 9/23/2023 0600)  Free From Fall Injury: Instruct family/caregiver on patient safety     Problem: Neurosensory - Adult  Goal: Achieves stable or improved neurological status  9/23/2023 1449 by Monster Ramos RN  Outcome: Progressing  9/23/2023 0818 by Luciana Camacho RN  Outcome: Progressing  Goal: Absence of seizures  9/23/2023 1449 by Monster Ramos RN  Outcome: Progressing  9/23/2023 0818 by Luciana Camacho RN  Outcome: Progressing  Goal: Remains free of injury related to seizures activity  9/23/2023 1449 by Monster Ramos RN  Outcome: Progressing  9/23/2023 0818 by Luciana Camacho RN  Outcome: Progressing  Goal: Achieves maximal functionality and self care  9/23/2023 1449 by Monster Ramos RN  Outcome: Progressing  9/23/2023 0818 by Luciana Camacho RN  Outcome: Progressing     Problem: Metabolic/Fluid and Electrolytes - Adult  Goal: Electrolytes maintained within normal limits  9/23/2023 1449 by Monster Ramos RN  Outcome: Progressing  9/23/2023 0818 by Luciana Camacho RN  Outcome: Progressing  Goal: Hemodynamic stability and optimal renal function maintained  9/23/2023 1449 by Monster Ramos RN  Outcome: Progressing  9/23/2023 0818 by Luciana Camacho RN  Outcome: Progressing  Goal: Glucose maintained within prescribed range  9/23/2023 1449 by Susan Dumont RN  Outcome: Progressing  9/23/2023 0818 by Alanis Bernal RN  Outcome: Progressing     Problem: SLP Adult - Impaired Communication  Goal: By Discharge: Demonstrates communication skills at highest level of function for planned discharge setting. See evaluation for individualized goals.   9/23/2023 1011 by NEAL Rubio  Outcome: Progressing     Problem: ABCDS Injury Assessment  Goal: Absence of physical injury  Outcome: Progressing

## 2023-09-23 NOTE — ED PROVIDER NOTES
EMERGENCY MEDICINE PROVIDER NOTE    Patient Identification  Pt Name: Marco Russell  MRN: [id number]  [facility] [facility] 1950  Date of evaluation: 9/22/2023  Provider: Jyoti Churchill MD  PCP: Giuliana Dawson, APRN - CNP    Chief Complaint  Altered Mental Status (Pt unable to answer questions upon arrival, pt ambulated from outside into triage and O2 sat 86%. )      HPI  (History provided by patient)  This is a 72 y.o. female PMH including HTN, diabetic retinopathy, DM2 who was brought in by self for altered mental status. On arrival, patient is unable to state her name or why she is here. Does appear acutely altered. Patient drove herself to the ER today and came to the triage window and was unable to state why she was here. On initial evaluation, patient is following commands and does not appear to have focal deficits but is unable to verbally respond to any questioning. Is nodding answers appropriately. States she does have some shortness of breath. Denies home oxygen requirement. Denies recent falls or injuries. Denies chest pain or vomiting. History limited due to mental status currently. Further history obtained from son and friend at bedside who states that son last spoke with patient 2 days ago and sounded normal on the phone. Further history obtained from friend at bedside during course who states that she spoke with patient approximately 2 hours prior to arrival and she was speaking normally on the phone. She advised patient to call 911 however patient drove herself to the emergency dept here. Denies known history of strokes, recent head injuries, falls, seizures, recent illness. Did recently start oxybutynin for urinary frequency but denies other recent medication changes.     I have reviewed the following nursing documentation:  Allergies: Cephalexin and Losartan    Past medical history:   Past Medical History:   Diagnosis Date    Cataract     Diabetic retinopathy ([address])     High blood present, no other significant change from prior EKG dated 3/2/18    Radiology  CT HEAD WO CONTRAST   Final Result   Addendum (preliminary) 1 of 1   ADDENDUM:   Findings were discussed with Susan Blair at 12:53 am on 9/23/2023. Final   Stable subtle hypodensity involving the left insula and adjacent left   temporal lobe concerning for acute infarct. No intracranial hemorrhage or   significant mass effect. XR CHEST PORTABLE   Final Result   Perihilar interstitial/hazy opacities which may represent early pulmonary   edema or bronchitis/early pneumonitis. CTA HEAD NECK W CONTRAST   Final Result   No acute arterial abnormality or hemodynamically significant arterial   stenosis in the head or neck. CT HEAD WO CONTRAST   Final Result   Question of decreased gray-white differentiation in the left temporal lobe   and the left insula cortex, suspicious for acute to subacute infarction in   the left MCA territory. Mass effect in the left cerebral hemisphere with decreased sulcation and mild   compression of the left lateral ventricle. No midline shift. Results were reported to Dr. Shweta Bernardo at 9:58 p.m. on September 22, 2023.          CT CHEST PULMONARY EMBOLISM W CONTRAST    (Results Pending)       Labs  Results for orders placed or performed during the hospital encounter of 09/22/23   COVID-19, Rapid    Specimen: Nasopharyngeal Swab   Result Value Ref Range    SARS-CoV-2, NAAT Not Detected Not Detected   Troponin   Result Value Ref Range    Troponin, High Sensitivity 14 0 - 14 ng/L   BNP   Result Value Ref Range    Pro-BNP 51 0 - 124 pg/mL   Protime-INR   Result Value Ref Range    Protime 13.0 11.5 - 14.8 sec    INR 0.98 0.84 - 1.16   CMP w/ Reflex to MG   Result Value Ref Range    Sodium 138 136 - 145 mmol/L    Potassium reflex Magnesium 4.6 3.5 - 5.1 mmol/L    Chloride 101 99 - 110 mmol/L    CO2 27 21 - 32 mmol/L    Anion Gap 10 3 - 16    Glucose 164 (H) 70 - 99 mg/dL    BUN 24 (H)

## 2023-09-23 NOTE — PROGRESS NOTES
Patient more awake; able to follow commands and have a conversation; still she have difficulty finding words,   She is impulsive and can't be reason with and short tempered, insisting to ambulate to bathroom. Pt ambulated to the bathroom 3x with 2 nurses. She tolerated but sometimes unsteady and leans more towards the right. MRI screening form completed. MRI dept called 3x but no response.

## 2023-09-23 NOTE — PROGRESS NOTES
Physical Therapy  Facility/Department: [facility] ICU  Physical Therapy Initial Assessment    Name: Jaison Baez  : 1950  MRN: [id number]  Date of Service: 2023    Discharge Recommendations: Shaguftadeandre Baez scored a 22/24 on the AM-PAC short mobility form. At this time, no further PT is recommended upon discharge due to demonstration of baseline function. Recommend patient returns to prior setting with prior services. PT Equipment Recommendations  Equipment Needed: No      Patient Diagnosis(es): There were no encounter diagnoses. Past Medical History:  has a past medical history of Cataract, Diabetic retinopathy ([address]), High blood pressure, and Proliferative diabetic retinopathy, right eye ([address]). Past Surgical History:  has a past surgical history that includes Carpal tunnel release (7377,5946); Hand surgery (, ); shoulder surgery (); Hysterectomy (Right, ); Cataract removal with implant; Finger trigger release; Finger trigger release; Colonoscopy (2022); Finger trigger release (Left, 2018); and Colonoscopy (N/A, 2022). Assessment   Assessment: pt is a 73 yo female from home alone and IND at baseline without AD, admitted after CVA. pt functionally at baseline performing all mobility with supervision and no AD. Occasional cues required for safety and to decrease pace of performance of tasks however no neurological deficits noted upon physical exam. pt educated on stroke symptoms, safe dc recommendations, and emotional support provided. Pt expressing no concerns for returning home and has family and friends to support as needed. Recommend home with initial 24hr, no further acute PT needs demonstrated.  PT to sign off  Therapy Prognosis: Good  Decision Making: Medium Complexity  Requires PT Follow-Up: No  Activity Tolerance  Activity Tolerance: Patient tolerated evaluation without incident  Activity Tolerance Comments: pt emotionally labile at times     Plan

## 2023-09-23 NOTE — PROGRESS NOTES
Occupational Therapy  [facility]  Occupational Therapy Initial Assessment and Treatment  Discharge    Name: Livier Baez  : 1950  MRN: [id number]  Date of Service: 2023    Discharge Recommendations:  Latonya Koch scored a 23/24 on the AM-PAC ADL Inpatient form. Current research shows that an AM-PAC score of 18 or greater is typically associated with a discharge to the patient's home setting. OT Equipment Recommendations  Equipment Needed: No       Patient Diagnosis(es): There were no encounter diagnoses. Past Medical History:  has a past medical history of Cataract, Diabetic retinopathy ([address]), High blood pressure, and Proliferative diabetic retinopathy, right eye ([address]). Past Surgical History:  has a past surgical history that includes Carpal tunnel release (5791,5930); Hand surgery (, ); shoulder surgery (); Hysterectomy (Right, ); Cataract removal with implant; Finger trigger release; Finger trigger release; Colonoscopy (2022); Finger trigger release (Left, 2018); and Colonoscopy (N/A, 2022). Assessment   Assessment: Presenting w/ c/o expressive aphasia. Today, pt able to express self w/ occasional word-finding difficulty. Demonstrating safety and Supervision level for functional transfers/mobility, and ADLs. No further skilled OT needs indicated. No DME needs. Will sign off. Discussed recommendation for initial 24hr S. Decision Making: Low Complexity  Activity Tolerance  Activity Tolerance: Patient Tolerated treatment well        Plan   Occupational Therapy Plan  Discharge acute OT. No further OT needs indicated. Restrictions   Up w/ assist    Subjective   General  Chart Reviewed: Yes  Additional Pertinent Hx: 72 y.o. F to ED w/ c/o symptoms of acute stroke (having trouble talking).       Hospital Course: transfer to [facility]; initial Head CT: suspicious for acute to subacute infarction in  the left MCA territory; repeat Head CT:

## 2023-09-23 NOTE — H&P
ICU HISTORY AND PHYSICAL       Hospital Day: 1  ICU Day: 1                                                         Code:Full Code  Admit Date: 9/23/2023  PCP: JANICE Moncada - MARY                                  CC: BARBI    HISTORY OF PRESENT ILLNESS:   Azeb Snowden is  72 y.o. female with hx of T2DM, diabetic retinopathy, and HTN who presents for evaluation of altered mental status. On examination, patient with word finding difficulties and some receptive aphasia. Collateral history received from son and chart review. Per son, pt had called her friend at 8:30 pm stating that she was feeling unwell. LNK at 10:30 am on 9/23 when she had talked with her friend earlier in the day. Friend/family? Had told her to call 911; pt ended up driving herself to the ED instead around 11 pm. Per son, at the ED, his mother was less responsive and confused. Throughout the ED course, her mentation improved, although still with mental deficits not apparent at baseline. At baseline, son reports that pt is fully conversational and oriented. Denies any recent falls or trauma. Denies any prior hx of stroke or seizure. No recreational drug use. Last smoked several decades ago. At the ED, pt somewhat hypertensive and mildly tachycardic, but otherwise VSS. Primary neurologic deficit to be found was confusion and no orientation to self, place, or time. Stat CT head w/o contrast with suspicion for acute-subacute infarct in left MCA territory with mass effect in left cerebral hemisphere but no midline shift. CTA showed no significant stenosis or clot.  stroke team called, recommended monitoring at ICU but no need for TNK/thrombectomy at this time as pt out of the window for treatment. Repeat CT Head w/o contrast showed stable acute infarct. Received 1 g keppra at ED, and additional 2 g en-route to [facility] ICU transfer; transferred for further neurologic monitoring.       PAST HISTORY:     Past Medical

## 2023-09-23 NOTE — CONSULTS
Neurology / Neurocritical Care Consult Note      No att. providers found is requesting this consult. Reason for Consult: AMS 2/2 to stroke and possible seizure  Admission Chief Complaint: trouble speaking    History of Present Illness     Lizet Charles is a 72 y.o. y/o morbidly obese female with history significant for hypertension and diabetes who presented to [facility] ED with symptoms of acute stroke. Patient reportedly talked to her son two days ago in her normal state of health. This was initially thought to be her last known well. Her sons states she is typically healthy, active and able to carry on conversations without any problem. It later came to light that she spoke with a friend on the phone around 1030 yesterday morning and was speaking normally at that time. Around 20:30 yesterday evening, she called her friend back and told her that she wasn't feeling well. It was at this time it was apparent the patient was having trouble talking and her friend told her to call 911. The patient instead decided to  herself to the emergency room. Upon arrival to the emergency room around 2117, she walked up to the  and was unable to tell triage her name or why she was came to the hospital. She appeared acutely altered. She was immediately taken back to an exam room and a code stroke was called. At that time, she was unable to answer any questions but was able to nod appropriately. She was hypertensive with a systolic BP in the 555'H but got as high as 222/90. She was also mildly tachycardic and slightly hypoxic. Labs remarkable for hemoglobin 11.9, creatinine 1.3, BUN 24, glucose 164, D-dimer 1.39, others WNL. Chest x-ray showed possible developing pulmonary edema. A CT scan was performed at 21:38 which revealed a possible subacute infarction in the Left MCA territory. There was also some concern for possible cerebral edema as evidenced by some possible sulcal effacement and mass post-stroke seizure. Though this is relatively uncommon, the stroke does appear to involve the left temporal lobe. RECOMMENDATIONS:  Imaging / Labs:  -Obtain MRI of the brain w/o contrast to further evaluate for stroke  -Echocardiogram with bubble   -Check lipid panel and hemoglobin A1C if not already completed     Medications:  -Start ASA 81mg   -High-intensity statin   -Continue Keppra 500mg BID for now  -SCDs for DVT prophylaxis  -Okay for DVT chemoprophylaxis      Consults / Nursing Care:  -HOB elevated to help prevent aspiration  -Okay for Q4 hour neuro checks   -NIHSS per guidelines   -Telemetry: monitor for atrial arrhythmias   -PT/OT: eval and treat  -PMR consult if rehab recommended   -ST: eval and treat and dysphagia screen  -Nursing bedside swallow prior to any PO intake   -Blood Pressure Management   -Allow blood pressure to autoregulate     Follow up / Discharge Recommendations:  -If no obvious source of stroke identified will need 30 day event monitor arranged at discharge  -Stroke Education at Discharge  -Follow up w/ Neurology in 3 months      Further recommendations to come once case discussed with attending physician.      JANICE Almaguer - CNP   Neurology & Neurocritical Care   9/23/2023 2:12 AM  [phone]

## 2023-09-23 NOTE — PLAN OF CARE
[facility] Hospitalist brief note  Consult received. Case reviewed with ER physician  82-year-old female with diabetic peripheral she feels she presented with altered mental status and found to have a subacute left middle cerebral artery territory stroke. Full note to follow.     JANICE Conteh - CNP    Thanks  Kelly Erickson MD

## 2023-09-24 ENCOUNTER — APPOINTMENT (OUTPATIENT)
Dept: MRI IMAGING | Age: 73
End: 2023-09-24
Attending: INTERNAL MEDICINE
Payer: MEDICARE

## 2023-09-24 PROBLEM — R90.89 ABNORMAL BRAIN MRI: Status: ACTIVE | Noted: 2023-09-24

## 2023-09-24 PROBLEM — R47.01 APHASIA: Status: ACTIVE | Noted: 2023-09-24

## 2023-09-24 LAB
ANION GAP SERPL CALCULATED.3IONS-SCNC: 8 MMOL/L (ref 3–16)
BASOPHILS # BLD: 0 K/UL (ref 0–0.2)
BASOPHILS NFR BLD: 0.9 %
BUN SERPL-MCNC: 21 MG/DL (ref 7–20)
CALCIUM SERPL-MCNC: 9.4 MG/DL (ref 8.3–10.6)
CHLORIDE SERPL-SCNC: 104 MMOL/L (ref 99–110)
CO2 SERPL-SCNC: 28 MMOL/L (ref 21–32)
CREAT SERPL-MCNC: 1.1 MG/DL (ref 0.6–1.2)
DEPRECATED RDW RBC AUTO: 15.2 % (ref 12.4–15.4)
EOSINOPHIL # BLD: 0.1 K/UL (ref 0–0.6)
EOSINOPHIL NFR BLD: 2.1 %
EST. AVERAGE GLUCOSE BLD GHB EST-MCNC: 174.3 MG/DL
GFR SERPLBLD CREATININE-BSD FMLA CKD-EPI: 54 ML/MIN/{1.73_M2}
GLUCOSE BLD-MCNC: 195 MG/DL (ref 70–99)
GLUCOSE BLD-MCNC: 260 MG/DL (ref 70–99)
GLUCOSE BLD-MCNC: 304 MG/DL (ref 70–99)
GLUCOSE BLD-MCNC: 318 MG/DL (ref 70–99)
GLUCOSE SERPL-MCNC: 239 MG/DL (ref 70–99)
HBA1C MFR BLD: 7.7 %
HCT VFR BLD AUTO: 31.9 % (ref 36–48)
HGB BLD-MCNC: 10.6 G/DL (ref 12–16)
LYMPHOCYTES # BLD: 1.4 K/UL (ref 1–5.1)
LYMPHOCYTES NFR BLD: 28.4 %
MCH RBC QN AUTO: 29.4 PG (ref 26–34)
MCHC RBC AUTO-ENTMCNC: 33.3 G/DL (ref 31–36)
MCV RBC AUTO: 88.2 FL (ref 80–100)
MONOCYTES # BLD: 0.6 K/UL (ref 0–1.3)
MONOCYTES NFR BLD: 12.2 %
NEUTROPHILS # BLD: 2.9 K/UL (ref 1.7–7.7)
NEUTROPHILS NFR BLD: 56.4 %
PERFORMED ON: ABNORMAL
PLATELET # BLD AUTO: 212 K/UL (ref 135–450)
PMV BLD AUTO: 9.2 FL (ref 5–10.5)
POTASSIUM SERPL-SCNC: 4.1 MMOL/L (ref 3.5–5.1)
RBC # BLD AUTO: 3.62 M/UL (ref 4–5.2)
SODIUM SERPL-SCNC: 140 MMOL/L (ref 136–145)
WBC # BLD AUTO: 5.1 K/UL (ref 4–11)

## 2023-09-24 PROCEDURE — 6370000000 HC RX 637 (ALT 250 FOR IP)

## 2023-09-24 PROCEDURE — 99233 SBSQ HOSP IP/OBS HIGH 50: CPT | Performed by: PSYCHIATRY & NEUROLOGY

## 2023-09-24 PROCEDURE — 85025 COMPLETE CBC W/AUTO DIFF WBC: CPT

## 2023-09-24 PROCEDURE — 1200000000 HC SEMI PRIVATE

## 2023-09-24 PROCEDURE — 70552 MRI BRAIN STEM W/DYE: CPT

## 2023-09-24 PROCEDURE — 2580000003 HC RX 258

## 2023-09-24 PROCEDURE — 80048 BASIC METABOLIC PNL TOTAL CA: CPT

## 2023-09-24 PROCEDURE — 6360000004 HC RX CONTRAST MEDICATION: Performed by: NURSE PRACTITIONER

## 2023-09-24 PROCEDURE — 92507 TX SP LANG VOICE COMM INDIV: CPT

## 2023-09-24 PROCEDURE — 6360000002 HC RX W HCPCS

## 2023-09-24 PROCEDURE — A9579 GAD-BASE MR CONTRAST NOS,1ML: HCPCS | Performed by: NURSE PRACTITIONER

## 2023-09-24 PROCEDURE — 6370000000 HC RX 637 (ALT 250 FOR IP): Performed by: STUDENT IN AN ORGANIZED HEALTH CARE EDUCATION/TRAINING PROGRAM

## 2023-09-24 PROCEDURE — 36415 COLL VENOUS BLD VENIPUNCTURE: CPT

## 2023-09-24 RX ORDER — INSULIN GLARGINE 100 [IU]/ML
20 INJECTION, SOLUTION SUBCUTANEOUS NIGHTLY
Status: DISCONTINUED | OUTPATIENT
Start: 2023-09-24 | End: 2023-09-25

## 2023-09-24 RX ADMIN — SODIUM CHLORIDE, PRESERVATIVE FREE 10 ML: 5 INJECTION INTRAVENOUS at 20:37

## 2023-09-24 RX ADMIN — ATORVASTATIN CALCIUM 80 MG: 80 TABLET, FILM COATED ORAL at 20:37

## 2023-09-24 RX ADMIN — LEVETIRACETAM 500 MG: 500 TABLET, FILM COATED ORAL at 09:18

## 2023-09-24 RX ADMIN — INSULIN LISPRO 12 UNITS: 100 INJECTION, SOLUTION INTRAVENOUS; SUBCUTANEOUS at 18:16

## 2023-09-24 RX ADMIN — GADOTERIDOL 19 ML: 279.3 INJECTION, SOLUTION INTRAVENOUS at 09:41

## 2023-09-24 RX ADMIN — SODIUM CHLORIDE, PRESERVATIVE FREE 10 ML: 5 INJECTION INTRAVENOUS at 09:20

## 2023-09-24 RX ADMIN — ASPIRIN 81 MG: 81 TABLET, CHEWABLE ORAL at 09:18

## 2023-09-24 RX ADMIN — LEVETIRACETAM 500 MG: 500 TABLET, FILM COATED ORAL at 20:36

## 2023-09-24 RX ADMIN — ACYCLOVIR SODIUM 900 MG: 50 INJECTION, SOLUTION INTRAVENOUS at 10:52

## 2023-09-24 RX ADMIN — INSULIN LISPRO 12 UNITS: 100 INJECTION, SOLUTION INTRAVENOUS; SUBCUTANEOUS at 12:26

## 2023-09-24 RX ADMIN — ACYCLOVIR SODIUM 900 MG: 50 INJECTION, SOLUTION INTRAVENOUS at 18:18

## 2023-09-24 ASSESSMENT — PAIN SCALES - GENERAL
PAINLEVEL_OUTOF10: 0
PAINLEVEL_OUTOF10: 0

## 2023-09-24 NOTE — PROGRESS NOTES
MRI showing diffuse FLAIR changes in the left temporal lobe, occipital lobe, posterior insula and thalamus without diffusion restriction which rules out stroke and is more consistent with encephalitis, post ictal changes or other underlying lesions. Her exam is intact and non focal and she is able to speak fluently and comprehend without issue this morning which suggests she may have been having seizures. She does not appear to be grossly infected but nonetheless, will start acyclovir and order and LP. Will also obtain contrasted MRI to evaluate for underlying mass lesion. Discussed with Dr. Reg Collado.     [address] [facility]

## 2023-09-24 NOTE — PLAN OF CARE
Problem: Pain  Goal: Verbalizes/displays adequate comfort level or baseline comfort level  Outcome: Progressing     Problem: Pain  Goal: Verbalizes/displays adequate comfort level or baseline comfort level  Outcome: Progressing     Problem: Safety - Adult  Goal: Free from fall injury  Outcome: Progressing     Problem: Neurosensory - Adult  Goal: Achieves stable or improved neurological status  Outcome: Progressing     Problem: Neurosensory - Adult  Goal: Absence of seizures  Outcome: Progressing

## 2023-09-24 NOTE — PROGRESS NOTES
Speech Language Pathology  Facility/Department:[place] ICU  Treatment session                                      Name: Fiordaliza Baez  : 1950  MRN: [id number]    Patient Diagnosis(es):   Patient Active Problem List    Diagnosis Date Noted    Primary osteoarthritis of both knees 2022    Hyperuricemia 2022    Cerebrovascular accident (CVA) due to embolism of left middle cerebral artery ([address]) 2023    Body mass index (BMI) 50.0-59.9, adult ([address]) 2023    Venous insufficiency of both lower extremities 2020    Proliferative diabetic retinopathy, right eye ([address]) 10/07/2019    OAB (overactive bladder) 2018    Trigger ring finger of left hand 2018    Primary hypertension 2017    Post-menopausal 2017    Morbid obesity due to excess calories ([address]) 2017    Type 2 diabetes mellitus with stable proliferative retinopathy of right eye, with long-term current use of insulin ([address]) 2014     Past Medical History:   Diagnosis Date    Cataract     Diabetic retinopathy ([address])     High blood pressure     Proliferative diabetic retinopathy, right eye ([address])      Past Surgical History:   Procedure Laterality Date    CARPAL TUNNEL RELEASE  ,    Bilateral    CATARACT REMOVAL WITH IMPLANT      [facility]    COLONOSCOPY  2022    Dr. Claudene Deforest    COLONOSCOPY N/A 2022    COLONOSCOPY performed by Markos Marsh MD at [address] Left 2018    left ring finger    HAND SURGERY  ,     Left x5    HYSTERECTOMY (CERVIX STATUS UNKNOWN) Right 1998    x1 ovary removed    SHOULDER SURGERY      Right     Reason for Referral:  Mariya Jane  was referred for a Speech Therapy evaluation to assess speech and swallow function.     History of Present Illness  Per admitting H&P: 2023  'Mariya Jane is  72 y.o. female with hx of T2DM, diabetic Detail Level: Detailed

## 2023-09-24 NOTE — PLAN OF CARE
Problem: Discharge Planning  Goal: Discharge to home or other facility with appropriate resources  9/24/2023 1358 by Faye Ledesma RN  Outcome: Progressing  9/24/2023 0506 by Juan David Hicks RN  Outcome: Progressing     Problem: Pain  Goal: Verbalizes/displays adequate comfort level or baseline comfort level  9/24/2023 1358 by Faye Ledesma RN  Outcome: Progressing  9/24/2023 0506 by Juan David Hicks RN  Outcome: Progressing     Problem: Safety - Adult  Goal: Free from fall injury  9/24/2023 1358 by Faye Ledesma RN  Outcome: Progressing  9/24/2023 0506 by Juan David Hicks RN  Outcome: Progressing     Problem: Neurosensory - Adult  Goal: Achieves stable or improved neurological status  9/24/2023 1358 by Faye Ledesma RN  Outcome: Progressing  9/24/2023 0506 by Juan David Hicks RN  Outcome: Progressing  Goal: Absence of seizures  9/24/2023 1358 by Faye Ledesma RN  Outcome: Progressing  9/24/2023 0506 by Juan David Hicks RN  Outcome: Progressing  Goal: Remains free of injury related to seizures activity  9/24/2023 1358 by Faye Ledesma RN  Outcome: Progressing  9/24/2023 0506 by Juan David Hicks RN  Outcome: Progressing  Goal: Achieves maximal functionality and self care  9/24/2023 1358 by Faye Ledesma RN  Outcome: Progressing  9/24/2023 0506 by Juan David Hicks RN  Outcome: Progressing     Problem: Metabolic/Fluid and Electrolytes - Adult  Goal: Electrolytes maintained within normal limits  9/24/2023 1358 by aFye Ledesma RN  Outcome: Progressing  9/24/2023 0506 by Juan David Hicks RN  Outcome: Progressing  Goal: Hemodynamic stability and optimal renal function maintained  9/24/2023 1358 by Faye Ledesma RN  Outcome: Progressing  9/24/2023 0506 by Juan David Hicks RN  Outcome: Progressing  Goal: Glucose maintained within prescribed range  9/24/2023 1358 by Faye Ledesma RN  Outcome: Progressing  9/24/2023 0506 by Juan David Hicks RN  Outcome: Progressing     Problem: ABCDS Injury

## 2023-09-24 NOTE — PROGRESS NOTES
Perfect serve Dr Bess Holter regarding pt last 2 blood glucose levels>300mg/dl despite  sliding scale coverage/ pending orders    1853 see new orders

## 2023-09-25 ENCOUNTER — APPOINTMENT (OUTPATIENT)
Dept: GENERAL RADIOLOGY | Age: 73
End: 2023-09-25
Attending: INTERNAL MEDICINE
Payer: MEDICARE

## 2023-09-25 LAB
ANION GAP SERPL CALCULATED.3IONS-SCNC: 11 MMOL/L (ref 3–16)
APPEARANCE CSF: CLEAR
BASOPHILS # BLD: 0 K/UL (ref 0–0.2)
BASOPHILS NFR BLD: 0.7 %
BUN SERPL-MCNC: 21 MG/DL (ref 7–20)
CALCIUM SERPL-MCNC: 9.3 MG/DL (ref 8.3–10.6)
CHLORIDE SERPL-SCNC: 102 MMOL/L (ref 99–110)
CLOT EVALUATION CSF: ABNORMAL
CO2 SERPL-SCNC: 25 MMOL/L (ref 21–32)
COLOR CSF: COLORLESS
CREAT SERPL-MCNC: 1 MG/DL (ref 0.6–1.2)
DEPRECATED RDW RBC AUTO: 14.9 % (ref 12.4–15.4)
EOSINOPHIL # BLD: 0.2 K/UL (ref 0–0.6)
EOSINOPHIL NFR BLD: 4.3 %
GFR SERPLBLD CREATININE-BSD FMLA CKD-EPI: >60 ML/MIN/{1.73_M2}
GLUCOSE BLD-MCNC: 293 MG/DL (ref 70–99)
GLUCOSE BLD-MCNC: 391 MG/DL (ref 70–99)
GLUCOSE CSF-MCNC: 124 MG/DL (ref 40–80)
GLUCOSE SERPL-MCNC: 244 MG/DL (ref 70–99)
HCT VFR BLD AUTO: 32.6 % (ref 36–48)
HGB BLD-MCNC: 11 G/DL (ref 12–16)
LYMPHOCYTES # BLD: 1.2 K/UL (ref 1–5.1)
LYMPHOCYTES NFR BLD: 24.9 %
MANUAL DIF COMMENT CSF-IMP: ABNORMAL
MCH RBC QN AUTO: 29.6 PG (ref 26–34)
MCHC RBC AUTO-ENTMCNC: 33.7 G/DL (ref 31–36)
MCV RBC AUTO: 87.8 FL (ref 80–100)
MENING+ENC PNL CSF NAA+NON-PROBE: NORMAL
MONOCYTES # BLD: 0.6 K/UL (ref 0–1.3)
MONOCYTES NFR BLD: 12.4 %
NEUTROPHILS # BLD: 2.8 K/UL (ref 1.7–7.7)
NEUTROPHILS NFR BLD: 57.7 %
NUC CELL # FLD MANUAL: 3 /CUMM (ref 0–5)
PERFORMED ON: ABNORMAL
PERFORMED ON: ABNORMAL
PLATELET # BLD AUTO: 197 K/UL (ref 135–450)
PMV BLD AUTO: 9.8 FL (ref 5–10.5)
POTASSIUM SERPL-SCNC: 3.9 MMOL/L (ref 3.5–5.1)
PROT CSF-MCNC: 43 MG/DL (ref 15–45)
RBC # BLD AUTO: 3.71 M/UL (ref 4–5.2)
RBC # FLD MANUAL: 845 /CUMM
REPORT: NORMAL
SODIUM SERPL-SCNC: 138 MMOL/L (ref 136–145)
TUBE # CSF: ABNORMAL
WBC # BLD AUTO: 4.8 K/UL (ref 4–11)

## 2023-09-25 PROCEDURE — 82784 ASSAY IGA/IGD/IGG/IGM EACH: CPT

## 2023-09-25 PROCEDURE — 87070 CULTURE OTHR SPECIMN AEROBIC: CPT

## 2023-09-25 PROCEDURE — 88112 CYTOPATH CELL ENHANCE TECH: CPT

## 2023-09-25 PROCEDURE — 86789 WEST NILE VIRUS ANTIBODY: CPT

## 2023-09-25 PROCEDURE — 85025 COMPLETE CBC W/AUTO DIFF WBC: CPT

## 2023-09-25 PROCEDURE — 82945 GLUCOSE OTHER FLUID: CPT

## 2023-09-25 PROCEDURE — 80048 BASIC METABOLIC PNL TOTAL CA: CPT

## 2023-09-25 PROCEDURE — 6360000002 HC RX W HCPCS

## 2023-09-25 PROCEDURE — 2580000003 HC RX 258

## 2023-09-25 PROCEDURE — 2500000003 HC RX 250 WO HCPCS

## 2023-09-25 PROCEDURE — 87798 DETECT AGENT NOS DNA AMP: CPT

## 2023-09-25 PROCEDURE — 86617 LYME DISEASE ANTIBODY: CPT

## 2023-09-25 PROCEDURE — 84157 ASSAY OF PROTEIN OTHER: CPT

## 2023-09-25 PROCEDURE — 6370000000 HC RX 637 (ALT 250 FOR IP)

## 2023-09-25 PROCEDURE — 36415 COLL VENOUS BLD VENIPUNCTURE: CPT

## 2023-09-25 PROCEDURE — 82042 OTHER SOURCE ALBUMIN QUAN EA: CPT

## 2023-09-25 PROCEDURE — 62328 DX LMBR SPI PNXR W/FLUOR/CT: CPT

## 2023-09-25 PROCEDURE — 87483 CNS DNA AMP PROBE TYPE 12-25: CPT

## 2023-09-25 PROCEDURE — 89050 BODY FLUID CELL COUNT: CPT

## 2023-09-25 PROCEDURE — 86788 WEST NILE VIRUS AB IGM: CPT

## 2023-09-25 PROCEDURE — 2060000000 HC ICU INTERMEDIATE R&B

## 2023-09-25 PROCEDURE — 87205 SMEAR GRAM STAIN: CPT

## 2023-09-25 PROCEDURE — 83916 OLIGOCLONAL BANDS: CPT

## 2023-09-25 PROCEDURE — 009U3ZX DRAINAGE OF SPINAL CANAL, PERCUTANEOUS APPROACH, DIAGNOSTIC: ICD-10-PCS | Performed by: RADIOLOGY

## 2023-09-25 PROCEDURE — 86341 ISLET CELL ANTIBODY: CPT

## 2023-09-25 PROCEDURE — 6370000000 HC RX 637 (ALT 250 FOR IP): Performed by: STUDENT IN AN ORGANIZED HEALTH CARE EDUCATION/TRAINING PROGRAM

## 2023-09-25 PROCEDURE — 6370000000 HC RX 637 (ALT 250 FOR IP): Performed by: NURSE PRACTITIONER

## 2023-09-25 PROCEDURE — 86255 FLUORESCENT ANTIBODY SCREEN: CPT

## 2023-09-25 PROCEDURE — 86592 SYPHILIS TEST NON-TREP QUAL: CPT

## 2023-09-25 PROCEDURE — 82040 ASSAY OF SERUM ALBUMIN: CPT

## 2023-09-25 PROCEDURE — 99232 SBSQ HOSP IP/OBS MODERATE 35: CPT | Performed by: NURSE PRACTITIONER

## 2023-09-25 RX ORDER — INSULIN GLARGINE 100 [IU]/ML
22 INJECTION, SOLUTION SUBCUTANEOUS NIGHTLY
Status: DISCONTINUED | OUTPATIENT
Start: 2023-09-25 | End: 2023-09-29 | Stop reason: HOSPADM

## 2023-09-25 RX ORDER — TRIAMTERENE AND HYDROCHLOROTHIAZIDE 37.5; 25 MG/1; MG/1
1 TABLET ORAL DAILY
Status: DISCONTINUED | OUTPATIENT
Start: 2023-09-25 | End: 2023-09-29 | Stop reason: HOSPADM

## 2023-09-25 RX ORDER — AMLODIPINE BESYLATE 5 MG/1
5 TABLET ORAL DAILY
Status: DISCONTINUED | OUTPATIENT
Start: 2023-09-25 | End: 2023-09-29 | Stop reason: HOSPADM

## 2023-09-25 RX ORDER — INSULIN LISPRO 100 [IU]/ML
6 INJECTION, SOLUTION INTRAVENOUS; SUBCUTANEOUS ONCE
Status: COMPLETED | OUTPATIENT
Start: 2023-09-25 | End: 2023-09-25

## 2023-09-25 RX ORDER — LIDOCAINE HYDROCHLORIDE 10 MG/ML
5 INJECTION, SOLUTION EPIDURAL; INFILTRATION; INTRACAUDAL; PERINEURAL ONCE
Status: COMPLETED | OUTPATIENT
Start: 2023-09-25 | End: 2023-09-25

## 2023-09-25 RX ADMIN — ACYCLOVIR SODIUM 900 MG: 50 INJECTION, SOLUTION INTRAVENOUS at 22:56

## 2023-09-25 RX ADMIN — INSULIN LISPRO 8 UNITS: 100 INJECTION, SOLUTION INTRAVENOUS; SUBCUTANEOUS at 15:11

## 2023-09-25 RX ADMIN — LIDOCAINE HYDROCHLORIDE 5 ML: 10 INJECTION, SOLUTION EPIDURAL; INFILTRATION; INTRACAUDAL at 10:19

## 2023-09-25 RX ADMIN — ATORVASTATIN CALCIUM 80 MG: 80 TABLET, FILM COATED ORAL at 20:56

## 2023-09-25 RX ADMIN — LEVETIRACETAM 500 MG: 500 TABLET, FILM COATED ORAL at 11:51

## 2023-09-25 RX ADMIN — INSULIN GLARGINE 22 UNITS: 100 INJECTION, SOLUTION SUBCUTANEOUS at 21:01

## 2023-09-25 RX ADMIN — ACYCLOVIR SODIUM 900 MG: 50 INJECTION, SOLUTION INTRAVENOUS at 11:51

## 2023-09-25 RX ADMIN — ACYCLOVIR SODIUM 900 MG: 50 INJECTION, SOLUTION INTRAVENOUS at 01:08

## 2023-09-25 RX ADMIN — AMLODIPINE BESYLATE 5 MG: 5 TABLET ORAL at 22:53

## 2023-09-25 RX ADMIN — INSULIN LISPRO 6 UNITS: 100 INJECTION, SOLUTION INTRAVENOUS; SUBCUTANEOUS at 21:01

## 2023-09-25 RX ADMIN — SODIUM CHLORIDE, PRESERVATIVE FREE 10 ML: 5 INJECTION INTRAVENOUS at 21:24

## 2023-09-25 RX ADMIN — LEVETIRACETAM 500 MG: 500 TABLET, FILM COATED ORAL at 20:56

## 2023-09-25 RX ADMIN — INSULIN LISPRO 4 UNITS: 100 INJECTION, SOLUTION INTRAVENOUS; SUBCUTANEOUS at 21:01

## 2023-09-25 ASSESSMENT — PAIN SCALES - GENERAL: PAINLEVEL_OUTOF10: 0

## 2023-09-25 NOTE — CONSULTS
NEUROSURGERY CONSULT NOTE    Sherry OROZCO  [id number]   1950 9/25/2023    Requesting physician: Carly Perry MD    Reason for consultation:brain edema    History of present illness: Patient is a 72 y.o. female w/ PMH of HTN and DM who presented to OSH with aphasia and found to have abnormal head CT suggesting either stroke or other lesion in left MCA territory and with subsequent MRI brain w/wo contrast that demonstrates sizable, non-enhancing, left temporal lobe, left occipital lobe, posterior insula, temporal operculum, and left thalamus hyperintensity. Patient reportedly talked to her son two days ago in her normal state of health. This was initially thought to be her last known well. Her sons states she is typically healthy, active and able to carry on conversations without any problem. It later came to light that she spoke with a friend on the phone around [phone] on 9/23 and was speaking normally at that time. Around 20:30 9/23, she called her friend back and told her that she wasn't feeling well. It was at this time it was apparent the patient was having trouble talking and her friend told her to call 911. The patient instead decided to  herself to the emergency room. Upon arrival to the emergency room around 2117, she walked up to the  and was unable to tell triage her name or why she was came to the hospital. She appeared acutely altered. She was immediately taken back to an exam room and a code stroke was called. At that time, she was unable to answer any questions but was able to nod appropriately. She was hypertensive with a systolic BP in the 975'L but got as high as 222/90. She was also mildly tachycardic and slightly hypoxic. Labs remarkable for hemoglobin 11.9, creatinine 1.3, BUN 24, glucose 164, D-dimer 1.39, others WNL. Chest x-ray showed possible developing pulmonary edema.       A CT scan was performed at 21:38 which revealed a possible subacute standpoint. Patient was seen and examined with Estuardo Medina who agrees with above assessment and plan. Electronically signed by: JANICE Chopra CNP, APRN-CNP, 9/25/2023 10:41 AM  [phone]   Time independently spent by Nurse Practitioner reviewing chart and prior testing, obtaining history from patient, examining patient, ordering appropriate medications / diagnostics, reviewing results of diagnostics, counseling and educating patient / family, communicating plan with other providers and documenting clinical information in the EMR was approximately 60 minutes.

## 2023-09-25 NOTE — PROGRESS NOTES
09/25/23 1126   Encounter Summary   Encounter Overview/Reason  Initial Encounter   Service Provided For: Patient   Referral/Consult From: Nurse   Support System [employer]/armida community; Family members   Last Encounter  09/25/23  (trupti)   Complexity of Encounter Moderate   Begin Time 1110   End Time  1127   Total Time Calculated 17 min   Assessment/Intervention/Outcome   Assessment Calm; Hopeful   Intervention Active listening;Discussed belief system/Church practices/armida;Discussed relationship with God;Discussed meaning/purpose;Explored/Affirmed feelings, thoughts, concerns;Nurtured Hope   Outcome Comfort;Engaged in conversation;Expressed feelings of Araceli, Peace and/or Love;Expressed feelings, needs, and concerns;Expressed Gratitude;Receptive;Peace; Venting emotion     PT was found alone. She is a [employer] and is active in her [employer]. After introductions, PT explained her her \"spinal tap\" experience, one that was unexpectedly peaceful and \"I felt a little some thing but I had this peace\". She then connected this peace with her armida in God and that [facility] as a plan for me. \" She shared. \"These are tears of araceli. \" She explained. She then received a visitor. .. No other needs at this time as she was thankful for the time shared.  .  Staff Mia Benavides MA, [facility]

## 2023-09-25 NOTE — CARE COORDINATION
Case Management Assessment  Initial Evaluation    Date/Time of Evaluation: 9/25/2023 1:42 PM  Assessment Completed by: Efrain Mejia RN    If patient is discharged prior to next notation, then this note serves as note for discharge by case management. Patient Name: Anne Bill                   YOB: 1950  Diagnosis: AMS (altered mental status) [R41.82]  Stroke determined by clinical assessment [facility]) [I63.9]                   Date / Time: 9/23/2023  2:35 AM    Patient Admission Status: Inpatient   Readmission Risk (Low < 19, Mod (19-27), High > 27): Readmission Risk Score: 9.9    Current PCP: JANICE Fletcher CNP  PCP verified by CM? No    Chart Reviewed: Yes      History Provided by: Patient  Patient Orientation: Alert and Oriented    Patient Cognition: Alert    Hospitalization in the last 30 days (Readmission):  No    If yes, Readmission Assessment in CM Navigator will be completed. Advance Directives:      Code Status: Full Code   Patient's Primary Decision Maker is: Legal Next of Kin    Primary Decision MakerJensen Blas - Child - [phone]    Discharge Planning:    Patient lives with: Alone Type of Home: House  Primary Care Giver:    Patient Support Systems include: Family Members   Current Financial resources: Medicare  Current community resources: None  Current services prior to admission: None            Current DME:              Type of Home Care services:  None    ADLS  Prior functional level: Independent in ADLs/IADLs  Current functional level: Independent in ADLs/IADLs    PT AM-PAC: 22 /24  OT AM-PAC: 23 /24    Family can provide assistance at DC: Yes  Would you like Case Management to discuss the discharge plan with any other family members/significant others, and if so, who?  No  Plans to Return to Present Housing: Yes  Other Identified Issues/Barriers to RETURNING to current housing: none  Potential Assistance needed at discharge: N/A            Potential DME: Patient expects to discharge to: House  Plan for transportation at discharge: Self    Financial    Payor: [id number] / Plan: Thom Dakin / Product Type: *No Product type* /     Does insurance require precert for SNF: Yes    Potential assistance Purchasing Medications:    Meds-to-Beds request:        [facility] ([address] [facility] [address] [facility] [phone] f [phone] [address] [address] [address]  Phone: [phone] Fax: [phone] [email or web address] [address] [facility]), [address] [facility] [phone] f [phone]  [address] [facility]) [address]  Phone: [phone] Fax: [phone] [address] [facility], [address] [place] [address] [phone] Jose Lopez [phone]  [address] [facility] [address]  Phone: [phone] Fax: [phone] [email or web address] [address] [phone] - F [phone]  [address] [address]  Phone: [phone] Fax: [phone]    [facility] [id number] [place], [address] [phone] Jose Lopez [phone]  [address]  [place] [address]  Phone: [phone] Fax: [phone]      Notes:    Factors facilitating achievement of predicted outcomes: Family support    Barriers to discharge: n/a    Additional Case Management Notes: Patient is from home alone, independent pta, drives self. Patient plans to return home. No CM needs at this time. Family to transport. The Plan for Transition of Care is related to the following treatment goals of AMS (altered mental status) [R41.82]  Stroke determined by clinical assessment ([address]) [F19.5]    IF APPLICABLE: The Patient and/or patient representative Chrisitne Marrero and her family were provided with a choice of provider and agrees with the discharge plan.  Freedom of choice list with basic

## 2023-09-25 NOTE — PROGRESS NOTES
V2.0    [facility] Progress Note      Name:  Taryn Baez /Age/Sex: 1950  (72 y.o. female)   MRN & CSN:  [id number] & [id number] Encounter Date/Time: 2023 9:16 AM EDT   Location:  5854/3971-49 PCP: JANICE Bar - CNP     Attending:Jude Nagel, [address] [address] Day: 3    Assessment and Recommendations   Mg Millan is a 72 y.o. female with pmh of HTN and DM who presents with aphasia episode of uncertain etiology    #Aphasia, uncertain etiology, initially thought to be CVA but MR brain not c/w CVA, possible seizures vs. Encephalitis  - Neuro consulted  - Obtaining LP today  - On empiric acyclovir pending HSV CSF studies  - Obtained MR brain with contraast: stable appearance of edematous changes in L temporal, occipital lobes and thalamus  - PT/OT/SLP ordered  - Continue neuro checks  - NSGY consulted: will consider biopsy pending CSF studies    #HTN - Resume home antihypertensives  #Type II DM - Glargine + SSI for BG control: increase glargine tonight discussed with patient agreeable to changes    Diet ADULT DIET; Regular; 4 carb choices (60 gm/meal); regular food/ thin liquids per ST   DVT Prophylaxis [] Lovenox, []  Heparin, [] SCDs, [] Ambulation,  [] Eliquis, [] Xarelto  [] Coumadin   Code Status Full Code   Disposition From: Home  Expected Disposition: Home  Estimated Date of Discharge: 1-2 days  Patient requires continued admission due to neuro eval   Surrogate Decision Maker/ POKATRINA Baez, child     Personally reviewed Lab Studies and Imaging       Subjective:     Chief Complaint: Aphasia    Mg Millan is a 72 y.o. female w/ HTN and DM who presented with episode of aphasia and had what initially appeared to be acute L MCA stroke and admitted for further evaluation. No acute clinical changes reported overnight. Her vitals remain stable. No acute neuro changes reported. Patient reports continued issues with word-finding but otherwise has no new complaints.       Review

## 2023-09-25 NOTE — PLAN OF CARE
Problem: Neurosensory - Adult  Goal: Achieves stable or improved neurological status  Outcome: Progressing  Note: Neuro checks q 4     Problem: Safety - Adult  Goal: Free from fall injury  Outcome: Progressing  Note: Patient will remain fall free during stay by implementing and following all safety precautions.

## 2023-09-25 NOTE — PROGRESS NOTES
NEUROLOGY / NEUROCRITICAL CARE PROGRESS NOTE       Patient Name: Marquez Hooker YOB: 1950   Sex: Female Age: 72 yrs     CC / Reason for Consult: Aphasia    Subjective / ROS / Updates over last 24 hours:  MRI shows L sided inflammatory changes  Patient doing well, back to baseline. No further aphasia. No weakness. HISTORY     Allergies Allergies   Allergen Reactions    Cephalexin Itching     Can take Amoxil    Losartan Other (See Comments)     lightheaded      Diet ADULT DIET;  Regular; 4 carb choices (60 gm/meal); regular food/ thin liquids per ST   Isolation No active isolations     LABS   Metabolic Panel Recent Labs     09/22/23 2132 09/22/23 2237 09/23/23  0535 09/24/23  0526 09/25/23  0536     --  134* 140 138   K 4.6  --  4.9 4.1 3.9     --  97* 104 102   CO2 27  --  27 28 25   BUN 24*  --  24* 21* 21*   CREATININE 1.3*  --  1.2 1.1 1.0   GLUCOSE 164*  --  295* 239* 244*   CALCIUM 9.9  --  9.6 9.4 9.3   LABALBU 4.5  --   --   --   --    ALKPHOS 88  --   --   --   --    ALT 14  --   --   --   --    AST 17  --   --   --   --    AMMONIA  --  25  --   --   --       CBC / Coags Recent Labs     09/22/23 2132 09/24/23  0526 09/25/23  0536   WBC 6.5 5.1 4.8   RBC 4.13 3.62* 3.71*   HGB 11.9* 10.6* 11.0*   HCT 36.7 31.9* 32.6*    212 197   INR 0.98  --   --       Other Lab Results   Component Value Date/Time    LABA1C 7.7 09/23/2023 05:35 AM    LDLCALC 80 09/23/2023 05:35 AM    TRIG 55 09/23/2023 05:35 AM    TSH 4.31 08/26/2022 09:10 AM    COVID19 Not Detected 09/22/2023 09:56 PM     No results found for: \"PHENYTOIN\", \"KEPPRA\", \"LACOSA\", \"LAMO\", \"VALPROATE\", \"LACTSEPSIS\", \"LACTA\"     CURRENT SCHEDULED MEDICATIONS   Inpatient Medications     acyclovir, 10 mg/kg (Adjusted), IntraVENous, Q8H    insulin glargine, 20 Units, SubCUTAneous, Nightly    [Held by provider] amLODIPine, 5 mg, Oral, Daily    [Held by provider] triamterene-hydroCHLOROthiazide, 1 tablet, Oral,

## 2023-09-25 NOTE — PROGRESS NOTES
Pt admitted to 5520. VSS on RA. Oriented to room and use of call light. 4 eye assessment complete. Denies further needs at this time. Call light in reach.

## 2023-09-25 NOTE — PROGRESS NOTES
Patient is alert and oriented x 4. VSS on room. Education on fall safety, use of camera and alarms given for better understanding of safety precautions that are in place. Pt verbalizes understanding. Pt up to chair. All safety precautions in place.

## 2023-09-25 NOTE — PROGRESS NOTES
Pt refuses to take lantus insulin at this time. States that her Eyad Fray will drop really low in the middle of the night. \" Requests to be administered in am.

## 2023-09-25 NOTE — PROGRESS NOTES
4 Eyes Skin Assessment     NAME:  Luna Baez  YOB: 1950  MEDICAL RECORD NUMBER:  [id number]    The patient is being assessed for  Admission    I agree that at least one RN has performed a thorough Head to Toe Skin Assessment on the patient. ALL assessment sites listed below have been assessed. Areas assessed by both nurses:    Head, Face, Ears, Shoulders, Back, Chest, Arms, Elbows, Hands, Sacrum. Buttock, Coccyx, Ischium, and Legs. Feet and Heels        Does the Patient have a Wound? Redness and excoriation to groin and skin folds of back. Cintia Lay Prevention initiated by RN: No  Wound Care Orders initiated by RN: No    Pressure Injury (Stage 3,4, Unstageable, DTI, NWPT, and Complex wounds) if present, place Wound referral order by RN under : No    New Ostomies, if present place, Ostomy referral order under : No     Nurse 1 eSignature: Electronically signed by Krzysztof Nicholson RN on 9/25/23 at 4:18 AM EDT    **SHARE this note so that the co-signing nurse can place an eSignature**    Nurse 2 eSignature: Electronically signed by Edin Reece RN on 9/25/23 at 4:34 AM EDT

## 2023-09-26 LAB
ALBUMIN CSF-MCNC: 23.2 MG/DL (ref 10–30)
ALBUMIN SERPL-MCNC: 3968 MG/DL (ref 3400–5000)
ANION GAP SERPL CALCULATED.3IONS-SCNC: 7 MMOL/L (ref 3–16)
BACTERIA CSF CULT: NORMAL
BASOPHILS # BLD: 0 K/UL (ref 0–0.2)
BASOPHILS NFR BLD: 0.7 %
BUN SERPL-MCNC: 18 MG/DL (ref 7–20)
CALCIUM SERPL-MCNC: 9.6 MG/DL (ref 8.3–10.6)
CHLORIDE SERPL-SCNC: 105 MMOL/L (ref 99–110)
CO2 SERPL-SCNC: 30 MMOL/L (ref 21–32)
CREAT SERPL-MCNC: 1 MG/DL (ref 0.6–1.2)
DEPRECATED RDW RBC AUTO: 15.3 % (ref 12.4–15.4)
EOSINOPHIL # BLD: 0.3 K/UL (ref 0–0.6)
EOSINOPHIL NFR BLD: 5.1 %
GFR SERPLBLD CREATININE-BSD FMLA CKD-EPI: >60 ML/MIN/{1.73_M2}
GLUCOSE BLD-MCNC: 210 MG/DL (ref 70–99)
GLUCOSE BLD-MCNC: 217 MG/DL (ref 70–99)
GLUCOSE BLD-MCNC: 229 MG/DL (ref 70–99)
GLUCOSE BLD-MCNC: 238 MG/DL (ref 70–99)
GLUCOSE BLD-MCNC: 291 MG/DL (ref 70–99)
GLUCOSE BLD-MCNC: 319 MG/DL (ref 70–99)
GLUCOSE SERPL-MCNC: 225 MG/DL (ref 70–99)
GRAM STN SPEC: NORMAL
HCT VFR BLD AUTO: 32.7 % (ref 36–48)
HGB BLD-MCNC: 10.9 G/DL (ref 12–16)
IGG CSF-MCNC: 4.6 MG/DL (ref 0–6)
IGG CSF/SERPL: 0.59 {RATIO} (ref 0.2–0.7)
IGG SERPL-MCNC: 1325 MG/DL (ref 700–1600)
IGG SYNTH RATE SER+CSF CALC-MRATE: 0.8 MG/DAY (ref -9.9–3.3)
LYMPHOCYTES # BLD: 1.2 K/UL (ref 1–5.1)
LYMPHOCYTES NFR BLD: 23.2 %
MCH RBC QN AUTO: 29.3 PG (ref 26–34)
MCHC RBC AUTO-ENTMCNC: 33.3 G/DL (ref 31–36)
MCV RBC AUTO: 87.8 FL (ref 80–100)
MONOCYTES # BLD: 0.6 K/UL (ref 0–1.3)
MONOCYTES NFR BLD: 12.1 %
NEUTROPHILS # BLD: 2.9 K/UL (ref 1.7–7.7)
NEUTROPHILS NFR BLD: 58.9 %
PERFORMED ON: ABNORMAL
PLATELET # BLD AUTO: 201 K/UL (ref 135–450)
PMV BLD AUTO: 9.1 FL (ref 5–10.5)
POTASSIUM SERPL-SCNC: 4.2 MMOL/L (ref 3.5–5.1)
RBC # BLD AUTO: 3.72 M/UL (ref 4–5.2)
SODIUM SERPL-SCNC: 142 MMOL/L (ref 136–145)
WBC # BLD AUTO: 5 K/UL (ref 4–11)

## 2023-09-26 PROCEDURE — 6360000002 HC RX W HCPCS

## 2023-09-26 PROCEDURE — 6370000000 HC RX 637 (ALT 250 FOR IP): Performed by: NURSE PRACTITIONER

## 2023-09-26 PROCEDURE — 2060000000 HC ICU INTERMEDIATE R&B

## 2023-09-26 PROCEDURE — 6370000000 HC RX 637 (ALT 250 FOR IP)

## 2023-09-26 PROCEDURE — 85025 COMPLETE CBC W/AUTO DIFF WBC: CPT

## 2023-09-26 PROCEDURE — 6370000000 HC RX 637 (ALT 250 FOR IP): Performed by: STUDENT IN AN ORGANIZED HEALTH CARE EDUCATION/TRAINING PROGRAM

## 2023-09-26 PROCEDURE — 2580000003 HC RX 258

## 2023-09-26 PROCEDURE — 80048 BASIC METABOLIC PNL TOTAL CA: CPT

## 2023-09-26 PROCEDURE — 36415 COLL VENOUS BLD VENIPUNCTURE: CPT

## 2023-09-26 RX ORDER — HYDROXYZINE PAMOATE 25 MG/1
25 CAPSULE ORAL ONCE
Status: DISCONTINUED | OUTPATIENT
Start: 2023-09-26 | End: 2023-09-29 | Stop reason: HOSPADM

## 2023-09-26 RX ADMIN — SODIUM CHLORIDE, PRESERVATIVE FREE 5 ML: 5 INJECTION INTRAVENOUS at 20:40

## 2023-09-26 RX ADMIN — LEVETIRACETAM 500 MG: 500 TABLET, FILM COATED ORAL at 20:40

## 2023-09-26 RX ADMIN — INSULIN LISPRO 4 UNITS: 100 INJECTION, SOLUTION INTRAVENOUS; SUBCUTANEOUS at 16:57

## 2023-09-26 RX ADMIN — SODIUM CHLORIDE, PRESERVATIVE FREE 10 ML: 5 INJECTION INTRAVENOUS at 08:37

## 2023-09-26 RX ADMIN — INSULIN GLARGINE 22 UNITS: 100 INJECTION, SOLUTION SUBCUTANEOUS at 20:40

## 2023-09-26 RX ADMIN — INSULIN LISPRO 8 UNITS: 100 INJECTION, SOLUTION INTRAVENOUS; SUBCUTANEOUS at 12:14

## 2023-09-26 RX ADMIN — TRIAMTERENE AND HYDROCHLOROTHIAZIDE 1 TABLET: 37.5; 25 TABLET ORAL at 00:40

## 2023-09-26 RX ADMIN — AMLODIPINE BESYLATE 5 MG: 5 TABLET ORAL at 08:37

## 2023-09-26 RX ADMIN — INSULIN LISPRO 4 UNITS: 100 INJECTION, SOLUTION INTRAVENOUS; SUBCUTANEOUS at 20:41

## 2023-09-26 RX ADMIN — LEVETIRACETAM 500 MG: 500 TABLET, FILM COATED ORAL at 08:37

## 2023-09-26 RX ADMIN — INSULIN LISPRO 4 UNITS: 100 INJECTION, SOLUTION INTRAVENOUS; SUBCUTANEOUS at 08:37

## 2023-09-26 RX ADMIN — LABETALOL HYDROCHLORIDE 10 MG: 5 INJECTION, SOLUTION INTRAVENOUS at 14:08

## 2023-09-26 RX ADMIN — ATORVASTATIN CALCIUM 80 MG: 80 TABLET, FILM COATED ORAL at 20:40

## 2023-09-26 ASSESSMENT — PAIN SCALES - GENERAL: PAINLEVEL_OUTOF10: 0

## 2023-09-26 NOTE — PROGRESS NOTES
09/26/23 1123   Encounter Summary   Encounter Overview/Reason  Attempted Encounter   Service Provided For: Patient   Referral/Consult From: Nurse   Last Encounter  09/26/23  (trupti    sleep)   Begin Time 1125   End Time  1130   Total Time Calculated 5 min   Spiritual/Emotional needs   Type Spiritual Support   Assessment/Intervention/Outcome   Assessment Unable to assess  (sleep?)     Staff Alissa Guillory MA, [facility]

## 2023-09-26 NOTE — PROGRESS NOTES
Pt had 6 beats of vtach. Pt symptomatic and returned to NSR. Vitals stable w/ exception to elevated bp. Dr. Yoana Michelle informed and advised to give prn labetolol. Will reassess bp.

## 2023-09-26 NOTE — PROGRESS NOTES
Pt a/o x4. VSS w/ exception to elevated bp. Denies N/V. Neuro checks WDL. No complaints of pain at this time. Pt is a SBA OOB. Seizure precautions in place. Will continue to monitor patient.      Marbin Luna RN

## 2023-09-26 NOTE — PROGRESS NOTES
Pt reporting tongue feeling numb and a HA. RN reassessed NIH score at a 1 d/t tongue numbness. Dr. Zuleyka Maldonado informed and came up to evaluate pt at bedside. Neurology NP informed as well.      Jersey Alcala RN

## 2023-09-26 NOTE — PLAN OF CARE
Problem: Pain  Goal: Verbalizes/displays adequate comfort level or baseline comfort level  Outcome: Progressing  Note: Pt denies pain at this time. WCTM. Problem: Safety - Adult  Goal: Free from fall injury  9/25/2023 2240 by Toni Sotomayor RN  Outcome: Progressing  Note: Standard safety measures in place. Pt verbalized understanding to call before getting OOB. Call light within reach. 9/25/2023 2231 by Toni Sotomayor RN  Outcome: Progressing    Problem: Metabolic/Fluid and Electrolytes - Adult  Goal: Glucose maintained within prescribed range  Outcome: Progressing  Note:  this evening. Contacted doctor and medicated appropriately. Pt ACHS with scheduled and sliding scale insulin. WCTM as ordered and report for interventions if needed. Problem: Chronic Conditions and Co-morbidities  Goal: Patient's chronic conditions and co-morbidity symptoms are monitored and maintained or improved  Outcome: Progressing  Note: Pt with 3+ pitting edema in bilat lower extremities, elevated BP and also with wet cough, which is new. MD notified that patient takes a diuretic at home and is not receiving here. New orders placed. See MAR for details.

## 2023-09-26 NOTE — PLAN OF CARE
Problem: Safety - Adult  Goal: Free from fall injury  9/26/2023 1112 by Anurag Adair  Outcome: Progressing  Note: Patient free from falls/ injury during duration of shift. Bed alarm on, call light w/in reach, bed in lowest position, non-skid socks on and fall sign posted outside door. Problem: Neurosensory - Adult  Goal: Achieves stable or improved neurological status  9/26/2023 1112 by Anurag Adair  Outcome: Progressing  Note: Neuro checks wdl. NIH 0. No presence of seizure activity. Problem: Pain  Goal: Verbalizes/displays adequate comfort level or baseline comfort level  9/26/2023 1112 by Anurag Adair  Outcome: Adequate for Discharge  Note: No complaint of pain at this time.

## 2023-09-26 NOTE — CARE COORDINATION
Patient is from home alone, independent pta, drives self. Patient plans to return home. No CM needs at this time. Family to transport. Neuro is following this patient. Possible discharge if medically stable may be in a couple of days. CM will continue to follow patient until discharge.   Electronically signed by Shyla Byrd RN on 9/26/2023 at 2:31 PM

## 2023-09-26 NOTE — PROGRESS NOTES
Pt A&Ox4. Up x1 standby to the bathroom and voids easily. Regular/diabetic diet, takes pills whole. Pt with 3+pitting edema noted in lowers, elevated BP, wet cough- MD notified. Home diuretic and medications restarted. PRN labetolol administered. IV infiltrated R hand, new IV placed by charge RN in L hand.  at start of shift, one time dose of regular insulin given per practitioner along with sliding scale and evening lantus. Pt with episode of \"head tingling and pressure\" she states was exactly how she felt when she came to the ED. No neuro changes or new deficits or aphasia noted. VSS.  at this time. Loco NP, Claire notified and at bedside to assess. Vitals:    09/26/23 0045   BP: (!) 167/76   Pulse: 76   Resp: 16   Temp:    SpO2: 99%        Standard safety measures in place.

## 2023-09-26 NOTE — PROGRESS NOTES
HDL 68 09/23/2023 05:35 AM    TRIG 55 09/23/2023 05:35 AM     Hemoglobin A1C:   Lab Results   Component Value Date/Time    LABA1C 7.7 09/23/2023 05:35 AM     TSH:   Lab Results   Component Value Date/Time    TSH 4.31 08/26/2022 09:10 AM     Troponin: No results found for: \"TROPONINT\"  Lactic Acid: No results for input(s): \"LACTA\" in the last 72 hours. BNP:   No results for input(s): \"PROBNP\" in the last 72 hours. UA:  Lab Results   Component Value Date/Time    NITRU Negative 09/22/2023 10:37 PM    COLORU Yellow 09/22/2023 10:37 PM    PHUR 5.0 09/22/2023 10:37 PM    PHUR 5.0 09/22/2023 10:37 PM    WBCUA 2 05/13/2016 02:40 PM    RBCUA 2 05/13/2016 02:40 PM    CLARITYU Clear 09/22/2023 10:37 PM    SPECGRAV >=1.030 09/22/2023 10:37 PM    LEUKOCYTESUR Negative 09/22/2023 10:37 PM    UROBILINOGEN 0.2 09/22/2023 10:37 PM    BILIRUBINUR Negative 09/22/2023 10:37 PM    BILIRUBINUR NEG 09/21/2023 02:47 PM    BLOODU Negative 09/22/2023 10:37 PM    GLUCOSEU Negative 09/22/2023 10:37 PM    KETUA Negative 09/22/2023 10:37 PM     Urine Cultures:   Lab Results   Component Value Date/Time    LABURIN  09/21/2023 05:15 PM     <10,000 CFU/ml mixed skin/urogenital jacinto.  No further workup    LABURIN 200mg/dL 03/28/2023 12:39 PM     Blood Cultures: No results found for: \"BC\"  No results found for: \"BLOODCULT2\"  Organism: No results found for: \"ORG\"      Electronically signed by Zuleyka Capone MD on 9/26/2023 at 9:27 AM

## 2023-09-27 LAB
ANION GAP SERPL CALCULATED.3IONS-SCNC: 8 MMOL/L (ref 3–16)
BASOPHILS # BLD: 0 K/UL (ref 0–0.2)
BASOPHILS NFR BLD: 0.8 %
BUN SERPL-MCNC: 17 MG/DL (ref 7–20)
CALCIUM SERPL-MCNC: 9.7 MG/DL (ref 8.3–10.6)
CHLORIDE SERPL-SCNC: 105 MMOL/L (ref 99–110)
CO2 SERPL-SCNC: 29 MMOL/L (ref 21–32)
CREAT SERPL-MCNC: 1 MG/DL (ref 0.6–1.2)
DEPRECATED RDW RBC AUTO: 15.1 % (ref 12.4–15.4)
EOSINOPHIL # BLD: 0.3 K/UL (ref 0–0.6)
EOSINOPHIL NFR BLD: 4.6 %
GFR SERPLBLD CREATININE-BSD FMLA CKD-EPI: >60 ML/MIN/{1.73_M2}
GLUCOSE BLD-MCNC: 154 MG/DL (ref 70–99)
GLUCOSE BLD-MCNC: 274 MG/DL (ref 70–99)
GLUCOSE BLD-MCNC: 281 MG/DL (ref 70–99)
GLUCOSE BLD-MCNC: 297 MG/DL (ref 70–99)
GLUCOSE SERPL-MCNC: 183 MG/DL (ref 70–99)
HCT VFR BLD AUTO: 34.5 % (ref 36–48)
HGB BLD-MCNC: 11.6 G/DL (ref 12–16)
LYMPHOCYTES # BLD: 1.2 K/UL (ref 1–5.1)
LYMPHOCYTES NFR BLD: 21.1 %
MCH RBC QN AUTO: 29.5 PG (ref 26–34)
MCHC RBC AUTO-ENTMCNC: 33.5 G/DL (ref 31–36)
MCV RBC AUTO: 87.9 FL (ref 80–100)
MONOCYTES # BLD: 0.6 K/UL (ref 0–1.3)
MONOCYTES NFR BLD: 10.5 %
NEUTROPHILS # BLD: 3.7 K/UL (ref 1.7–7.7)
NEUTROPHILS NFR BLD: 63 %
PERFORMED ON: ABNORMAL
PLATELET # BLD AUTO: 221 K/UL (ref 135–450)
PMV BLD AUTO: 9.1 FL (ref 5–10.5)
POTASSIUM SERPL-SCNC: 4.2 MMOL/L (ref 3.5–5.1)
RBC # BLD AUTO: 3.93 M/UL (ref 4–5.2)
SODIUM SERPL-SCNC: 142 MMOL/L (ref 136–145)
WBC # BLD AUTO: 5.9 K/UL (ref 4–11)

## 2023-09-27 PROCEDURE — 6370000000 HC RX 637 (ALT 250 FOR IP)

## 2023-09-27 PROCEDURE — 36415 COLL VENOUS BLD VENIPUNCTURE: CPT

## 2023-09-27 PROCEDURE — 85025 COMPLETE CBC W/AUTO DIFF WBC: CPT

## 2023-09-27 PROCEDURE — 80048 BASIC METABOLIC PNL TOTAL CA: CPT

## 2023-09-27 PROCEDURE — 6370000000 HC RX 637 (ALT 250 FOR IP): Performed by: STUDENT IN AN ORGANIZED HEALTH CARE EDUCATION/TRAINING PROGRAM

## 2023-09-27 PROCEDURE — 2060000000 HC ICU INTERMEDIATE R&B

## 2023-09-27 PROCEDURE — 86038 ANTINUCLEAR ANTIBODIES: CPT

## 2023-09-27 PROCEDURE — 92507 TX SP LANG VOICE COMM INDIV: CPT

## 2023-09-27 PROCEDURE — 2580000003 HC RX 258

## 2023-09-27 PROCEDURE — 6370000000 HC RX 637 (ALT 250 FOR IP): Performed by: NURSE PRACTITIONER

## 2023-09-27 RX ADMIN — AMLODIPINE BESYLATE 5 MG: 5 TABLET ORAL at 09:18

## 2023-09-27 RX ADMIN — ATORVASTATIN CALCIUM 80 MG: 80 TABLET, FILM COATED ORAL at 20:37

## 2023-09-27 RX ADMIN — INSULIN LISPRO 8 UNITS: 100 INJECTION, SOLUTION INTRAVENOUS; SUBCUTANEOUS at 13:02

## 2023-09-27 RX ADMIN — SODIUM CHLORIDE, PRESERVATIVE FREE 10 ML: 5 INJECTION INTRAVENOUS at 09:18

## 2023-09-27 RX ADMIN — SODIUM CHLORIDE, PRESERVATIVE FREE 10 ML: 5 INJECTION INTRAVENOUS at 20:37

## 2023-09-27 RX ADMIN — TRIAMTERENE AND HYDROCHLOROTHIAZIDE 1 TABLET: 37.5; 25 TABLET ORAL at 07:41

## 2023-09-27 RX ADMIN — INSULIN GLARGINE 22 UNITS: 100 INJECTION, SOLUTION SUBCUTANEOUS at 20:37

## 2023-09-27 RX ADMIN — LEVETIRACETAM 500 MG: 500 TABLET, FILM COATED ORAL at 09:18

## 2023-09-27 RX ADMIN — LEVETIRACETAM 500 MG: 500 TABLET, FILM COATED ORAL at 20:37

## 2023-09-27 RX ADMIN — INSULIN LISPRO 8 UNITS: 100 INJECTION, SOLUTION INTRAVENOUS; SUBCUTANEOUS at 17:46

## 2023-09-27 NOTE — PROGRESS NOTES
Patient is alert and oriented. Vital signs are stable. No changes in neuro assessment. Patient ambulates x1 SBA. Patient tolerates ambulation well. Bed is in the lowest position. Bed alarm is activated. Call light is within reach. Will continue to monitor and reassess.

## 2023-09-27 NOTE — PROGRESS NOTES
Physician Progress Note      Marti Stephenson  CSN #:                  [id number]  :                       1950  ADMIT DATE:       2023 2:35 AM  DISCH DATE:  RESPONDING  PROVIDER #:        Andi Bui CNP          QUERY TEXT:    Patient admitted with aphasia. Documentation reflects \"brain edema\" on   progress notes 23. If possible, please document in the progress notes and   discharge summary if brain edema was: The medical record reflects the following:  Risk Factors: aphasia, abnormal head CT  Clinical Indicators: per progress notes on  \"brain edema\". Head CT   -\"Mass effect in the left cerebral hemisphere with  decreased sulcation and mild compression of the left lateral ventricle. \"  Treatment: CBC, CMP, Head CT/MRI, CSF, Neurosurgery/Neurology consult, per   Neuro. recommendations-Neurologic exams  frequency: Floor: Q4H, Keep HOB >30 degrees, Seizure prophylaxis: Keppra 500mg   BID  Options provided:  -- brain edema confirmed after study  -- brain edema ruled out after study  -- Other - I will add my own diagnosis  -- Disagree - Not applicable / Not valid  -- Disagree - Clinically unable to determine / Unknown  -- Refer to Clinical Documentation Reviewer    PROVIDER RESPONSE TEXT:    brain edema confirmed after study.     Query created by: Kat Delacruz on 2023 11:04 AM      Electronically signed by:  Andi Bui CNP 2023 12:31 PM

## 2023-09-27 NOTE — PROGRESS NOTES
Speech Language Pathology  Facility/Department:[facility] ICU  Treatment session                                      Name: Jovan Baez  : 1950  MRN: [id number]    Patient Diagnosis(es):   Patient Active Problem List    Diagnosis Date Noted    Primary osteoarthritis of both knees 2022    Hyperuricemia 2022    Abnormal brain MRI 2023    Aphasia 2023    Body mass index (BMI) 50.0-59.9, adult ([address]) 2023    Venous insufficiency of both lower extremities 2020    Proliferative diabetic retinopathy, right eye ([address]) 10/07/2019    OAB (overactive bladder) 2018    Trigger ring finger of left hand 2018    Primary hypertension 2017    Post-menopausal 2017    Morbid obesity due to excess calories ([address]) 2017    Type 2 diabetes mellitus with stable proliferative retinopathy of right eye, with long-term current use of insulin ([address]) 2014     Past Medical History:   Diagnosis Date    Cataract     Diabetic retinopathy ([address])     High blood pressure     Proliferative diabetic retinopathy, right eye ([address])      Past Surgical History:   Procedure Laterality Date    CARPAL TUNNEL RELEASE  ,    Bilateral    CATARACT REMOVAL WITH IMPLANT      [facility]    COLONOSCOPY  2022    Dr. Mini Sanchez    COLONOSCOPY N/A 2022    COLONOSCOPY performed by Jaydon Lindquist MD at [address] Left 2018    left ring finger    HAND SURGERY  ,     Left x5    HYSTERECTOMY (CERVIX STATUS UNKNOWN) Right 1998    x1 ovary removed    SHOULDER SURGERY      Right     History of Present Illness  Per admitting H&P: 2023  Sarina Connolly is  72 y.o. female with hx of T2DM, diabetic retinopathy, and HTN who presents for evaluation of altered mental status. On examination, patient with word finding difficulties and some receptive aphasia.  Collateral history appropriate. 9/24: Pt able to recall/problem solve that \"I shouldn't eat ketchup on my sandwich because my blood sugars were high\". Recall 3 step command with min cues. Cont goal  9/27: not targeted as pt had company and wanting to eat lunch as well  Cont goal    Goal 2: Patient will complete graded naming tasks with 80% accuracy given min cues. 9/24:   Pt reported remembering words may be worse today (d/w nursing). Pt able to name pictures of common objects with min -mod cues. Named 9-10 animals in one minute Pt not consistently aware of literal/semantic paraphasic errors (carnation/fátima, Binoculis/binoculars). Antonym naming (simple-mod) with 100% accuracy with min cues. Synonym naming: min mod cues required. Educated pt and provided written instructions on strategies to assist with word finding. COnt goal  927: pt demonstrated single instance of word finding this date, pt and pt friend agree that speech significantly improved. Pt had good error awareness and used strategy independently for word recall. Educated pt to additional strategies for word finding through verbal instructions and demonstration. Pt and family friend stated comprehension  Goal met, cont to ensure consistency    Goal 3: Patient will follow 2-step directions with 100% accuracy, no cues. 9/24:  Pt able to follow 2 step command with 100% accuracy, no cues or need for repetition. Min cues for 3 step command. Cont goal  9/27: pt following 2 step directions accurately  Goal met    Goal 4: Patient will complete reading tasks with 100% accuracy, no cues. 9/24: Not formally addressed.    9/27: pt read short paragraph level material accurately and with good understanding  Goal met,    Total treatment time: 15 minutes    Plan:  Continue 2-3 x/wk  Recommended diet: regular texture solids / thin liquids / meds PO  Discharge Recommendation: TBD pending pt cont progress  Discussed with RN, [place]  Needs met prior to leaving room, call light

## 2023-09-27 NOTE — PLAN OF CARE
Problem: Discharge Planning  Goal: Discharge to home or other facility with appropriate resources  Outcome: Progressing  Note: Discharge barriers include pending CSF studies and repeat MRI testing. Neurology and internal medicine teams following. Problem: Pain  Goal: Verbalizes/displays adequate comfort level or baseline comfort level  9/27/2023 1036 by [facility], RN  Outcome: Progressing  Note: Pt has denied pain this shift. Problem: Safety - Adult  Goal: Free from fall injury  9/27/2023 1036 by [facility], RN  Outcome: Progressing  Note: Pt has remained free of fall injury this shift. When ambulating x1 stand-by assist, gait belt and gripper socks applied for safety. Problem: Neurosensory - Adult  Goal: Achieves stable or improved neurological status  9/27/2023 1036 by [facility], RN  Outcome: Progressing  Note: NIHSS 0. Pt denies numbness and tingling.       Problem: Chronic Conditions and Co-morbidities  Goal: Patient's chronic conditions and co-morbidity symptoms are monitored and maintained or improved  9/27/2023 1036 by [facility], RN  Outcome: Progressing  Note: POCT glucose WNL this AM.

## 2023-09-27 NOTE — PLAN OF CARE
Problem: Pain  Goal: Verbalizes/displays adequate comfort level or baseline comfort level  9/27/2023 0042 by Haider Burden RN  Outcome: Progressing     Problem: Safety - Adult  Goal: Free from fall injury  9/27/2023 0042 by Haider Burden RN  Outcome: Progressing    Problem: Neurosensory - Adult  Goal: Achieves stable or improved neurological status  9/27/2023 0042 by Haider Burden RN  Outcome: Progressing    Problem: Chronic Conditions and Co-morbidities  Goal: Patient's chronic conditions and co-morbidity symptoms are monitored and maintained or improved  Outcome: Progressing

## 2023-09-27 NOTE — PROGRESS NOTES
V2.0    [facility]      Name:  Lucio Baez /Age/Sex: 1950  (72 y.o. female)   MRN & CSN:  [id number] & [id number] Encounter Date/Time: 2023 9:16 AM EDT   Location:  6194/1050-33 PCP: Rollo Baumgarten, [address] [address] Day: 5    Assessment and Recommendations   Magdiel Bustamante is a 72 y.o. female with pmh of HTN and DM who presents with aphasia episode of uncertain etiology    #Aphasia, uncertain etiology, initially thought to be CVA but MR brain not c/w CVA, possible seizures vs. Encephalitis  - Neuro consulted  - Obtained LP : -Not consistent with meningitis. Acyclovir discontinued. - Obtained MR brain with contrast: stable appearance of edematous changes in L temporal, occipital lobes and thalamus  - PT/OT/SLP ordered  - Continue neuro checks  -Neurosurgery on board.  -Plan to repeat MRI on 2023 to evaluate progression of previous brain MRI findings    #HTN - Resume home antihypertensives  #Type II DM - Glargine + SSI for BG control: increase glargine tonight discussed with patient agreeable to changes    Diet ADULT DIET; Regular; 4 carb choices (60 gm/meal); regular food/ thin liquids per ST   DVT Prophylaxis [] Lovenox, []  Heparin, [] SCDs, [] Ambulation,  [] Eliquis, [] Xarelto  [] Coumadin   Code Status Full Code   Disposition From: Home  Expected Disposition: Home  Estimated Date of Discharge: 1-2 days  Patient requires continued admission due to neuro eval   Surrogate Decision Maker/ BERTA Baez, child     Personally reviewed Lab Studies and Imaging       Subjective:     Chief Complaint: Aphasia    Magdiel Bustamante is a 72 y.o. female w/ HTN and DM who presented with episode of aphasia and had what initially appeared to be acute L MCA stroke and admitted for further evaluation. Seen and examined in the morning. She states she is bored. Denies any neurological symptoms currently. Her speech is clear.   Does not

## 2023-09-27 NOTE — PROGRESS NOTES
NEUROLOGY / NEUROCRITICAL CARE PROGRESS NOTE       Patient Name: Latonya Koch YOB: 1950   Sex: Female Age: 72 yrs     CC / Reason for Consult: Aphasia    Subjective / ROS / Updates over last 24 hours:  No complaints  Up in a chair talking to family  Doing well neurologically    HISTORY   81yo woman with HTN and DM. Presented to OSH with language difficulty. As documented elsewhere, she was to her baseline when talking to a friend on the phone yesterday at about 21 . However, she apparently spoke to the same friend at about 2030 last night and told her she wasn't feeling well. It was at that point that it was noted she was having difficulty with language. Her friend noted that she was not making sense when she spoke. She told her to call 911, but pt decided to drive herself to the ER instead. In the ER at about 2117 she was noted to have language impairment. Code stroke was called. She was not answering questions appropriately but was nodding yes/no appropriately. BP was as high as 222/90. Head CT showed an apparent subacute infarct in left MCA territory with loss of gray-white junction in that region and mass effect against the left lateral ventricle. There was no midline shift. CTA was unremarkable. TNK was not offered due to time since last known normal. At some point in the ER she seemed to have a fluctuating level of alertness and at times would not respond or answer questions. She was given a total of 3g of Keppra. Upon arrival here she was appreciated to be globally aphasic and with some right arm weakness. Blood pressure was 127/50. When seen by Neurology NP early this morning, she was improved. She was able to state her name. She got the year wrong and was unable to describe why she was in the hospital nor where she was. She could not name objects. Currently, she feels to her baseline.  Her son and friend at the bedside do not feel they have noticed anything any

## 2023-09-28 ENCOUNTER — TELEPHONE (OUTPATIENT)
Dept: FAMILY MEDICINE CLINIC | Age: 73
End: 2023-09-28

## 2023-09-28 DIAGNOSIS — E11.3551 TYPE 2 DIABETES MELLITUS WITH STABLE PROLIFERATIVE RETINOPATHY OF RIGHT EYE, WITH LONG-TERM CURRENT USE OF INSULIN (HCC): ICD-10-CM

## 2023-09-28 DIAGNOSIS — Z79.4 TYPE 2 DIABETES MELLITUS WITH STABLE PROLIFERATIVE RETINOPATHY OF RIGHT EYE, WITH LONG-TERM CURRENT USE OF INSULIN (HCC): ICD-10-CM

## 2023-09-28 LAB
ANA SER QL IA: NEGATIVE
ANION GAP SERPL CALCULATED.3IONS-SCNC: 7 MMOL/L (ref 3–16)
BASOPHILS # BLD: 0.1 K/UL (ref 0–0.2)
BASOPHILS NFR BLD: 1.1 %
BUN SERPL-MCNC: 20 MG/DL (ref 7–20)
CALCIUM SERPL-MCNC: 9.7 MG/DL (ref 8.3–10.6)
CHLORIDE SERPL-SCNC: 103 MMOL/L (ref 99–110)
CO2 SERPL-SCNC: 29 MMOL/L (ref 21–32)
CREAT SERPL-MCNC: 1 MG/DL (ref 0.6–1.2)
DEPRECATED RDW RBC AUTO: 15.4 % (ref 12.4–15.4)
EOSINOPHIL # BLD: 0.2 K/UL (ref 0–0.6)
EOSINOPHIL NFR BLD: 4.2 %
GFR SERPLBLD CREATININE-BSD FMLA CKD-EPI: >60 ML/MIN/{1.73_M2}
GLUCOSE BLD-MCNC: 196 MG/DL (ref 70–99)
GLUCOSE BLD-MCNC: 247 MG/DL (ref 70–99)
GLUCOSE BLD-MCNC: 316 MG/DL (ref 70–99)
GLUCOSE BLD-MCNC: 330 MG/DL (ref 70–99)
GLUCOSE SERPL-MCNC: 216 MG/DL (ref 70–99)
HCT VFR BLD AUTO: 34.2 % (ref 36–48)
HGB BLD-MCNC: 11.3 G/DL (ref 12–16)
HSV1 DNA CSF QL NAA+PROBE: NOT DETECTED
HSV2 DNA CSF QL NAA+PROBE: NOT DETECTED
LYMPHOCYTES # BLD: 1 K/UL (ref 1–5.1)
LYMPHOCYTES NFR BLD: 19.3 %
MCH RBC QN AUTO: 28.9 PG (ref 26–34)
MCHC RBC AUTO-ENTMCNC: 33.1 G/DL (ref 31–36)
MCV RBC AUTO: 87.3 FL (ref 80–100)
MONOCYTES # BLD: 0.6 K/UL (ref 0–1.3)
MONOCYTES NFR BLD: 10.7 %
NEUTROPHILS # BLD: 3.5 K/UL (ref 1.7–7.7)
NEUTROPHILS NFR BLD: 64.7 %
OLIGOCLONAL BANDS CSF QL: 0
OLIGOCLONAL BANDS SERPL QL: 0
PERFORMED ON: ABNORMAL
PLATELET # BLD AUTO: 210 K/UL (ref 135–450)
PMV BLD AUTO: 9.3 FL (ref 5–10.5)
POTASSIUM SERPL-SCNC: 4.1 MMOL/L (ref 3.5–5.1)
PROT PATTERN CSF ELPH-IMP: NORMAL
RBC # BLD AUTO: 3.91 M/UL (ref 4–5.2)
SODIUM SERPL-SCNC: 139 MMOL/L (ref 136–145)
SPECIMEN SOURCE: NORMAL
VDRL CSF QL: NON REACTIVE
WBC # BLD AUTO: 5.4 K/UL (ref 4–11)

## 2023-09-28 PROCEDURE — 6370000000 HC RX 637 (ALT 250 FOR IP): Performed by: STUDENT IN AN ORGANIZED HEALTH CARE EDUCATION/TRAINING PROGRAM

## 2023-09-28 PROCEDURE — 2060000000 HC ICU INTERMEDIATE R&B

## 2023-09-28 PROCEDURE — 6370000000 HC RX 637 (ALT 250 FOR IP)

## 2023-09-28 PROCEDURE — 2580000003 HC RX 258

## 2023-09-28 PROCEDURE — 80048 BASIC METABOLIC PNL TOTAL CA: CPT

## 2023-09-28 PROCEDURE — 36415 COLL VENOUS BLD VENIPUNCTURE: CPT

## 2023-09-28 PROCEDURE — 85025 COMPLETE CBC W/AUTO DIFF WBC: CPT

## 2023-09-28 PROCEDURE — 6370000000 HC RX 637 (ALT 250 FOR IP): Performed by: NURSE PRACTITIONER

## 2023-09-28 RX ORDER — PROCHLORPERAZINE 25 MG/1
SUPPOSITORY RECTAL
Qty: 1 EACH | Refills: 0 | Status: CANCELLED | OUTPATIENT
Start: 2023-09-28

## 2023-09-28 RX ORDER — ACYCLOVIR 400 MG/1
TABLET ORAL
Qty: 1 EACH | Refills: 2 | Status: SHIPPED | OUTPATIENT
Start: 2023-09-28

## 2023-09-28 RX ADMIN — AMLODIPINE BESYLATE 5 MG: 5 TABLET ORAL at 09:11

## 2023-09-28 RX ADMIN — TRIAMTERENE AND HYDROCHLOROTHIAZIDE 1 TABLET: 37.5; 25 TABLET ORAL at 09:11

## 2023-09-28 RX ADMIN — LEVETIRACETAM 500 MG: 500 TABLET, FILM COATED ORAL at 09:11

## 2023-09-28 RX ADMIN — ATORVASTATIN CALCIUM 80 MG: 80 TABLET, FILM COATED ORAL at 20:56

## 2023-09-28 RX ADMIN — SODIUM CHLORIDE, PRESERVATIVE FREE 10 ML: 5 INJECTION INTRAVENOUS at 20:58

## 2023-09-28 RX ADMIN — INSULIN GLARGINE 22 UNITS: 100 INJECTION, SOLUTION SUBCUTANEOUS at 20:56

## 2023-09-28 RX ADMIN — SODIUM CHLORIDE, PRESERVATIVE FREE 10 ML: 5 INJECTION INTRAVENOUS at 09:14

## 2023-09-28 RX ADMIN — INSULIN LISPRO 4 UNITS: 100 INJECTION, SOLUTION INTRAVENOUS; SUBCUTANEOUS at 13:13

## 2023-09-28 RX ADMIN — INSULIN LISPRO 12 UNITS: 100 INJECTION, SOLUTION INTRAVENOUS; SUBCUTANEOUS at 17:52

## 2023-09-28 RX ADMIN — INSULIN LISPRO 4 UNITS: 100 INJECTION, SOLUTION INTRAVENOUS; SUBCUTANEOUS at 22:41

## 2023-09-28 RX ADMIN — LEVETIRACETAM 500 MG: 500 TABLET, FILM COATED ORAL at 20:55

## 2023-09-28 NOTE — PROGRESS NOTES
Pt alert and oriented x4. RN spoke with MRI who said MRI would be completed tonight and that they are booked up today with outpatient appointments. Pt has had adequate PO intake and UOP this shift. All fall prevention measures remain in place.

## 2023-09-28 NOTE — PROGRESS NOTES
Speech-Language Pathology    Spoke with patient bedside; she denies any further word finding/communication difficulty, and declined any further need for speech therapy. Will sign off. Please re-refer if new concerns arise.     Ramiro Fonseca [id number]  Ph. # [phone]

## 2023-09-28 NOTE — PROGRESS NOTES
NEUROLOGY / NEUROCRITICAL CARE PROGRESS NOTE       Patient Name: Danny Soto YOB: 1950   Sex: Female Age: 72 yrs     CC / Reason for Consult: Aphasia    Subjective / ROS / Updates over last 24 hours:  No complaints      HISTORY   81yo woman with HTN and DM. Presented to OSH with language difficulty. As documented elsewhere, she was to her baseline when talking to a friend on the phone yesterday at about 21 . However, she apparently spoke to the same friend at about 2030 last night and told her she wasn't feeling well. It was at that point that it was noted she was having difficulty with language. Her friend noted that she was not making sense when she spoke. She told her to call 911, but pt decided to drive herself to the ER instead. In the ER at about 2117 she was noted to have language impairment. Code stroke was called. She was not answering questions appropriately but was nodding yes/no appropriately. BP was as high as 222/90. Head CT showed an apparent subacute infarct in left MCA territory with loss of gray-white junction in that region and mass effect against the left lateral ventricle. There was no midline shift. CTA was unremarkable. TNK was not offered due to time since last known normal. At some point in the ER she seemed to have a fluctuating level of alertness and at times would not respond or answer questions. She was given a total of 3g of Keppra. Upon arrival here she was appreciated to be globally aphasic and with some right arm weakness. Blood pressure was 127/50. When seen by Neurology NP early this morning, she was improved. She was able to state her name. She got the year wrong and was unable to describe why she was in the hospital nor where she was. She could not name objects. Currently, she feels to her baseline. Her son and friend at the bedside do not feel they have noticed anything any different about her.  Nursing reports that with prolonged conversation, the more she has to come up with her own thoughts, the harder it is for her to convey them and she is making paraphasias. she is unsure what would have precipitated these symptoms, other than the above. she feels that nothing makes them better and nothing makes them worse. she rates the symptoms as severe. she denies any other associated symptoms including no HA, no other speech/language difficulties, no swallowing difficulties, no visual changes, no diplopia, no hearing loss, no tinnitus, no vertigo, no imbalance, no light-headedness, no focal weakness, no sensory changes, no nausea or vomiting. she has never had this problem before. There is no history of this problem or anything similar in her family members. Allergies Allergies   Allergen Reactions    Cephalexin Itching     Can take Amoxil    Losartan Other (See Comments)     lightheaded      Diet ADULT DIET;  Regular; 4 carb choices (60 gm/meal); regular food/ thin liquids per ST   Isolation No active isolations     LABS   Metabolic Panel Recent Labs     09/25/23  1024 09/26/23  0746 09/27/23  0753 09/28/23  0636   NA  --  142 142 139   K  --  4.2 4.2 4.1   CL  --  105 105 103   CO2  --  30 29 29   BUN  --  18 17 20   CREATININE  --  1.0 1.0 1.0   GLUCOSE  --  225* 183* 216*   CALCIUM  --  9.6 9.7 9.7   LABALBU 3968  --   --   --         CBC / Coags Recent Labs     09/26/23  0746 09/27/23  0753 09/28/23  0636   WBC 5.0 5.9 5.4   RBC 3.72* 3.93* 3.91*   HGB 10.9* 11.6* 11.3*   HCT 32.7* 34.5* 34.2*    221 210        Other Lab Results   Component Value Date/Time    LABA1C 7.7 09/23/2023 05:35 AM    LDLCALC 80 09/23/2023 05:35 AM    TRIG 55 09/23/2023 05:35 AM    TSH 4.31 08/26/2022 09:10 AM    COVID19 Not Detected 09/22/2023 09:56 PM     No results found for: \"PHENYTOIN\", \"KEPPRA\", \"LACOSA\", \"LAMO\", \"VALPROATE\", \"LACTSEPSIS\", \"LACTA\"     CURRENT SCHEDULED MEDICATIONS   Inpatient Medications     hydrOXYzine pamoate, 25 mg, Oral, Once

## 2023-09-28 NOTE — TELEPHONE ENCOUNTER
Lilly Laureano is calling to get an RX for the [facility] . They have the sensor but not the .       Please fax to [phone]

## 2023-09-28 NOTE — CARE COORDINATION
Patient is from home alone, independent pta. No CM needs anticipated. Patient plans to return home at discharge.       Yolanda Hernandez RN, BSN,    Ortho/Neuro   [phone]

## 2023-09-28 NOTE — PROGRESS NOTES
V2.0    [facility] Progress Note      Name:  Andrew Baez /Age/Sex: 1950  (72 y.o. female)   MRN & CSN:  [id number] & [id number] Encounter Date/Time: 2023 9:16 AM EDT   Location:  1200/0890-72 PCP: Madhavi Varner, [address] [address] Day: 6    Assessment and Recommendations   Mark Tomlin is a 72 y.o. female with pmh of HTN and DM who presents with aphasia episode of uncertain etiology    #Aphasia, uncertain etiology, initially thought to be CVA but MR brain not c/w CVA, possible seizures vs. Encephalitis  - Neuro consulted  - Obtained LP : -Not consistent with meningitis. Acyclovir discontinued. - Obtained MR brain with contrast: stable appearance of edematous changes in L temporal, occipital lobes and thalamus  - PT/OT/SLP ordered  - Continue neuro checks  -Neurosurgery on board.  -Waiting on repeat MRI brain with without contrast from today. #HTN - Resume home antihypertensives  #Type II DM - Glargine + SSI for BG control: increase glargine tonight discussed with patient agreeable to changes    Diet ADULT DIET; Regular; 4 carb choices (60 gm/meal); regular food/ thin liquids per ST   DVT Prophylaxis [] Lovenox, []  Heparin, [] SCDs, [] Ambulation,  [] Eliquis, [] Xarelto  [] Coumadin   Code Status Full Code   Disposition From: Home  Expected Disposition: Home  Estimated Date of Discharge: 1-2 days  Patient requires continued admission due to neuro eval   Surrogate Decision Maker/ BERTA aBez, child     Personally reviewed Lab Studies and Imaging       Subjective:     Chief Complaint: Aphasia    Mark Tomlin is a 72 y.o. female w/ HTN and DM who presented with episode of aphasia and had what initially appeared to be acute L MCA stroke and admitted for further evaluation. Seen and examined in the morning. Denies any neurological symptoms currently. Her speech is clear. Does not report any weakness or paresthesias.   Denies headache

## 2023-09-28 NOTE — PROGRESS NOTES
Pt is A&Ox4. Stable vitals. No acute neuro changes. NIH is 0.pt denies Pain, nausea and vomiting. Voiding adequately to the bathroom. All fall precaution are in place. call light within a reach. Will continue monitor . Angie Stephens ...

## 2023-09-28 NOTE — PLAN OF CARE
Problem: Discharge Planning  Goal: Discharge to home or other facility with appropriate resources  9/27/2023 2321 by Regis Mckeon RN  Outcome: Progressing    Problem: Pain  Goal: Verbalizes/displays adequate comfort level or baseline comfort level  9/27/2023 2321 by Regis Mckeon RN  Outcome: Progressing    Problem: Safety - Adult  Goal: Free from fall injury  9/27/2023 2321 by Regis Mckeon RN  Outcome: Progressing    Problem: Neurosensory - Adult  Goal: Achieves stable or improved neurological status  9/27/2023 2321 by Regis Mckeon RN  Outcome: Progressing  ==     Problem: ABCDS Injury Assessment  Goal: Absence of physical injury  Outcome: Progressing     =

## 2023-09-28 NOTE — PLAN OF CARE
Problem: Pain  Goal: Verbalizes/displays adequate comfort level or baseline comfort level  9/28/2023 0821 by Christopher Maxwell RN  Outcome: Progressing   Pt will verbalize pain this shift. Problem: Safety - Adult  Goal: Free from fall injury  9/28/2023 0821 by Christopher Maxwell RN  Outcome: Progressing   Pt will be free from falls and injury this shift. Problem: Neurosensory - Adult  Goal: Achieves stable or improved neurological status  9/28/2023 0821 by Christopher Maxwell RN  Outcome: Progressing   Neuro checks will be preformed and pt will have no acute neurological changes this shift.

## 2023-09-29 ENCOUNTER — APPOINTMENT (OUTPATIENT)
Dept: MRI IMAGING | Age: 73
End: 2023-09-29
Attending: INTERNAL MEDICINE
Payer: MEDICARE

## 2023-09-29 VITALS
HEIGHT: 64 IN | WEIGHT: 293 LBS | HEART RATE: 86 BPM | OXYGEN SATURATION: 97 % | TEMPERATURE: 98 F | DIASTOLIC BLOOD PRESSURE: 74 MMHG | BODY MASS INDEX: 50.02 KG/M2 | SYSTOLIC BLOOD PRESSURE: 143 MMHG | RESPIRATION RATE: 18 BRPM

## 2023-09-29 LAB
ANION GAP SERPL CALCULATED.3IONS-SCNC: 6 MMOL/L (ref 3–16)
BASOPHILS # BLD: 0 K/UL (ref 0–0.2)
BASOPHILS NFR BLD: 0.8 %
BUN SERPL-MCNC: 22 MG/DL (ref 7–20)
CALCIUM SERPL-MCNC: 9.7 MG/DL (ref 8.3–10.6)
CHLORIDE SERPL-SCNC: 104 MMOL/L (ref 99–110)
CO2 SERPL-SCNC: 30 MMOL/L (ref 21–32)
CREAT SERPL-MCNC: 0.9 MG/DL (ref 0.6–1.2)
DEPRECATED RDW RBC AUTO: 15.3 % (ref 12.4–15.4)
EOSINOPHIL # BLD: 0.2 K/UL (ref 0–0.6)
EOSINOPHIL NFR BLD: 3.6 %
GFR SERPLBLD CREATININE-BSD FMLA CKD-EPI: >60 ML/MIN/{1.73_M2}
GLUCOSE BLD-MCNC: 216 MG/DL (ref 70–99)
GLUCOSE BLD-MCNC: 288 MG/DL (ref 70–99)
GLUCOSE SERPL-MCNC: 220 MG/DL (ref 70–99)
HCT VFR BLD AUTO: 34.3 % (ref 36–48)
HGB BLD-MCNC: 11.4 G/DL (ref 12–16)
LYMPHOCYTES # BLD: 0.9 K/UL (ref 1–5.1)
LYMPHOCYTES NFR BLD: 18.8 %
MCH RBC QN AUTO: 29.3 PG (ref 26–34)
MCHC RBC AUTO-ENTMCNC: 33.3 G/DL (ref 31–36)
MCV RBC AUTO: 88 FL (ref 80–100)
MONOCYTES # BLD: 0.6 K/UL (ref 0–1.3)
MONOCYTES NFR BLD: 12 %
NEUTROPHILS # BLD: 3.1 K/UL (ref 1.7–7.7)
NEUTROPHILS NFR BLD: 64.8 %
PERFORMED ON: ABNORMAL
PERFORMED ON: ABNORMAL
PLATELET # BLD AUTO: 215 K/UL (ref 135–450)
PMV BLD AUTO: 9.7 FL (ref 5–10.5)
POTASSIUM SERPL-SCNC: 4.4 MMOL/L (ref 3.5–5.1)
RBC # BLD AUTO: 3.89 M/UL (ref 4–5.2)
SODIUM SERPL-SCNC: 140 MMOL/L (ref 136–145)
SPECIMEN SOURCE: NORMAL
VZV DNA SPEC QL NAA+PROBE: NOT DETECTED
WBC # BLD AUTO: 4.8 K/UL (ref 4–11)

## 2023-09-29 PROCEDURE — A9579 GAD-BASE MR CONTRAST NOS,1ML: HCPCS | Performed by: NURSE PRACTITIONER

## 2023-09-29 PROCEDURE — 70553 MRI BRAIN STEM W/O & W/DYE: CPT

## 2023-09-29 PROCEDURE — 85025 COMPLETE CBC W/AUTO DIFF WBC: CPT

## 2023-09-29 PROCEDURE — 6370000000 HC RX 637 (ALT 250 FOR IP)

## 2023-09-29 PROCEDURE — 80048 BASIC METABOLIC PNL TOTAL CA: CPT

## 2023-09-29 PROCEDURE — 6360000004 HC RX CONTRAST MEDICATION: Performed by: NURSE PRACTITIONER

## 2023-09-29 PROCEDURE — 36415 COLL VENOUS BLD VENIPUNCTURE: CPT

## 2023-09-29 PROCEDURE — 6370000000 HC RX 637 (ALT 250 FOR IP): Performed by: NURSE PRACTITIONER

## 2023-09-29 RX ORDER — ATORVASTATIN CALCIUM 80 MG/1
80 TABLET, FILM COATED ORAL NIGHTLY
Qty: 30 TABLET | Refills: 3 | Status: SHIPPED | OUTPATIENT
Start: 2023-09-29

## 2023-09-29 RX ORDER — LEVETIRACETAM 500 MG/1
500 TABLET ORAL EVERY 12 HOURS
Qty: 60 TABLET | Refills: 3 | Status: SHIPPED | OUTPATIENT
Start: 2023-09-29

## 2023-09-29 RX ADMIN — GADOTERIDOL 20 ML: 279.3 INJECTION, SOLUTION INTRAVENOUS at 03:03

## 2023-09-29 RX ADMIN — INSULIN LISPRO 8 UNITS: 100 INJECTION, SOLUTION INTRAVENOUS; SUBCUTANEOUS at 11:58

## 2023-09-29 RX ADMIN — AMLODIPINE BESYLATE 5 MG: 5 TABLET ORAL at 07:59

## 2023-09-29 RX ADMIN — GADOTERIDOL 20 ML: 279.3 INJECTION, SOLUTION INTRAVENOUS at 03:11

## 2023-09-29 RX ADMIN — TRIAMTERENE AND HYDROCHLOROTHIAZIDE 1 TABLET: 37.5; 25 TABLET ORAL at 06:41

## 2023-09-29 RX ADMIN — INSULIN LISPRO 4 UNITS: 100 INJECTION, SOLUTION INTRAVENOUS; SUBCUTANEOUS at 07:57

## 2023-09-29 RX ADMIN — LEVETIRACETAM 500 MG: 500 TABLET, FILM COATED ORAL at 07:59

## 2023-09-29 NOTE — PLAN OF CARE
Problem: Safety - Adult  Goal: Free from fall injury  9/29/2023 0709 by Yumiko Gaxiola RN  Outcome: Progressing   All fall precautions in place. Bed locked and in lowest position with alarm on. Overbed table and personal belonings within reach. Call light within reach and patient instructed to use call light for assistance. Non-skid socks on. Problem: Neurosensory - Adult  Goal: Absence of seizures  Outcome: Progressing   Seizure precautions in place. No seizure activity at this time.

## 2023-09-29 NOTE — PROGRESS NOTES
Pt discharged with family friend. IV removed. Education complete. Medications picked up at pharmacy. Denies needs at time of discharge.

## 2023-09-29 NOTE — PROGRESS NOTES
Pt is alert and oriented x4. VSS on room air. Pt voiding adequately via BRP. Ambulated SBA. Pt denies pain at this time. Seizure precautions in place. Fall precautions in place. Call light is within pt's reach. Plan of care is ongoing. MRI completed. Rifampin Pregnancy And Lactation Text: This medication is Pregnancy Category C and it isn't know if it is safe during pregnancy. It is also excreted in breast milk and should not be used if you are breast feeding.

## 2023-09-29 NOTE — CARE COORDINATION
Case Management Assessment            Discharge Note                    Date / Time of Note: 9/29/2023 2:17 PM                  Discharge Note Completed by: Rosetta Zapata RN    Patient Name: Ryan Baez   YOB: 1950  Diagnosis: AMS (altered mental status) [R41.82]  Stroke determined by clinical assessment [facility]) [I63.9]   Date / Time: 9/23/2023  2:35 AM    Current PCP: Asia Lopez [facility] [address] [place] patient: No    Hospitalization in the last 30 days: No       Advance Directives:  Code Status: Full Code  [place] DNR form completed and on chart: Not Indicated    Financial:  Payor: Reinaldo Covert / Plan: Lilia Day / Product Type: *No Product type* /      Pharmacy:    [facility] ([address] [facility] [address] [place] [address] [phone] - f [phone]  [facility] 1000 [facility] [address]  Phone: [phone] Fax: [address] [employer] 2000 [facility]), [address] [place] [address] [phone] - f [phone]  [address] [place] [address] [address]  Phone: [phone] Fax: [phone] [address] [facility], [address] [place] [address] [phone] - f [phone]  1000 [facility] [address]  Phone: [phone] Fax: [address] [phone] - f [phone]  [facility] [address]  [address] [place] [address],[address] [address]  Phone: [phone] Fax: [phone]    [facility] [id number] [place], [address] [phone] - f [phone]  [address] [place] [address] Drive [facility],Building [address] [address]  Phone: [phone] Fax: [phone]      Assistance purchasing medications?:    Assistance provided by Case Management: None at this time    Does patient want to participate in local refill/ meds to beds program?: Yes    Meds To Beds General Rules:  1.  Can ONLY be done Monday- Friday between agreement w/DC to Home today. IMM Letter given to Patient/Family/Significant other/Guardian/POA/by[de-identified] Daryl Miller  IMM Letter date given[de-identified] 09/29/23  IMM Letter time given[de-identified] 5230    Transportation:  Transportation PLAN for discharge: family   Mode of Transport: Private Car  Reason for medical transport: Not Applicable  Name of [employer]: Not Applicable  Time of Transport: tbd    Transport form completed: Not Indicated    Home Care:  [facility] ordered at discharge: Not [address]: Not Applicable  Orders faxed: No    Durable Medical Equipment:  DME Provider: n/a  Equipment obtained during hospitalization: n/a    Home Oxygen and Respiratory Equipment:  Oxygen needed at discharge?: Not [address]: Not Applicable  Portable tank available for discharge?: Not Indicated        Referrals made at [facility] for outpatient continued care:  Not Applicable    Additional CM Notes: Patient is discharging home, independent pta, declines any needs at this time. Family to transport home. COVID Result:    Lab Results   Component Value Date/Time    COVID19 Not Detected 09/22/2023 09:56 PM       The Plan for Transition of Care is related to the following treatment goals of AMS (altered mental status) [R41.82]  Stroke determined by clinical assessment [facility]) [I63.9]    The Patient and/or patient representative Annamarie Cantu and her family were provided with a choice of provider and agrees with the discharge plan Yes    Freedom of choice list was provided with basic dialogue that supports the patient's individualized plan of care/goals and shares the quality data associated with the providers.  Yes    Care Transitions patient: Yes    Camryn Hull RN  [address]  Case Management Department  Ph: [phone]  Fax: [phone]

## 2023-09-29 NOTE — PLAN OF CARE
Problem: Discharge Planning  Goal: Discharge to home or other facility with appropriate resources  Outcome: Progressing   Pt is active in discharge planning. Problem: Pain  Goal: Verbalizes/displays adequate comfort level or baseline comfort level  Outcome: Progressing   Pt denies pain at this time. Problem: Safety - Adult  Goal: Free from fall injury  Outcome: Progressing  Fall precautions in place. Bed alarm on. Bed locked in low position. Seizure precautions in place.

## 2023-09-30 LAB
WNV IGG CSF-ACNC: 0.07 IV
WNV IGM CSF-ACNC: 0 IV

## 2023-10-02 ENCOUNTER — CARE COORDINATION (OUTPATIENT)
Dept: CASE MANAGEMENT | Age: 73
End: 2023-10-02

## 2023-10-02 LAB
BACTERIA CSF CULT: NORMAL
GRAM STN SPEC: NORMAL

## 2023-10-02 NOTE — CARE COORDINATION
[facility] Transitions Initial Follow Up Call    Call within 2 business days of discharge: Yes    Patient:  Nixon Willis CAGE   Patient :  1950  MRN:  [id number]     Reason for Admission: Seizures, Encephalitis    Discharge Date:  23    RARS:       Transitions of Care Initial Call    Was this an external facility discharge? Discharge Facility: [address] [place] [address] to be reviewed by the provider   Additional needs identified to be addressed with provider:    no    AMB CC Provider Discharge Needs: none            Non-face-to-face services provided:    1ST CTC attempt to reach Pt regarding recent hospital discharge. CTC left voice recording with call back number requesting a call back.     Follow up appointments:    Future Appointments   Date Time Provider [address] [place] [address]   2023  1:00 PM 1425 [facility], [address],    Ángela Etienne RN  Care Transition Coordinator  Contact RLFTIV:[phone]

## 2023-10-03 ENCOUNTER — CARE COORDINATION (OUTPATIENT)
Dept: CASE MANAGEMENT | Age: 73
End: 2023-10-03

## 2023-10-03 NOTE — CARE COORDINATION
[facility] Transitions Initial Follow Up Call    Call within 2 business days of discharge: Yes    Patient:  Nixon Willis CAGE   Patient :  1950  MRN:  [id number]     Reason for Admission:  Seizure, Encephalitis  Discharge Date:  23    RARS:       Transitions of Care Initial Call    Was this an external facility discharge? Discharge Facility: [address] [place] [address] to be reviewed by the provider   Additional needs identified to be addressed with provider:    no    AMB CC Provider Discharge Needs: none            Non-face-to-face services provided:    2ND CTC attempt to reach Pt regarding recent hospital discharge. CTC left voice recording with call back number requesting a call back. CTN will close out CTN episode at this time.     Follow up appointments:    Future Appointments   Date Time Provider [address] [place] [address]   2023  1:00 PM 1425 [facility], APRN - 120 [facility],    Ángela Etienne, MAGDALENA  Care Transition Coordinator  Contact [phone]

## 2023-10-03 NOTE — PROGRESS NOTES
Physician Progress Note      Chris Nixon  [facility] #:                  [id number]  :                       1950  ADMIT DATE:       2023 2:35 AM  [facility] DATE:        2023 2:49 PM  RESPONDING  PROVIDER #:        Pebbles Farah MD          QUERY TEXT:    Patient admitted with Aphasia. Documentation reflects \"Encephalitis\" in MD   notes 23. If possible, please document in the progress notes and   discharge summary if Encephalitis was: The medical record reflects the following:  Risk Factors: aphasia episode of uncertain etiology; DM  Clinical Indicators: Per progress notes \"CSF reassuring against infection. Nothing to suggest vasculitis - CTA w/o classic findings,  no ischemic changes on MRI. 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     Query created by: Jillian Rizzo on 2023 3:45 PM      Electronically signed by:  Pebbles Farah MD 10/3/2023 7:27 AM

## 2023-10-05 ENCOUNTER — TELEPHONE (OUTPATIENT)
Dept: FAMILY MEDICINE CLINIC | Age: 73
End: 2023-10-05

## 2023-10-05 NOTE — TELEPHONE ENCOUNTER
Description: Female : 1950 Provider: Jose Mora RN Department: [address] [place] [address] Call    Encounter Information    Provider [address]   10/2/2023 12:02 PM Jose Mora  NATALIZYB [facility] CASE [facility]     Call Documentation    No notes of this type exist for this encounter. Care Coor Phone Call    Jose Mora RN at 10/3/2023  9:17 AM    Status: [address] Transitions Initial Follow Up Call     Call within 2 business days of discharge: Yes     Patient:  Brenna OROZCO                                   Patient :  1950  MRN:  [id number]                                                    Reason for Admission:  Seizure, Encephalitis  Discharge Date:  23                                          RARS:         Transitions of Care Initial Call     Was this an external facility discharge? Discharge Facility: [address] [place] [address] to be reviewed by the provider    Additional needs identified to be addressed with provider:    sukhi     AMB CC Provider Discharge Needs: none              Non-face-to-face services provided:     2ND CTC attempt to reach Pt regarding recent hospital discharge. CTC left voice recording with call back number requesting a call back. CTN will close out CTN episode at this time. Follow up appointments:           Future Appointments   Date Time Provider [address]   2023  1:00 PM 1425 [facility], APRN - CNP [address]      Thank You,     Jose Mora RN  Care Transition Coordinator  Contact Number:[phone]                Description: Female : 1950 Provider: Jose Mora RN Department: [facility] Call    Encounter Information    Provider [address]   10/2/2023 12:02 PM Jose Mora  NATALIZYB [facility]     Call Documentation    No notes of this type exist for this encounter.  Care Coor Phone Call    Jose Mora RN at

## 2023-10-05 NOTE — TELEPHONE ENCOUNTER
Care Transitions Initial Follow Up Call    Outreach made within 2 business days of discharge: Yes    Patient: Renay Eisenmenger Cage Patient : 1950   MRN: [id number]  Reason for Admission: There are no discharge diagnoses documented for the most recent discharge. Discharge Date: 23       Spoke with: MAILE FOR PT TO CB AND SENT mobileo MESSAGE TO PT. [facility]    Discharge department/facility: [facility] Interactive Patient Contact:  Was patient able to fill all prescriptions:   Was patient instructed to bring all medications to the follow-up visit:   Is patient taking all medications as directed in the discharge summary?    Does patient understand their discharge instructions:   Does patient have questions or concerns that need addressed prior to 7-14 day follow up office visit:     Scheduled appointment with PCP within 7-14 days    Follow Up  Future Appointments   Date Time Provider [address] [place] [address]   2023 10:40 AM JANICE Conteh - CNP [address] TO PT. [facility]5 [address]

## 2023-10-06 NOTE — TELEPHONE ENCOUNTER
Care Transitions Initial Follow Up Call    Outreach made within 2 business days of discharge: Yes    Patient: Nereida Baez Patient : 1950   MRN: [id number]  Reason for Admission: There are no discharge diagnoses documented for the most recent discharge. Discharge Date: 23       Spoke with: PATIENT    Discharge department/facility: [facility]    TCM Interactive Patient Contact:  Was patient able to fill all prescriptions: Yes  Was patient instructed to bring all medications to the follow-up visit: Yes  Is patient taking all medications as directed in the discharge summary? Yes  Does patient understand their discharge instructions: Yes  Does patient have questions or concerns that need addressed prior to 7-14 day follow up office visit: no    Scheduled appointment with PCP within 7-14 days    Follow Up  Future Appointments   Date Time Provider [address] [place] [address]   10/17/2023  4:20 PM JANICE Linares CNP [address]   2023 10:40 AM JANICE Linares CNP [address]     PT RETURNED A MESSAGE VIA [address] FOR 10/17/2023 WITH James Marrero CNP.   [address] [facility] Cris Ríos [place]

## 2023-10-17 ENCOUNTER — OFFICE VISIT (OUTPATIENT)
Dept: FAMILY MEDICINE CLINIC | Age: 73
End: 2023-10-17

## 2023-10-17 VITALS
WEIGHT: 293 LBS | SYSTOLIC BLOOD PRESSURE: 128 MMHG | HEIGHT: 64 IN | BODY MASS INDEX: 50.02 KG/M2 | DIASTOLIC BLOOD PRESSURE: 56 MMHG

## 2023-10-17 DIAGNOSIS — Z09 HOSPITAL DISCHARGE FOLLOW-UP: ICD-10-CM

## 2023-10-17 DIAGNOSIS — R40.4 TRANSIENT ALTERATION OF AWARENESS: Primary | ICD-10-CM

## 2023-10-17 DIAGNOSIS — R93.0 ABNORMAL MRI OF HEAD: ICD-10-CM

## 2023-10-17 RX ORDER — EZETIMIBE 10 MG/1
10 TABLET ORAL DAILY
Qty: 90 TABLET | Refills: 0 | Status: SHIPPED | OUTPATIENT
Start: 2023-10-17 | End: 2024-01-15

## 2023-10-17 NOTE — PROGRESS NOTES
Post-Discharge Transitional Care  Follow Up      Lucio Baez   YOB: 1950    Date of Office Visit:  10/17/2023  Date of Hospital Admission: 9/23/23  Date of Hospital Discharge: 9/29/23  Risk of hospital readmission (high >=14%. Medium >=10%) :Readmission Risk Score: 10.1      Care management risk score Rising risk (score 2-5) and Complex Care (Scores >=6): No Risk Score On File     Non face to face  following discharge, date last encounter closed (first attempt may have been earlier): 10/05/2023    Call initiated 2 business days of discharge: Yes    ASSESSMENT/PLAN:   Hafsa Crowe was seen today for follow-up from hospital and stroke. Diagnoses and all orders for this visit:    Hospital discharge follow-up  -     ME DISCHARGE MEDS RECONCILED W/ CURRENT OUTPATIENT MED LIST    Transient alteration of awareness  Abnormal MRI of head  ER NOTES- INPATIENT NOTES-LABS AND IMAGING  ENCOURAGED TO FOLLOW UP WITH NEUROLOGIST  DUE TO [address] discharge follow-up  -     ME DISCHARGE MEDS RECONCILED W/ CURRENT OUTPATIENT MED LIST  Other orders  -     ezetimibe (ZETIA) 10 MG tablet; Take 1 tablet by mouth daily         Medical Decision Making: low complexity  No follow-ups on file. Subjective:   HPI:  Follow up of Hospital problems/diagnosis(es):   Hospital follow up 9/21   States she felt off  that day - she was cooking for a friend and started talking nonsense- she drove herself to the ER - was unable to speak  denies weakness - could not recall words-  this lasted several hours - feels like she slept through the whole thing- the next day she was back to normal -she denies any vision  changes- denies any deficits- headaches- currently taking keepra- lipitor 80 mg at this time states they make her legs hurt  Inpatient course: Discharge summary reviewed- see chart.     Interval history/Current status:     Patient Active Problem List   Diagnosis    Type 2 diabetes mellitus with stable

## 2023-11-10 ENCOUNTER — TELEPHONE (OUTPATIENT)
Dept: PHARMACY | Facility: CLINIC | Age: 73
End: 2023-11-10

## 2023-11-10 NOTE — TELEPHONE ENCOUNTER
[facility] HEALTH CLINICAL PHARMACY REVIEW: ADHERENCE  Identified care gap per [employer] fills at  [address] [place] [address] [place] : Statin adherence    Patient also appears to be prescribed: ACE/ARB and Diabetes    ASSESSMENT  ACE/ARB ADHERENCE    Insurance Records claims through 10/23/2023 (YTD [address] = 100% - PASSED):   Telmisartan 80mg daily last filled on 08/29/2023 for 90 day supply. Next refill due: 11/27/2023    Last Rx sent on 06/27/2023 for 90ds with 1 refills    Per Reconciled Dispense Report as of 11/10/2023:  Filled for 90ds on 08/29/2023     BP Readings from Last 3 Encounters:   10/17/23 (!) 128/56   09/29/23 (!) 143/74   09/23/23 120/63     Lab Results   Component Value Date/Time    LABGLOM >60 09/29/2023 05:32 AM     Lab Results   Component Value Date    CREATININE 0.9 09/29/2023     Estimated Creatinine Clearance: 81 mL/min (based on SCr of 0.9 mg/dL). Lab Results   Component Value Date    K 4.4 09/29/2023      DIABETES ADHERENCE    Metformin 1000mg 1 tab breakfast, 1/2 tablet bedtime     Last Rx sent on 06/27/2023 for 90ds with 1 refills    Per Reconciled Dispense Report as of 11/10/2023:  Filled for 90ds on 06/27/2023     Lab Results   Component Value Date    LABA1C 7.7 09/23/2023    LABA1C 7.7 06/27/2023    LABA1C 7.6 03/27/2023     NOTE A1c <9%     STATIN ADHERENCE    Insurance Records claims through 10/23/2023 ([place] = FIRST FILL; Potential Fail Date: 11/16/2023): Atorvastatin 80mg daily last filled on 09/29/2023 for 30 day supply.  Next refill due: 10/29/2023    Last Rx sent on 09/29/2023 for 30ds with 3 refills    Per Reconciled Dispense Report as of 11/10/2023:  Filled for 30ds on 09/29/2023     Lab Results   Component Value Date    CHOL 159 09/23/2023    TRIG 55 09/23/2023    HDL 68 (H) 09/23/2023    LDLCALC 80 09/23/2023     ALT   Date Value Ref Range Status   09/22/2023 14 10 - 40 U/L Final     AST   Date Value Ref Range Status   09/22/2023 17 15 - 37 U/L Final      PLAN  The following are

## 2023-11-24 ENCOUNTER — HOSPITAL ENCOUNTER (OUTPATIENT)
Dept: MRI IMAGING | Age: 73
Discharge: HOME | End: 2023-11-24
Attending: PSYCHIATRY & NEUROLOGY
Payer: MEDICARE

## 2023-11-24 DIAGNOSIS — C71.9 MALIGNANT NEOPLASM OF BRAIN, UNSPECIFIED LOCATION (HCC): ICD-10-CM

## 2023-11-24 LAB
ALBUMIN SERPL-MCNC: 4.1 G/DL (ref 3.4–5)
ALBUMIN/GLOB SERPL: 1.3 {RATIO} (ref 1.1–2.2)
ALP SERPL-CCNC: 87 U/L (ref 40–129)
ALT SERPL-CCNC: 13 U/L (ref 10–40)
ANION GAP SERPL CALCULATED.3IONS-SCNC: 8 MMOL/L (ref 3–16)
AST SERPL-CCNC: 16 U/L (ref 15–37)
BILIRUB SERPL-MCNC: 0.4 MG/DL (ref 0–1)
BUN SERPL-MCNC: 20 MG/DL (ref 7–20)
CALCIUM SERPL-MCNC: 9.9 MG/DL (ref 8.3–10.6)
CHLORIDE SERPL-SCNC: 105 MMOL/L (ref 99–110)
CO2 SERPL-SCNC: 26 MMOL/L (ref 21–32)
CREAT SERPL-MCNC: 1 MG/DL (ref 0.6–1.2)
GFR SERPLBLD CREATININE-BSD FMLA CKD-EPI: >60 ML/MIN/{1.73_M2}
GLUCOSE SERPL-MCNC: 188 MG/DL (ref 70–99)
POTASSIUM SERPL-SCNC: 5 MMOL/L (ref 3.5–5.1)
PROT SERPL-MCNC: 7.3 G/DL (ref 6.4–8.2)
SODIUM SERPL-SCNC: 139 MMOL/L (ref 136–145)

## 2023-11-24 PROCEDURE — A9577 INJ MULTIHANCE: HCPCS | Performed by: PSYCHIATRY & NEUROLOGY

## 2023-11-24 PROCEDURE — 80053 COMPREHEN METABOLIC PANEL: CPT

## 2023-11-24 PROCEDURE — 70553 MRI BRAIN STEM W/O & W/DYE: CPT

## 2023-11-24 PROCEDURE — 6360000004 HC RX CONTRAST MEDICATION: Performed by: PSYCHIATRY & NEUROLOGY

## 2023-11-24 PROCEDURE — 36415 COLL VENOUS BLD VENIPUNCTURE: CPT

## 2023-11-24 RX ADMIN — GADOBENATE DIMEGLUMINE 20 ML: 529 INJECTION, SOLUTION INTRAVENOUS at 10:33

## 2023-12-13 DIAGNOSIS — I10 ESSENTIAL HYPERTENSION: ICD-10-CM

## 2023-12-13 RX ORDER — TRIAMTERENE AND HYDROCHLOROTHIAZIDE 37.5; 25 MG/1; MG/1
1 TABLET ORAL DAILY
Qty: 90 TABLET | Refills: 1 | Status: SHIPPED | OUTPATIENT
Start: 2023-12-13

## 2023-12-21 ENCOUNTER — APPOINTMENT (OUTPATIENT)
Dept: MRI IMAGING | Age: 73
DRG: 025 | End: 2023-12-21
Attending: STUDENT IN AN ORGANIZED HEALTH CARE EDUCATION/TRAINING PROGRAM
Payer: MEDICARE

## 2023-12-21 ENCOUNTER — HOSPITAL ENCOUNTER (INPATIENT)
Age: 73
LOS: 3 days | Discharge: HOME | DRG: 025 | End: 2023-12-24
Attending: STUDENT IN AN ORGANIZED HEALTH CARE EDUCATION/TRAINING PROGRAM | Admitting: INTERNAL MEDICINE
Payer: MEDICARE

## 2023-12-21 DIAGNOSIS — Z79.4 TYPE 2 DIABETES MELLITUS WITHOUT COMPLICATION, WITH LONG-TERM CURRENT USE OF INSULIN (HCC): ICD-10-CM

## 2023-12-21 DIAGNOSIS — E11.9 TYPE 2 DIABETES MELLITUS WITHOUT COMPLICATION, WITH LONG-TERM CURRENT USE OF INSULIN (HCC): ICD-10-CM

## 2023-12-21 DIAGNOSIS — Z71.89 BRAIN TUMOR CONSULTATION: ICD-10-CM

## 2023-12-21 PROBLEM — R41.4 RIGHT-SIDED VISUAL NEGLECT: Status: ACTIVE | Noted: 2023-12-21

## 2023-12-21 PROBLEM — R41.82 AMS (ALTERED MENTAL STATUS): Status: ACTIVE | Noted: 2023-12-21

## 2023-12-21 PROBLEM — G93.89 BRAIN MASS: Status: ACTIVE | Noted: 2023-12-21

## 2023-12-21 LAB
ANION GAP SERPL CALCULATED.3IONS-SCNC: 7 MMOL/L (ref 3–16)
BASOPHILS # BLD: 0 K/UL (ref 0–0.2)
BASOPHILS NFR BLD: 1 %
BUN SERPL-MCNC: 18 MG/DL (ref 7–20)
CALCIUM SERPL-MCNC: 9.5 MG/DL (ref 8.3–10.6)
CHLORIDE SERPL-SCNC: 100 MMOL/L (ref 99–110)
CO2 SERPL-SCNC: 28 MMOL/L (ref 21–32)
CREAT SERPL-MCNC: 1.3 MG/DL (ref 0.6–1.2)
DEPRECATED RDW RBC AUTO: 15.4 % (ref 12.4–15.4)
EOSINOPHIL # BLD: 0 K/UL (ref 0–0.6)
EOSINOPHIL NFR BLD: 0.1 %
FLUAV RNA RESP QL NAA+PROBE: NOT DETECTED
FLUBV RNA RESP QL NAA+PROBE: NOT DETECTED
GFR SERPLBLD CREATININE-BSD FMLA CKD-EPI: 44 ML/MIN/{1.73_M2}
GLUCOSE BLD-MCNC: 158 MG/DL (ref 70–99)
GLUCOSE BLD-MCNC: 198 MG/DL (ref 70–99)
GLUCOSE BLD-MCNC: 213 MG/DL (ref 70–99)
GLUCOSE BLD-MCNC: 273 MG/DL (ref 70–99)
GLUCOSE BLD-MCNC: 333 MG/DL (ref 70–99)
GLUCOSE SERPL-MCNC: 177 MG/DL (ref 70–99)
HCT VFR BLD AUTO: 32 % (ref 36–48)
HGB BLD-MCNC: 10.6 G/DL (ref 12–16)
LYMPHOCYTES # BLD: 0.3 K/UL (ref 1–5.1)
LYMPHOCYTES NFR BLD: 6.2 %
MCH RBC QN AUTO: 29.2 PG (ref 26–34)
MCHC RBC AUTO-ENTMCNC: 33.2 G/DL (ref 31–36)
MCV RBC AUTO: 88.1 FL (ref 80–100)
MONOCYTES # BLD: 0.6 K/UL (ref 0–1.3)
MONOCYTES NFR BLD: 12.2 %
NEUTROPHILS # BLD: 3.9 K/UL (ref 1.7–7.7)
NEUTROPHILS NFR BLD: 80.5 %
PERFORMED ON: ABNORMAL
PLATELET # BLD AUTO: 196 K/UL (ref 135–450)
PMV BLD AUTO: 9.6 FL (ref 5–10.5)
POTASSIUM SERPL-SCNC: 4.8 MMOL/L (ref 3.5–5.1)
PROCALCITONIN SERPL IA-MCNC: 0.13 NG/ML (ref 0–0.15)
RBC # BLD AUTO: 3.63 M/UL (ref 4–5.2)
SARS-COV-2 RNA RESP QL NAA+PROBE: DETECTED
SODIUM SERPL-SCNC: 135 MMOL/L (ref 136–145)
WBC # BLD AUTO: 4.8 K/UL (ref 4–11)

## 2023-12-21 PROCEDURE — 80048 BASIC METABOLIC PNL TOTAL CA: CPT

## 2023-12-21 PROCEDURE — 70553 MRI BRAIN STEM W/O & W/DYE: CPT

## 2023-12-21 PROCEDURE — 6370000000 HC RX 637 (ALT 250 FOR IP): Performed by: STUDENT IN AN ORGANIZED HEALTH CARE EDUCATION/TRAINING PROGRAM

## 2023-12-21 PROCEDURE — 6370000000 HC RX 637 (ALT 250 FOR IP)

## 2023-12-21 PROCEDURE — 2580000003 HC RX 258

## 2023-12-21 PROCEDURE — 36415 COLL VENOUS BLD VENIPUNCTURE: CPT

## 2023-12-21 PROCEDURE — 99222 1ST HOSP IP/OBS MODERATE 55: CPT | Performed by: NURSE PRACTITIONER

## 2023-12-21 PROCEDURE — 2060000000 HC ICU INTERMEDIATE R&B

## 2023-12-21 PROCEDURE — 95705 EEG W/O VID 2-12 HR UNMNTR: CPT

## 2023-12-21 PROCEDURE — 84145 PROCALCITONIN (PCT): CPT

## 2023-12-21 PROCEDURE — 6360000002 HC RX W HCPCS: Performed by: NURSE PRACTITIONER

## 2023-12-21 PROCEDURE — 85025 COMPLETE CBC W/AUTO DIFF WBC: CPT

## 2023-12-21 PROCEDURE — 87636 SARSCOV2 & INF A&B AMP PRB: CPT

## 2023-12-21 PROCEDURE — APPNB30 APP NON BILLABLE TIME 0-30 MINS

## 2023-12-21 RX ORDER — INSULIN LISPRO 100 [IU]/ML
0-4 INJECTION, SOLUTION INTRAVENOUS; SUBCUTANEOUS NIGHTLY
Status: DISCONTINUED | OUTPATIENT
Start: 2023-12-21 | End: 2023-12-23

## 2023-12-21 RX ORDER — ONDANSETRON 2 MG/ML
4 INJECTION INTRAMUSCULAR; INTRAVENOUS EVERY 6 HOURS PRN
Status: DISCONTINUED | OUTPATIENT
Start: 2023-12-21 | End: 2023-12-24 | Stop reason: HOSPADM

## 2023-12-21 RX ORDER — LABETALOL HYDROCHLORIDE 5 MG/ML
10 INJECTION, SOLUTION INTRAVENOUS EVERY 4 HOURS PRN
Status: DISCONTINUED | OUTPATIENT
Start: 2023-12-21 | End: 2023-12-24 | Stop reason: HOSPADM

## 2023-12-21 RX ORDER — POTASSIUM CHLORIDE 7.45 MG/ML
10 INJECTION INTRAVENOUS PRN
Status: DISCONTINUED | OUTPATIENT
Start: 2023-12-21 | End: 2023-12-21

## 2023-12-21 RX ORDER — POTASSIUM CHLORIDE 20 MEQ/1
40 TABLET, EXTENDED RELEASE ORAL PRN
Status: DISCONTINUED | OUTPATIENT
Start: 2023-12-21 | End: 2023-12-21

## 2023-12-21 RX ORDER — ACETAMINOPHEN 325 MG/1
650 TABLET ORAL EVERY 6 HOURS PRN
Status: DISCONTINUED | OUTPATIENT
Start: 2023-12-21 | End: 2023-12-24 | Stop reason: HOSPADM

## 2023-12-21 RX ORDER — LEVALBUTEROL INHALATION SOLUTION 0.63 MG/3ML
0.63 SOLUTION RESPIRATORY (INHALATION) EVERY 8 HOURS PRN
Status: DISCONTINUED | OUTPATIENT
Start: 2023-12-21 | End: 2023-12-24 | Stop reason: HOSPADM

## 2023-12-21 RX ORDER — GLUCAGON 1 MG/ML
1 KIT INJECTION PRN
Status: DISCONTINUED | OUTPATIENT
Start: 2023-12-21 | End: 2023-12-24 | Stop reason: HOSPADM

## 2023-12-21 RX ORDER — ALLOPURINOL 300 MG/1
300 TABLET ORAL DAILY
Status: DISCONTINUED | OUTPATIENT
Start: 2023-12-21 | End: 2023-12-24 | Stop reason: HOSPADM

## 2023-12-21 RX ORDER — SODIUM CHLORIDE 9 MG/ML
INJECTION, SOLUTION INTRAVENOUS CONTINUOUS
Status: DISCONTINUED | OUTPATIENT
Start: 2023-12-22 | End: 2023-12-24 | Stop reason: HOSPADM

## 2023-12-21 RX ORDER — SODIUM CHLORIDE 0.9 % (FLUSH) 0.9 %
5-40 SYRINGE (ML) INJECTION EVERY 12 HOURS SCHEDULED
Status: DISCONTINUED | OUTPATIENT
Start: 2023-12-21 | End: 2023-12-24 | Stop reason: HOSPADM

## 2023-12-21 RX ORDER — BENZONATATE 100 MG/1
100 CAPSULE ORAL 3 TIMES DAILY PRN
Status: DISCONTINUED | OUTPATIENT
Start: 2023-12-21 | End: 2023-12-24 | Stop reason: HOSPADM

## 2023-12-21 RX ORDER — DEXTROSE MONOHYDRATE 100 MG/ML
INJECTION, SOLUTION INTRAVENOUS CONTINUOUS PRN
Status: DISCONTINUED | OUTPATIENT
Start: 2023-12-21 | End: 2023-12-24 | Stop reason: HOSPADM

## 2023-12-21 RX ORDER — SODIUM CHLORIDE 9 MG/ML
INJECTION, SOLUTION INTRAVENOUS PRN
Status: DISCONTINUED | OUTPATIENT
Start: 2023-12-21 | End: 2023-12-24 | Stop reason: HOSPADM

## 2023-12-21 RX ORDER — SODIUM CHLORIDE 0.9 % (FLUSH) 0.9 %
5-40 SYRINGE (ML) INJECTION PRN
Status: DISCONTINUED | OUTPATIENT
Start: 2023-12-21 | End: 2023-12-24 | Stop reason: HOSPADM

## 2023-12-21 RX ORDER — ACETAMINOPHEN 650 MG/1
650 SUPPOSITORY RECTAL EVERY 6 HOURS PRN
Status: DISCONTINUED | OUTPATIENT
Start: 2023-12-21 | End: 2023-12-24 | Stop reason: HOSPADM

## 2023-12-21 RX ORDER — ONDANSETRON 4 MG/1
4 TABLET, ORALLY DISINTEGRATING ORAL EVERY 8 HOURS PRN
Status: DISCONTINUED | OUTPATIENT
Start: 2023-12-21 | End: 2023-12-24 | Stop reason: HOSPADM

## 2023-12-21 RX ORDER — POLYETHYLENE GLYCOL 3350 17 G/17G
17 POWDER, FOR SOLUTION ORAL DAILY PRN
Status: DISCONTINUED | OUTPATIENT
Start: 2023-12-21 | End: 2023-12-24 | Stop reason: HOSPADM

## 2023-12-21 RX ORDER — INSULIN LISPRO 100 [IU]/ML
0-4 INJECTION, SOLUTION INTRAVENOUS; SUBCUTANEOUS
Status: DISCONTINUED | OUTPATIENT
Start: 2023-12-21 | End: 2023-12-23

## 2023-12-21 RX ORDER — MAGNESIUM SULFATE IN WATER 40 MG/ML
2000 INJECTION, SOLUTION INTRAVENOUS PRN
Status: DISCONTINUED | OUTPATIENT
Start: 2023-12-21 | End: 2023-12-21

## 2023-12-21 RX ORDER — LEVETIRACETAM 500 MG/5ML
1000 INJECTION, SOLUTION, CONCENTRATE INTRAVENOUS EVERY 12 HOURS
Status: DISCONTINUED | OUTPATIENT
Start: 2023-12-21 | End: 2023-12-24 | Stop reason: HOSPADM

## 2023-12-21 RX ORDER — TRIAMTERENE AND HYDROCHLOROTHIAZIDE 37.5; 25 MG/1; MG/1
1 TABLET ORAL DAILY
Status: DISCONTINUED | OUTPATIENT
Start: 2023-12-21 | End: 2023-12-24 | Stop reason: HOSPADM

## 2023-12-21 RX ORDER — AMLODIPINE BESYLATE 5 MG/1
5 TABLET ORAL DAILY
Status: DISCONTINUED | OUTPATIENT
Start: 2023-12-21 | End: 2023-12-24 | Stop reason: HOSPADM

## 2023-12-21 RX ADMIN — LEVETIRACETAM 1000 MG: 500 INJECTION, SOLUTION, CONCENTRATE INTRAVENOUS at 02:06

## 2023-12-21 RX ADMIN — BENZONATATE 100 MG: 100 CAPSULE ORAL at 21:46

## 2023-12-21 RX ADMIN — SODIUM CHLORIDE, PRESERVATIVE FREE 10 ML: 5 INJECTION INTRAVENOUS at 09:03

## 2023-12-21 RX ADMIN — LEVETIRACETAM 1000 MG: 500 INJECTION, SOLUTION, CONCENTRATE INTRAVENOUS at 14:03

## 2023-12-21 RX ADMIN — INSULIN LISPRO 2 UNITS: 100 INJECTION, SOLUTION INTRAVENOUS; SUBCUTANEOUS at 16:47

## 2023-12-21 RX ADMIN — INSULIN LISPRO 1 UNITS: 100 INJECTION, SOLUTION INTRAVENOUS; SUBCUTANEOUS at 12:18

## 2023-12-21 RX ADMIN — ACETAMINOPHEN 650 MG: 325 TABLET ORAL at 16:47

## 2023-12-21 RX ADMIN — TRIAMTERENE AND HYDROCHLOROTHIAZIDE 1 TABLET: 37.5; 25 TABLET ORAL at 12:19

## 2023-12-21 RX ADMIN — INSULIN LISPRO 4 UNITS: 100 INJECTION, SOLUTION INTRAVENOUS; SUBCUTANEOUS at 21:45

## 2023-12-21 RX ADMIN — AMLODIPINE BESYLATE 5 MG: 5 TABLET ORAL at 12:18

## 2023-12-21 NOTE — H&P
Internal Medicine H&P    Date: 2023   Patient: Maryann Baez   Patient : 1950     CC: No chief complaint on file. Subjective     HPI:     Ms. Choco Walsh is a 72year old female with PMHx of htn, DM, left cerebral diffuse glioma who presented to the ED for confusion. Spoke to son and he mentioned that his mom is fully independent and last spoke with his mom on Monday. Patient's friend was talking with her on the phone and noticed she was talking strange and was no speaking in complete sentences. Her friend had went to go check on the patient at her home and found the patient confused and unable to answer questions. Friend called 911 and was brought to the [place] ED. When patient arrived at the ED, she was hypoxic to the 70s and was put on non rebreather. Patient was started on 6LO2. This is a new O2 requirement and does not use any O2 at home. She was unable to answer any questions by the ED physicians. She tried following commands but had difficuty lifting her right arm and right leg. Patient was noted to have right facial drooping. Patient's son's ex wife mentioned that the patient stopped taking Keppra due to side effects. She was off of Keppra for at least 2 weeks. Patient has recent hospitalization 2023 for aphasia, initially thought to be CVA but but was not consistent with MR brain. She was thought to have possible seizures versus encephalitis. Plan was for patient to follow-up with neurology and neurosurgery outpatient to complete repeat MRI and possible biopsy if deemed necessary. LP obtained and was not consistent with meningitis. Vital signs include being hypertensive to 193/75, tachycardic to 104, tachypneic to 28 and nonfebrile. Patient was oxygenating well on 6 L nasal cannula. Urinalysis positive for small amounts of blood and protein.  CXR revealed similar vascula prominence in the lungs and atelectasis as compared to the pior studies dated

## 2023-12-22 ENCOUNTER — APPOINTMENT (OUTPATIENT)
Dept: CT IMAGING | Age: 73
DRG: 025 | End: 2023-12-22
Attending: STUDENT IN AN ORGANIZED HEALTH CARE EDUCATION/TRAINING PROGRAM
Payer: MEDICARE

## 2023-12-22 LAB
ABO + RH BLD: NORMAL
ANION GAP SERPL CALCULATED.3IONS-SCNC: 6 MMOL/L (ref 3–16)
BASOPHILS # BLD: 0 K/UL (ref 0–0.2)
BASOPHILS NFR BLD: 0.7 %
BLD GP AB SCN SERPL QL: NORMAL
BUN SERPL-MCNC: 23 MG/DL (ref 7–20)
CALCIUM SERPL-MCNC: 9.3 MG/DL (ref 8.3–10.6)
CHLORIDE SERPL-SCNC: 99 MMOL/L (ref 99–110)
CO2 SERPL-SCNC: 27 MMOL/L (ref 21–32)
CREAT SERPL-MCNC: 1.2 MG/DL (ref 0.6–1.2)
DEPRECATED RDW RBC AUTO: 15.2 % (ref 12.4–15.4)
EOSINOPHIL # BLD: 0.1 K/UL (ref 0–0.6)
EOSINOPHIL NFR BLD: 1.9 %
GFR SERPLBLD CREATININE-BSD FMLA CKD-EPI: 48 ML/MIN/{1.73_M2}
GLUCOSE BLD-MCNC: 193 MG/DL (ref 70–99)
GLUCOSE BLD-MCNC: 215 MG/DL (ref 70–99)
GLUCOSE BLD-MCNC: 227 MG/DL (ref 70–99)
GLUCOSE BLD-MCNC: 265 MG/DL (ref 70–99)
GLUCOSE BLD-MCNC: 311 MG/DL (ref 70–99)
GLUCOSE BLD-MCNC: 393 MG/DL (ref 70–99)
GLUCOSE SERPL-MCNC: 263 MG/DL (ref 70–99)
HCT VFR BLD AUTO: 32.3 % (ref 36–48)
HGB BLD-MCNC: 10.8 G/DL (ref 12–16)
LYMPHOCYTES # BLD: 0.4 K/UL (ref 1–5.1)
LYMPHOCYTES NFR BLD: 10.5 %
MCH RBC QN AUTO: 29.5 PG (ref 26–34)
MCHC RBC AUTO-ENTMCNC: 33.5 G/DL (ref 31–36)
MCV RBC AUTO: 88.1 FL (ref 80–100)
MONOCYTES # BLD: 0.9 K/UL (ref 0–1.3)
MONOCYTES NFR BLD: 23.3 %
NEUTROPHILS # BLD: 2.6 K/UL (ref 1.7–7.7)
NEUTROPHILS NFR BLD: 63.6 %
PERFORMED ON: ABNORMAL
PLATELET # BLD AUTO: 168 K/UL (ref 135–450)
PMV BLD AUTO: 9.1 FL (ref 5–10.5)
POTASSIUM SERPL-SCNC: 4.6 MMOL/L (ref 3.5–5.1)
RBC # BLD AUTO: 3.67 M/UL (ref 4–5.2)
SODIUM SERPL-SCNC: 132 MMOL/L (ref 136–145)
WBC # BLD AUTO: 4.1 K/UL (ref 4–11)

## 2023-12-22 PROCEDURE — 2580000003 HC RX 258

## 2023-12-22 PROCEDURE — 8E09XBF COMPUTER ASSISTED PROCEDURE OF HEAD AND NECK REGION, WITH FLUOROSCOPY: ICD-10-PCS | Performed by: NEUROLOGICAL SURGERY

## 2023-12-22 PROCEDURE — 6370000000 HC RX 637 (ALT 250 FOR IP): Performed by: NEUROLOGICAL SURGERY

## 2023-12-22 PROCEDURE — 7100000001 HC PACU RECOVERY - ADDTL 15 MIN: Performed by: NEUROLOGICAL SURGERY

## 2023-12-22 PROCEDURE — 3600000004 HC SURGERY LEVEL 4 BASE: Performed by: NEUROLOGICAL SURGERY

## 2023-12-22 PROCEDURE — 2500000003 HC RX 250 WO HCPCS: Performed by: NEUROLOGICAL SURGERY

## 2023-12-22 PROCEDURE — 2720000010 HC SURG SUPPLY STERILE: Performed by: NEUROLOGICAL SURGERY

## 2023-12-22 PROCEDURE — 7100000000 HC PACU RECOVERY - FIRST 15 MIN: Performed by: NEUROLOGICAL SURGERY

## 2023-12-22 PROCEDURE — 36415 COLL VENOUS BLD VENIPUNCTURE: CPT

## 2023-12-22 PROCEDURE — C1713 ANCHOR/SCREW BN/BN,TIS/BN: HCPCS | Performed by: NEUROLOGICAL SURGERY

## 2023-12-22 PROCEDURE — 2580000003 HC RX 258: Performed by: NEUROLOGICAL SURGERY

## 2023-12-22 PROCEDURE — APPNB30 APP NON BILLABLE TIME 0-30 MINS: Performed by: NURSE PRACTITIONER

## 2023-12-22 PROCEDURE — 00B70ZX EXCISION OF CEREBRAL HEMISPHERE, OPEN APPROACH, DIAGNOSTIC: ICD-10-PCS | Performed by: NEUROLOGICAL SURGERY

## 2023-12-22 PROCEDURE — 2709999900 HC NON-CHARGEABLE SUPPLY: Performed by: NEUROLOGICAL SURGERY

## 2023-12-22 PROCEDURE — A9576 INJ PROHANCE MULTIPACK: HCPCS | Performed by: NEUROLOGICAL SURGERY

## 2023-12-22 PROCEDURE — 3E0333Z INTRODUCTION OF ANTI-INFLAMMATORY INTO PERIPHERAL VEIN, PERCUTANEOUS APPROACH: ICD-10-PCS | Performed by: INTERNAL MEDICINE

## 2023-12-22 PROCEDURE — 86901 BLOOD TYPING SEROLOGIC RH(D): CPT

## 2023-12-22 PROCEDURE — 6370000000 HC RX 637 (ALT 250 FOR IP)

## 2023-12-22 PROCEDURE — 6360000004 HC RX CONTRAST MEDICATION: Performed by: NEUROLOGICAL SURGERY

## 2023-12-22 PROCEDURE — 6360000002 HC RX W HCPCS: Performed by: NURSE PRACTITIONER

## 2023-12-22 PROCEDURE — 6360000002 HC RX W HCPCS: Performed by: ANESTHESIOLOGY

## 2023-12-22 PROCEDURE — 1200000000 HC SEMI PRIVATE

## 2023-12-22 PROCEDURE — 99232 SBSQ HOSP IP/OBS MODERATE 35: CPT

## 2023-12-22 PROCEDURE — 85025 COMPLETE CBC W/AUTO DIFF WBC: CPT

## 2023-12-22 PROCEDURE — A4216 STERILE WATER/SALINE, 10 ML: HCPCS | Performed by: NEUROLOGICAL SURGERY

## 2023-12-22 PROCEDURE — 3600000014 HC SURGERY LEVEL 4 ADDTL 15MIN: Performed by: NEUROLOGICAL SURGERY

## 2023-12-22 PROCEDURE — 70450 CT HEAD/BRAIN W/O DYE: CPT

## 2023-12-22 PROCEDURE — 3700000000 HC ANESTHESIA ATTENDED CARE: Performed by: NEUROLOGICAL SURGERY

## 2023-12-22 PROCEDURE — 3700000001 HC ADD 15 MINUTES (ANESTHESIA): Performed by: NEUROLOGICAL SURGERY

## 2023-12-22 PROCEDURE — 80048 BASIC METABOLIC PNL TOTAL CA: CPT

## 2023-12-22 PROCEDURE — 88331 PATH CONSLTJ SURG 1 BLK 1SPC: CPT

## 2023-12-22 PROCEDURE — 86900 BLOOD TYPING SEROLOGIC ABO: CPT

## 2023-12-22 PROCEDURE — 86850 RBC ANTIBODY SCREEN: CPT

## 2023-12-22 DEVICE — LOW PROFILE BURR HOLE COVER, W/TAB, 14MM
Type: IMPLANTABLE DEVICE | Site: CRANIAL | Status: FUNCTIONAL
Brand: UNIVERSAL NEURO 2

## 2023-12-22 DEVICE — SCREW UN3 SLFTP 1.5X4MM: Type: IMPLANTABLE DEVICE | Site: CRANIAL | Status: FUNCTIONAL

## 2023-12-22 RX ORDER — CLINDAMYCIN PHOSPHATE 600 MG/50ML
600 INJECTION, SOLUTION INTRAVENOUS EVERY 8 HOURS
Status: DISPENSED | OUTPATIENT
Start: 2023-12-22 | End: 2023-12-23

## 2023-12-22 RX ORDER — TRAMADOL HYDROCHLORIDE 50 MG/1
50 TABLET ORAL EVERY 6 HOURS PRN
Status: DISCONTINUED | OUTPATIENT
Start: 2023-12-22 | End: 2023-12-24 | Stop reason: HOSPADM

## 2023-12-22 RX ORDER — MEPERIDINE HYDROCHLORIDE 25 MG/ML
12.5 INJECTION INTRAMUSCULAR; INTRAVENOUS; SUBCUTANEOUS EVERY 5 MIN PRN
Status: DISCONTINUED | OUTPATIENT
Start: 2023-12-22 | End: 2023-12-22 | Stop reason: HOSPADM

## 2023-12-22 RX ORDER — SODIUM CHLORIDE 9 MG/ML
10 INJECTION INTRAVENOUS 2 TIMES DAILY
Status: DISCONTINUED | OUTPATIENT
Start: 2023-12-22 | End: 2023-12-24 | Stop reason: HOSPADM

## 2023-12-22 RX ORDER — ONDANSETRON 2 MG/ML
4 INJECTION INTRAMUSCULAR; INTRAVENOUS
Status: DISCONTINUED | OUTPATIENT
Start: 2023-12-22 | End: 2023-12-22 | Stop reason: HOSPADM

## 2023-12-22 RX ORDER — SODIUM CHLORIDE 9 MG/ML
INJECTION, SOLUTION INTRAVENOUS PRN
Status: DISCONTINUED | OUTPATIENT
Start: 2023-12-22 | End: 2023-12-22 | Stop reason: HOSPADM

## 2023-12-22 RX ORDER — SODIUM CHLORIDE 0.9 % (FLUSH) 0.9 %
5-40 SYRINGE (ML) INJECTION EVERY 12 HOURS SCHEDULED
Status: DISCONTINUED | OUTPATIENT
Start: 2023-12-22 | End: 2023-12-22 | Stop reason: HOSPADM

## 2023-12-22 RX ORDER — PROCHLORPERAZINE EDISYLATE 5 MG/ML
5 INJECTION INTRAMUSCULAR; INTRAVENOUS
Status: DISCONTINUED | OUTPATIENT
Start: 2023-12-22 | End: 2023-12-22 | Stop reason: HOSPADM

## 2023-12-22 RX ORDER — ACETAMINOPHEN 325 MG/1
650 TABLET ORAL
Status: DISCONTINUED | OUTPATIENT
Start: 2023-12-22 | End: 2023-12-22 | Stop reason: HOSPADM

## 2023-12-22 RX ORDER — HYDROMORPHONE HYDROCHLORIDE 1 MG/ML
0.5 INJECTION, SOLUTION INTRAMUSCULAR; INTRAVENOUS; SUBCUTANEOUS EVERY 5 MIN PRN
Status: DISCONTINUED | OUTPATIENT
Start: 2023-12-22 | End: 2023-12-22 | Stop reason: HOSPADM

## 2023-12-22 RX ORDER — DIPHENHYDRAMINE HYDROCHLORIDE 50 MG/ML
12.5 INJECTION INTRAMUSCULAR; INTRAVENOUS
Status: DISCONTINUED | OUTPATIENT
Start: 2023-12-22 | End: 2023-12-22 | Stop reason: HOSPADM

## 2023-12-22 RX ORDER — LABETALOL HYDROCHLORIDE 5 MG/ML
10 INJECTION, SOLUTION INTRAVENOUS
Status: DISCONTINUED | OUTPATIENT
Start: 2023-12-22 | End: 2023-12-22 | Stop reason: HOSPADM

## 2023-12-22 RX ORDER — LORAZEPAM 2 MG/ML
0.5 INJECTION INTRAMUSCULAR
Status: DISCONTINUED | OUTPATIENT
Start: 2023-12-22 | End: 2023-12-22 | Stop reason: HOSPADM

## 2023-12-22 RX ORDER — BUPIVACAINE HYDROCHLORIDE AND EPINEPHRINE 5; 5 MG/ML; UG/ML
INJECTION, SOLUTION EPIDURAL; INTRACAUDAL; PERINEURAL PRN
Status: DISCONTINUED | OUTPATIENT
Start: 2023-12-22 | End: 2023-12-22 | Stop reason: HOSPADM

## 2023-12-22 RX ORDER — SODIUM CHLORIDE 0.9 % (FLUSH) 0.9 %
5-40 SYRINGE (ML) INJECTION PRN
Status: DISCONTINUED | OUTPATIENT
Start: 2023-12-22 | End: 2023-12-22 | Stop reason: HOSPADM

## 2023-12-22 RX ORDER — FENTANYL CITRATE 50 UG/ML
25 INJECTION, SOLUTION INTRAMUSCULAR; INTRAVENOUS EVERY 5 MIN PRN
Status: DISCONTINUED | OUTPATIENT
Start: 2023-12-22 | End: 2023-12-22 | Stop reason: HOSPADM

## 2023-12-22 RX ORDER — INSULIN GLARGINE 100 [IU]/ML
10 INJECTION, SOLUTION SUBCUTANEOUS NIGHTLY
Status: DISCONTINUED | OUTPATIENT
Start: 2023-12-22 | End: 2023-12-23

## 2023-12-22 RX ORDER — IPRATROPIUM BROMIDE AND ALBUTEROL SULFATE 2.5; .5 MG/3ML; MG/3ML
1 SOLUTION RESPIRATORY (INHALATION)
Status: DISCONTINUED | OUTPATIENT
Start: 2023-12-22 | End: 2023-12-22 | Stop reason: HOSPADM

## 2023-12-22 RX ORDER — TRAMADOL HYDROCHLORIDE 50 MG/1
100 TABLET ORAL EVERY 6 HOURS PRN
Status: DISCONTINUED | OUTPATIENT
Start: 2023-12-22 | End: 2023-12-24 | Stop reason: HOSPADM

## 2023-12-22 RX ADMIN — SODIUM CHLORIDE, PRESERVATIVE FREE 10 ML: 5 INJECTION INTRAVENOUS at 21:15

## 2023-12-22 RX ADMIN — LEVETIRACETAM 1000 MG: 500 INJECTION, SOLUTION, CONCENTRATE INTRAVENOUS at 13:31

## 2023-12-22 RX ADMIN — HYDROMORPHONE HYDROCHLORIDE 0.5 MG: 1 INJECTION, SOLUTION INTRAMUSCULAR; INTRAVENOUS; SUBCUTANEOUS at 17:30

## 2023-12-22 RX ADMIN — SODIUM CHLORIDE, PRESERVATIVE FREE 20 ML: 5 INJECTION INTRAVENOUS at 21:15

## 2023-12-22 RX ADMIN — INSULIN LISPRO 4 UNITS: 100 INJECTION, SOLUTION INTRAVENOUS; SUBCUTANEOUS at 21:15

## 2023-12-22 RX ADMIN — INSULIN LISPRO 2 UNITS: 100 INJECTION, SOLUTION INTRAVENOUS; SUBCUTANEOUS at 08:43

## 2023-12-22 RX ADMIN — SODIUM CHLORIDE, PRESERVATIVE FREE 10 ML: 5 INJECTION INTRAVENOUS at 08:39

## 2023-12-22 RX ADMIN — INSULIN GLARGINE 10 UNITS: 100 INJECTION, SOLUTION SUBCUTANEOUS at 21:15

## 2023-12-22 RX ADMIN — INSULIN LISPRO 3 UNITS: 100 INJECTION, SOLUTION INTRAVENOUS; SUBCUTANEOUS at 18:12

## 2023-12-22 RX ADMIN — TRAMADOL HYDROCHLORIDE 100 MG: 50 TABLET ORAL at 18:56

## 2023-12-22 RX ADMIN — SODIUM CHLORIDE: 9 INJECTION, SOLUTION INTRAVENOUS at 18:16

## 2023-12-22 RX ADMIN — LEVETIRACETAM 1000 MG: 500 INJECTION, SOLUTION, CONCENTRATE INTRAVENOUS at 01:31

## 2023-12-22 RX ADMIN — SODIUM CHLORIDE, PRESERVATIVE FREE 10 ML: 5 INJECTION INTRAVENOUS at 00:14

## 2023-12-22 RX ADMIN — SODIUM CHLORIDE, PRESERVATIVE FREE 10 ML: 5 INJECTION INTRAVENOUS at 01:33

## 2023-12-22 RX ADMIN — GADOTERIDOL 20 ML: 279.3 INJECTION, SOLUTION INTRAVENOUS at 00:14

## 2023-12-22 NOTE — OP NOTE
OPERATIVE NOTE       Patient Name: Sharri Ahumada  MRN: [id number]  : 1950  DOS: 2023     PREOPERATIVE DIAGNOSIS: Left temporal mass     POSTOPERATIVE DIAGNOSIS: Left temporal mass     PROCEDURE PERFORMED:    1. Left temporal bur hole for open biopsy of mass  2. Use of Intraoperative neuronavigation      SURGEON: Charmayne Post, MD, PhD     ASSISTANT:   ANESTHESIA: General endotracheal anesthesia. ESTIMATED BLOOD LOSS: 25 cc    DRAINS: None    IMPLANTS:   1. Branden cranial plating system     SPECIMEN:   1. Left temporal mass for frozen and permanent histopathology     INDICATIONS:     Ms. Marilee Donaldson is a 72year old woman who presented with likely seizures. She has had a known FLAIR signal abnormality in the left temporal lobe dating back to September. Her most recent imaging demonstrates slight progression in the lesion. Given the appearance of the lesion and the absence of a diagnosis, recommendation was made to proceed with a stereotactic open biopsy to establish a definitive diagnosis. The patient understands the risks and benefits of the procedure including but not limited to bleeding, infection, errant or insufficient biopsy, stroke, anesthesia risks, and death. OPERATIVE PROCEDURE:      Under universal protocol and informed consent, the patient was brought to the operating room. General anesthesia was induced and the patient was intubated. The patient was placed in the supine position and all pressure points were appropriately padded. The patient was then placed in [address] [place] [address] fixation with the head turned to the right. Brainlab image guidance was registered and verified. A small incision was planned above the ear along the typical course of a  reverse question jitendra incision in case one was necessary in the future. The incision was planned with stereotaxy overlying the region where the left temporal tumor was most easily accessible.  Intravenous antibiotics (Ancef) and Keppra were given by

## 2023-12-22 NOTE — DISCHARGE SUMMARY
INTERNAL MEDICINE DEPARTMENT AT [address] [place] [address]  DISCHARGE SUMMARY    Patient ID: Tate Baez                                             Discharge Date: 12/24/2023   Patient's PCP: Heydi Underwood APRN - CNP                                          Discharge Physician: Zena Salcido MD MD  Admit Date: 12/21/2023   Admitting Physician: Valentina Michel MD    PROBLEMS DURING HOSPITALIZATION:  Present on Admission:   Right-sided visual neglect   AMS (altered mental status)   Brain mass      DISCHARGE DIAGNOSES: Altered mentation secondary to hypercarbia versus seizure  COVID  Brain mass    HPI: Ms. Yoav Carvajal is a 72year old female with PMHx of htn, DM, left cerebral diffuse glioma who presented to the ED for confusion. Patient's friend was talking with her on the phone and noticed she was talking strange and was no speaking in complete sentences. Her friend had went to go check on the patient at her home and found the patient confused and unable to answer questions. Friend called 911 and was brought to the [place] ED. When patient arrived at the ED, she was hypoxic to the 70s and was put on non rebreather. Patient was started on 6LO2 from baseline of no O2 requirement. She was unable to answer any questions by the ED physicians. She tried following commands but had difficuty lifting her right arm and right leg. Patient was noted to have right facial drooping. Patient was previously prescribed Keppra and has not taken for at least 2 weeks per family. Patient had recent hospitalization 9/29/2023 for aphasia, initially thought to be CVA but but was not consistent with MR brain. She was thought to have possible seizures versus encephalitis. Plan was for patient to follow-up with neurology and neurosurgery outpatient to complete repeat MRI and possible biopsy if deemed necessary. LP obtained and was not consistent with meningitis.      Vital signs include being hypertensive to

## 2023-12-22 NOTE — BRIEF OP NOTE
Brief Postoperative Note      Patient: Ryan Baez  YOB: 1950  MRN: [id number]    Date of Procedure: 12/22/2023    Pre-Op Diagnosis Codes:     * Brain tumor consultation [Z71.89]    Post-Op Diagnosis: Same       Procedure(s):  LEFT TEMPORAL BRAIN BIOPSY    Surgeon(s):  Chucky Jules MD    Assistant:  Surgical Assistant: Shweta Owens    Anesthesia: General    Estimated Blood Loss (mL): Minimal    Complications: None    Specimens:   ID Type Source Tests Collected by Time Destination   A : A) left temporal mass Tissue Brain SURGICAL PATHOLOGY Chucky Jules MD 12/22/2023 1601    B : B) LEFT TEMPORAL MASS Tissue Brain SURGICAL PATHOLOGY Chucky Jules MD 12/22/2023 1634        Implants:  * No implants in log *      Drains: * No LDAs found *    Findings: somewhat gliotic material in left temporal lobe       Electronically signed by Chucky Jules MD on 12/22/2023 at 4:37 PM

## 2023-12-23 LAB
ANION GAP SERPL CALCULATED.3IONS-SCNC: 15 MMOL/L (ref 3–16)
BASOPHILS # BLD: 0 K/UL (ref 0–0.2)
BASOPHILS NFR BLD: 0.5 %
BUN SERPL-MCNC: 27 MG/DL (ref 7–20)
CALCIUM SERPL-MCNC: 8.9 MG/DL (ref 8.3–10.6)
CHLORIDE SERPL-SCNC: 97 MMOL/L (ref 99–110)
CO2 SERPL-SCNC: 22 MMOL/L (ref 21–32)
CREAT SERPL-MCNC: 1.3 MG/DL (ref 0.6–1.2)
DEPRECATED RDW RBC AUTO: 15.1 % (ref 12.4–15.4)
EOSINOPHIL # BLD: 0 K/UL (ref 0–0.6)
EOSINOPHIL NFR BLD: 0.1 %
GFR SERPLBLD CREATININE-BSD FMLA CKD-EPI: 44 ML/MIN/{1.73_M2}
GLUCOSE BLD-MCNC: 338 MG/DL (ref 70–99)
GLUCOSE BLD-MCNC: 359 MG/DL (ref 70–99)
GLUCOSE BLD-MCNC: 385 MG/DL (ref 70–99)
GLUCOSE BLD-MCNC: 408 MG/DL (ref 70–99)
GLUCOSE SERPL-MCNC: 370 MG/DL (ref 70–99)
GLUCOSE SERPL-MCNC: 425 MG/DL (ref 70–99)
HCT VFR BLD AUTO: 33.5 % (ref 36–48)
HGB BLD-MCNC: 10.9 G/DL (ref 12–16)
LYMPHOCYTES # BLD: 0.4 K/UL (ref 1–5.1)
LYMPHOCYTES NFR BLD: 9.7 %
MCH RBC QN AUTO: 29 PG (ref 26–34)
MCHC RBC AUTO-ENTMCNC: 32.6 G/DL (ref 31–36)
MCV RBC AUTO: 88.9 FL (ref 80–100)
MONOCYTES # BLD: 0.4 K/UL (ref 0–1.3)
MONOCYTES NFR BLD: 9.5 %
NEUTROPHILS # BLD: 3.7 K/UL (ref 1.7–7.7)
NEUTROPHILS NFR BLD: 80.2 %
PERFORMED ON: ABNORMAL
PLATELET # BLD AUTO: 206 K/UL (ref 135–450)
PMV BLD AUTO: 9 FL (ref 5–10.5)
POTASSIUM SERPL-SCNC: 4.6 MMOL/L (ref 3.5–5.1)
RBC # BLD AUTO: 3.77 M/UL (ref 4–5.2)
SODIUM SERPL-SCNC: 134 MMOL/L (ref 136–145)
WBC # BLD AUTO: 4.6 K/UL (ref 4–11)

## 2023-12-23 PROCEDURE — 1200000000 HC SEMI PRIVATE

## 2023-12-23 PROCEDURE — 2580000003 HC RX 258: Performed by: NEUROLOGICAL SURGERY

## 2023-12-23 PROCEDURE — 80048 BASIC METABOLIC PNL TOTAL CA: CPT

## 2023-12-23 PROCEDURE — 6360000002 HC RX W HCPCS: Performed by: NEUROLOGICAL SURGERY

## 2023-12-23 PROCEDURE — 99232 SBSQ HOSP IP/OBS MODERATE 35: CPT

## 2023-12-23 PROCEDURE — 6370000000 HC RX 637 (ALT 250 FOR IP)

## 2023-12-23 PROCEDURE — 6370000000 HC RX 637 (ALT 250 FOR IP): Performed by: NEUROLOGICAL SURGERY

## 2023-12-23 PROCEDURE — 85025 COMPLETE CBC W/AUTO DIFF WBC: CPT

## 2023-12-23 PROCEDURE — 94761 N-INVAS EAR/PLS OXIMETRY MLT: CPT

## 2023-12-23 PROCEDURE — 82947 ASSAY GLUCOSE BLOOD QUANT: CPT

## 2023-12-23 PROCEDURE — 2700000000 HC OXYGEN THERAPY PER DAY

## 2023-12-23 PROCEDURE — 36415 COLL VENOUS BLD VENIPUNCTURE: CPT

## 2023-12-23 PROCEDURE — 6370000000 HC RX 637 (ALT 250 FOR IP): Performed by: STUDENT IN AN ORGANIZED HEALTH CARE EDUCATION/TRAINING PROGRAM

## 2023-12-23 RX ORDER — INSULIN LISPRO 100 [IU]/ML
0-4 INJECTION, SOLUTION INTRAVENOUS; SUBCUTANEOUS NIGHTLY
Status: DISCONTINUED | OUTPATIENT
Start: 2023-12-23 | End: 2023-12-23

## 2023-12-23 RX ORDER — CLINDAMYCIN PHOSPHATE 600 MG/50ML
600 INJECTION, SOLUTION INTRAVENOUS ONCE
Status: COMPLETED | OUTPATIENT
Start: 2023-12-23 | End: 2023-12-23

## 2023-12-23 RX ORDER — INSULIN LISPRO 100 [IU]/ML
0-16 INJECTION, SOLUTION INTRAVENOUS; SUBCUTANEOUS
Status: DISCONTINUED | OUTPATIENT
Start: 2023-12-23 | End: 2023-12-24

## 2023-12-23 RX ORDER — INSULIN LISPRO 100 [IU]/ML
4 INJECTION, SOLUTION INTRAVENOUS; SUBCUTANEOUS
Status: DISCONTINUED | OUTPATIENT
Start: 2023-12-23 | End: 2023-12-23

## 2023-12-23 RX ORDER — INSULIN GLARGINE 100 [IU]/ML
16 INJECTION, SOLUTION SUBCUTANEOUS NIGHTLY
Status: DISCONTINUED | OUTPATIENT
Start: 2023-12-23 | End: 2023-12-24

## 2023-12-23 RX ORDER — INSULIN LISPRO 100 [IU]/ML
8 INJECTION, SOLUTION INTRAVENOUS; SUBCUTANEOUS
Status: DISCONTINUED | OUTPATIENT
Start: 2023-12-24 | End: 2023-12-24

## 2023-12-23 RX ADMIN — INSULIN GLARGINE 16 UNITS: 100 INJECTION, SOLUTION SUBCUTANEOUS at 20:36

## 2023-12-23 RX ADMIN — ALLOPURINOL 300 MG: 300 TABLET ORAL at 09:06

## 2023-12-23 RX ADMIN — SODIUM CHLORIDE, PRESERVATIVE FREE 10 ML: 5 INJECTION INTRAVENOUS at 09:06

## 2023-12-23 RX ADMIN — INSULIN HUMAN 5 UNITS: 100 INJECTION, SOLUTION PARENTERAL at 17:50

## 2023-12-23 RX ADMIN — LEVETIRACETAM 1000 MG: 500 INJECTION, SOLUTION, CONCENTRATE INTRAVENOUS at 02:07

## 2023-12-23 RX ADMIN — INSULIN LISPRO 3 UNITS: 100 INJECTION, SOLUTION INTRAVENOUS; SUBCUTANEOUS at 13:02

## 2023-12-23 RX ADMIN — TRIAMTERENE AND HYDROCHLOROTHIAZIDE 1 TABLET: 37.5; 25 TABLET ORAL at 09:06

## 2023-12-23 RX ADMIN — SODIUM CHLORIDE: 9 INJECTION, SOLUTION INTRAVENOUS at 09:04

## 2023-12-23 RX ADMIN — INSULIN LISPRO 16 UNITS: 100 INJECTION, SOLUTION INTRAVENOUS; SUBCUTANEOUS at 17:50

## 2023-12-23 RX ADMIN — CLINDAMYCIN PHOSPHATE 600 MG: 600 INJECTION, SOLUTION INTRAVENOUS at 17:38

## 2023-12-23 RX ADMIN — CLINDAMYCIN PHOSPHATE 600 MG: 600 INJECTION, SOLUTION INTRAVENOUS at 00:11

## 2023-12-23 RX ADMIN — INSULIN LISPRO 4 UNITS: 100 INJECTION, SOLUTION INTRAVENOUS; SUBCUTANEOUS at 09:01

## 2023-12-23 RX ADMIN — INSULIN LISPRO 4 UNITS: 100 INJECTION, SOLUTION INTRAVENOUS; SUBCUTANEOUS at 09:02

## 2023-12-23 RX ADMIN — AMLODIPINE BESYLATE 5 MG: 5 TABLET ORAL at 09:06

## 2023-12-23 RX ADMIN — CLINDAMYCIN PHOSPHATE 600 MG: 600 INJECTION, SOLUTION INTRAVENOUS at 09:28

## 2023-12-23 RX ADMIN — LEVETIRACETAM 1000 MG: 500 INJECTION, SOLUTION, CONCENTRATE INTRAVENOUS at 13:02

## 2023-12-23 RX ADMIN — INSULIN LISPRO 4 UNITS: 100 INJECTION, SOLUTION INTRAVENOUS; SUBCUTANEOUS at 13:01

## 2023-12-23 NOTE — DISCHARGE INSTRUCTIONS
Seizure Driving Risk: Having a Seizure while driving puts you at risk for injuring yourself or others. If you drive while having uncontrolled seizures, you may be held liable for injuries to others. Do not drive until you have been seizure free for at least 3 months, state laws vary so please check laws in your state. Injury Risk: Please avoid working from [employer], swimming alone or taking baths in a bathtub, use showers only. Your medication has been changed. Please start taking Keppra 1000 mg 2 times daily instead of 500 mg. Please follow-up with your primary care physician within 1 week. Please come to the ED immediately if you have any confusion, chest pain, belly pain or any other sign or symptom that needs immediate attention    [facility] Brain Tumor Survey  The [address] [place] [address] Dr value your feedback related to your recent hospital admission and surgery. The anonymous survey below consists of a few questions that are related to your stay and around your surgery, treatment, and recovery while in the hospital. It is anonymous and has only a few questions. The estimated length of time needed to complete this survey is 3 minutes or less. Thank you for completing this survey!

## 2023-12-24 VITALS
HEIGHT: 65 IN | RESPIRATION RATE: 20 BRPM | SYSTOLIC BLOOD PRESSURE: 150 MMHG | OXYGEN SATURATION: 96 % | WEIGHT: 293 LBS | HEART RATE: 75 BPM | BODY MASS INDEX: 48.82 KG/M2 | DIASTOLIC BLOOD PRESSURE: 65 MMHG | TEMPERATURE: 97.9 F

## 2023-12-24 LAB
ANION GAP SERPL CALCULATED.3IONS-SCNC: 5 MMOL/L (ref 3–16)
BASOPHILS # BLD: 0 K/UL (ref 0–0.2)
BASOPHILS NFR BLD: 0.6 %
BUN SERPL-MCNC: 21 MG/DL (ref 7–20)
CALCIUM SERPL-MCNC: 8.9 MG/DL (ref 8.3–10.6)
CHLORIDE SERPL-SCNC: 101 MMOL/L (ref 99–110)
CO2 SERPL-SCNC: 30 MMOL/L (ref 21–32)
CREAT SERPL-MCNC: 1.1 MG/DL (ref 0.6–1.2)
DEPRECATED RDW RBC AUTO: 15.2 % (ref 12.4–15.4)
EOSINOPHIL # BLD: 0.1 K/UL (ref 0–0.6)
EOSINOPHIL NFR BLD: 2.6 %
GFR SERPLBLD CREATININE-BSD FMLA CKD-EPI: 54 ML/MIN/{1.73_M2}
GLUCOSE BLD-MCNC: 157 MG/DL (ref 70–99)
GLUCOSE BLD-MCNC: 222 MG/DL (ref 70–99)
GLUCOSE SERPL-MCNC: 206 MG/DL (ref 70–99)
HCT VFR BLD AUTO: 32.4 % (ref 36–48)
HGB BLD-MCNC: 10.8 G/DL (ref 12–16)
LYMPHOCYTES # BLD: 1.1 K/UL (ref 1–5.1)
LYMPHOCYTES NFR BLD: 23 %
MCH RBC QN AUTO: 29.5 PG (ref 26–34)
MCHC RBC AUTO-ENTMCNC: 33.5 G/DL (ref 31–36)
MCV RBC AUTO: 88.1 FL (ref 80–100)
MONOCYTES # BLD: 0.6 K/UL (ref 0–1.3)
MONOCYTES NFR BLD: 13.7 %
NEUTROPHILS # BLD: 2.8 K/UL (ref 1.7–7.7)
NEUTROPHILS NFR BLD: 60.1 %
PERFORMED ON: ABNORMAL
PERFORMED ON: ABNORMAL
PLATELET # BLD AUTO: 207 K/UL (ref 135–450)
PMV BLD AUTO: 8.9 FL (ref 5–10.5)
POTASSIUM SERPL-SCNC: 3.9 MMOL/L (ref 3.5–5.1)
RBC # BLD AUTO: 3.68 M/UL (ref 4–5.2)
SODIUM SERPL-SCNC: 136 MMOL/L (ref 136–145)
WBC # BLD AUTO: 4.7 K/UL (ref 4–11)

## 2023-12-24 PROCEDURE — 2580000003 HC RX 258: Performed by: NEUROLOGICAL SURGERY

## 2023-12-24 PROCEDURE — 6370000000 HC RX 637 (ALT 250 FOR IP): Performed by: NEUROLOGICAL SURGERY

## 2023-12-24 PROCEDURE — 36415 COLL VENOUS BLD VENIPUNCTURE: CPT

## 2023-12-24 PROCEDURE — 85025 COMPLETE CBC W/AUTO DIFF WBC: CPT

## 2023-12-24 PROCEDURE — 95717 EEG PHYS/QHP 2-12 HR W/O VID: CPT | Performed by: PSYCHIATRY & NEUROLOGY

## 2023-12-24 PROCEDURE — 80048 BASIC METABOLIC PNL TOTAL CA: CPT

## 2023-12-24 PROCEDURE — 6370000000 HC RX 637 (ALT 250 FOR IP)

## 2023-12-24 PROCEDURE — 6360000002 HC RX W HCPCS: Performed by: NEUROLOGICAL SURGERY

## 2023-12-24 RX ORDER — INSULIN LISPRO 100 [IU]/ML
15 INJECTION, SOLUTION INTRAVENOUS; SUBCUTANEOUS
Status: DISCONTINUED | OUTPATIENT
Start: 2023-12-24 | End: 2023-12-24 | Stop reason: HOSPADM

## 2023-12-24 RX ORDER — INSULIN LISPRO 100 [IU]/ML
15 INJECTION, SOLUTION INTRAVENOUS; SUBCUTANEOUS ONCE
Status: COMPLETED | OUTPATIENT
Start: 2023-12-24 | End: 2023-12-24

## 2023-12-24 RX ORDER — INSULIN GLARGINE 100 [IU]/ML
15 INJECTION, SOLUTION SUBCUTANEOUS NIGHTLY
Status: DISCONTINUED | OUTPATIENT
Start: 2023-12-24 | End: 2023-12-24 | Stop reason: HOSPADM

## 2023-12-24 RX ORDER — LEVETIRACETAM 500 MG/1
1000 TABLET ORAL 2 TIMES DAILY
Qty: 60 TABLET | Refills: 3 | Status: SHIPPED | OUTPATIENT
Start: 2023-12-24

## 2023-12-24 RX ORDER — LEVETIRACETAM 500 MG/5ML
1000 INJECTION, SOLUTION, CONCENTRATE INTRAVENOUS EVERY 12 HOURS
Qty: 60 EACH | Refills: 2 | Status: SHIPPED | OUTPATIENT
Start: 2023-12-24 | End: 2023-12-24 | Stop reason: HOSPADM

## 2023-12-24 RX ORDER — CALCIUM CARBONATE 500 MG/1
500 TABLET, CHEWABLE ORAL 3 TIMES DAILY PRN
Status: DISCONTINUED | OUTPATIENT
Start: 2023-12-24 | End: 2023-12-24 | Stop reason: HOSPADM

## 2023-12-24 RX ADMIN — SODIUM CHLORIDE, PRESERVATIVE FREE 10 ML: 5 INJECTION INTRAVENOUS at 08:22

## 2023-12-24 RX ADMIN — SODIUM CHLORIDE: 9 INJECTION, SOLUTION INTRAVENOUS at 03:10

## 2023-12-24 RX ADMIN — INSULIN LISPRO 15 UNITS: 100 INJECTION, SOLUTION INTRAVENOUS; SUBCUTANEOUS at 08:40

## 2023-12-24 RX ADMIN — TRIAMTERENE AND HYDROCHLOROTHIAZIDE 1 TABLET: 37.5; 25 TABLET ORAL at 08:22

## 2023-12-24 RX ADMIN — LEVETIRACETAM 1000 MG: 500 INJECTION, SOLUTION, CONCENTRATE INTRAVENOUS at 00:35

## 2023-12-24 RX ADMIN — LEVETIRACETAM 1000 MG: 500 INJECTION, SOLUTION, CONCENTRATE INTRAVENOUS at 14:06

## 2023-12-24 RX ADMIN — ALLOPURINOL 300 MG: 300 TABLET ORAL at 08:21

## 2023-12-24 RX ADMIN — ACETAMINOPHEN 650 MG: 325 TABLET ORAL at 00:34

## 2023-12-24 RX ADMIN — AMLODIPINE BESYLATE 5 MG: 5 TABLET ORAL at 08:21

## 2023-12-24 NOTE — PLAN OF CARE
Neurology Plan of Care:    Patient seen, getting ready to discharge. She remains hopeful and is anxious for a plan moving forward. She repeated without prompting to me today that she would not drive for three months, and understands she needs to have a doctors clearance.      Follow up in two weeks with Dr. Sotero Puckett 1000 mg BID  Await biopsy results  Discharging today, plan discussed with patient and son at [facility], [address] [place] [address]  Neurology & Neurocritical Care   Neurology Line: [phone]  [facility]: [facility] NPs
Patient seen overnight by my colleague Ghazal CAMACHO. In brief, this is a 72year old female with a history of a known, extensive Left temporal glioma who presented worsening aphasia and right hemiparesis that has now improved. I am familiar with this patient and upon my exam this morning, her aphasia seems consistent (but improved) when compared to how it was when she was admitted on 9/22. It is suspected that she had a seizure at that time which is also the case now. Her aphasia almost nearly resolved after a day or two during her prior admission. Currently, she continues to have expressive aphasia that is fluent at times and other times not fluent. Otherwise no right hemiparesis is noted or any other focal deficit. She is also COVID +. I opted to discontinue the cvEEG order as her aphasia seemed stable/improved this morning. I also discontinued the repeat MRI as this would not  and she just had one less than a month ago. She is honest in that she tells me she is not taking her Keppra because she does not think she needs it. She does not think she is having seizures. She seems to be in denial about her current diagnosis and does not want to talk about it. She think her aphasia is due to her \"getting old\". I re-educated her on her diagnosis and that she has a significant risk factors for seizures (left hemisphere glioma, temporal). I educated he that even though her seizures are focal in nature, then can generalize and she can lose conciseness. Because of this, I discussed with her that she cannot be driving due to her suspected seizures and that this puts her self and others at risk of injury or even death if she were to be behind the wheel of a car and seized. I also told her she should not bathe in water or swim, take care of small children alone or put herself in any situation (such as being at heights) where if she were to have a seizure that she could seriously injure herself.
Problem: Discharge Planning  Goal: Discharge to home or other facility with appropriate resources  12/24/2023 0055 by Gabriel Perez RN  Outcome: Progressing   Patient to be discharged when medically stable. Problem: Safety - Adult  Goal: Free from fall injury  12/24/2023 0055 by Gabriel Perez RN  Outcome: Progressing   Fall precautions in place. Bed alarm activated, low position, wheels locked. Up with stand-by assist.  Call light and belongings within reach. Patient calls for assistance appropriately. Continue to monitor safety. Problem: Confusion  Goal: Confusion, delirium, dementia, or psychosis is improved or at baseline  Description: INTERVENTIONS:  1. Assess for possible contributors to thought disturbance, including medications, impaired vision or hearing, underlying metabolic abnormalities, dehydration, psychiatric diagnoses, and notify attending LIP  2. [place] high risk fall precautions, as indicated  3. Provide frequent short contacts to provide reality reorientation, refocusing and direction  4. Decrease environmental stimuli, including noise as appropriate  5. Monitor and intervene to maintain adequate nutrition, hydration, elimination, sleep and activity  6. If unable to ensure safety without constant attention obtain sitter and review sitter guidelines with assigned personnel  7. Initiate Psychosocial CNS and Spiritual Care consult, as indicated  12/24/2023 0055 by Gabriel Perez RN  Outcome: Progressing   Patient alert and oriented x 4. Neuro checks complete and charted. Problem: Skin/Tissue Integrity  Goal: Absence of new skin breakdown  Description: 1. Monitor for areas of redness and/or skin breakdown  2. Assess vascular access sites hourly  3. Every 4-6 hours minimum:  Change oxygen saturation probe site  4.   Every 4-6 hours:  If on nasal continuous positive airway pressure, respiratory therapy assess nares and determine need for appliance change or resting
Problem: Pain  Goal: Verbalizes/displays adequate comfort level or baseline comfort level  12/24/2023 1055 by Karan Colbert RN  Outcome: Progressing     Problem: Neurosensory - Adult  Goal: Achieves stable or improved neurological status  12/24/2023 1055 by Karan Colbert RN  Outcome: Progressing     Problem: Confusion  Goal: Confusion, delirium, dementia, or psychosis is improved or at baseline  12/24/2023 1055 by Karan Colbert RN  Outcome: Progressing     Problem: Safety - Adult  Goal: Free from fall injury  12/24/2023 1055 by Karan Colbert RN  Outcome: Progressing
Problem: Pain  Goal: Verbalizes/displays adequate comfort level or baseline comfort level  Outcome: Progressing     Problem: Safety - Adult  Goal: Free from fall injury  Outcome: Progressing     Problem: Chronic Conditions and Co-morbidities  Goal: Patient's chronic conditions and co-morbidity symptoms are monitored and maintained or improved  Outcome: Progressing
Problem: Safety - Adult  Goal: Free from fall injury  12/21/2023 1539 by Leonel Claros RN  Patient free from fall injury at this time. Problem: Skin/Tissue Integrity  Goal: Absence of new skin breakdown  Description: 1. Monitor for areas of redness and/or skin breakdown  2. Assess vascular access sites hourly  3. Every 4-6 hours minimum:  Change oxygen saturation probe site  4. Every 4-6 hours:  If on nasal continuous positive airway pressure, respiratory therapy assess nares and determine need for appliance change or resting period. 12/21/2023 1539 by Leonel Claros RN  Outcome: Progressing  Patient able to turn self. Problem: Confusion  Goal: Confusion, delirium, dementia, or psychosis is improved or at baseline  Description: INTERVENTIONS:  1. Assess for possible contributors to thought disturbance, including medications, impaired vision or hearing, underlying metabolic abnormalities, dehydration, psychiatric diagnoses, and notify attending LIP  2. [place] high risk fall precautions, as indicated  3. Provide frequent short contacts to provide reality reorientation, refocusing and direction  4. Decrease environmental stimuli, including noise as appropriate  5. Monitor and intervene to maintain adequate nutrition, hydration, elimination, sleep and activity  6. If unable to ensure safety without constant attention obtain sitter and review sitter guidelines with assigned personnel  7. Initiate Psychosocial CNS and Spiritual Care consult, as indicated  12/21/2023 1539 by Leonel Claros RN  Patient alert and oriented X4 majority of the shift when awake.
adequate functional status:   Assess for contributors to thought disturbance, including medications, impaired vision or hearing, underlying metabolic abnormalities, dehydration, psychiatric diagnoses, notify [address] high risk fall precautions, as indicated   Provide frequent short contacts to provide reality reorientation, refocusing and direction  Note: Patient is waxes and wanes with disorientation and being oriented. Problem: Skin/Tissue Integrity  Goal: Absence of new skin breakdown  Description: 1. Monitor for areas of redness and/or skin breakdown  2. Assess vascular access sites hourly  3. Every 4-6 hours minimum:  Change oxygen saturation probe site  4. Every 4-6 hours:  If on nasal continuous positive airway pressure, respiratory therapy assess nares and determine need for appliance change or resting period.   Note: Patient is able to turn self
maximal functionality and self care  Outcome: Progressing

## 2023-12-24 NOTE — CONSULTS
NEUROSURGERY CONSULT NOTE    Tae OROZCO  [id number]   1950 12/22/2023    Requesting physician: Luis Chen MD    Reason for consultation:brain mass    History of present illness: Patient is a 72 y.o. female w/ PMH hypertension, diabetes, morbid obesity and a left temporal brain mass who presents with aphasia and right hemiparesis via ambulance from home after her friend went to check on her and found her confused and not responding to questions. Patient lives alone. Friend called 911. Paramedics found patient hypoxic in the 70s on arrival.     ROS:   GENERAL: +recent illness.  Denies weight changes   EYES:  Denies vision change or diplopia  EARS:  Denies hearing loss  CARDIAC:  Denies chest pain  RESPIRATORY:  Denies shortness of breath  SKIN:  Denies rash or lesions   HEM:  Denies excessive bruising  PSYCH:  Denies anxiety or depression  NEURO:  Denies headache, numbness or tingling or lateralizing weakness   :  Denies urinary difficulty  GI: Denies nausea, vomiting, diarrhea or constipation  MUSCULOSKELETAL:  No arthralgias    Allergies   Allergen Reactions    Cephalexin Itching     Can take Amoxil    Cephalexin Itching    Lipitor [Atorvastatin] Myalgia    Losartan Other (See Comments)     lightheaded       Past Medical History:   Diagnosis Date    Cataract     Diabetic retinopathy ([address])     High blood pressure     Proliferative diabetic retinopathy, right eye [facility])         Past Surgical History:   Procedure Laterality Date    CARPAL TUNNEL RELEASE  1999,2000    Bilateral    CATARACT REMOVAL WITH IMPLANT      MARY EYES    COLONOSCOPY  02/2022    Dr. Campos Back    COLONOSCOPY N/A 02/28/2022    COLONOSCOPY performed by Charis Smiley MD at [address] Left 03/08/2018    left ring finger    HAND SURGERY  2002, 2008    Left x5    HYSTERECTOMY (CERVIX STATUS UNKNOWN) Right 1998    x1 ovary
Neurology Consult Note    Patient: Ovi Perry MRN: [id number]    YOB: 1950  Age: 72 y.o. Sex: female   Unit: [address] 4 PCU  Room/Bed: 5029/4021-47 Location: [address] [place] [address]    Date of Consultation: 12/21/2023  Date of Admission: 12/21/2023 12:00 AM ( LOS: 0 days )  Primary Care Physician: JANICE Bryan - CNP   Consult Requested By: Maritza Valdez, *    Reason for Consult: AMS, EEG, brain mass    IMPRESSION & RECOMMENDATIONS     IMPRESSION:  Ramón Mancini is a 71 y/o woman with large left temporal brain mass who presents with aphasia and right hemiparesis which is improving. I suspect she had an unwitnessed seizure at home based on her marked improvement. She remains mildly aphasic (receptive more so than expressive) but given the degree of involvement of her lesion on MRI, this isn't unexpected. RECOMMENDATIONS:  - RAPID EEG x 2 hours (ordered). Will arrange cEEG in AM if aphasia persists. - Increase LEV to 1g BID (ordered)  - Neurosurgery consult has been placed, Dr. Gracy Baker aware of patient   - MRI brain w/wo fang to monitor progression, last checked 1 month ago  - Q4h neuro checks  - Seizure precautions  - Will need to discuss driving precautions when she is more capable of expressing understanding. JANICE Jones CNP   Neurology  12/21/2023 1:31 AM  [facility]: [facility] Neurology    History of Present Illness     Ovi Perry is a 72 y.o. y/o female with history significant for brain mass who presented to [facility] ER after being found aphasic, confused, and hypoxic (SpO2 in 70s) at home by a friend. Upon arrival to the ER she was unable to speak fluently, attempted to follow commands, and had right hemiparesis. BP was 193/75 mmHg. This gradually improved but she is still mildly aphasic. Her right hemiparesis has resolved less than 12 hours later. She was admitted in September of this year under similar circumstances.  CSF at the
[facility]    Consult Note      Requesting Physician: Dr. Bean Gonzalez:  Low grade glioma      HISTORY OF PRESENT ILLNESS:    Ms. Prosper Colvin  is a 72 y.o. female we are seeing in consultation for     ICD-10-CM    1. Brain tumor consultation  Z71.89 Surgical Pathology     Surgical Pathology     Surgical Pathology     Surgical Pathology         This patient was admitted to [facility] on 12/21/2023 after AMS and aphasia. She has a known T2 signal abnormality in the left temporal lobe, left occipital lobe, left thalamus without enhancement. A MRI on the day of admission redemonstrated this, but the area in the left thalamus appeared somewhat enlarged. She was to be on Keppra but has not been taking it. She underwent an open biopsy on 12/22/2023 with Dr. Sharla Orr.  Pathology is pending. Rad onc has already been consulted.     Past Medical History:  Past Medical History:   Diagnosis Date    Cataract     Diabetic retinopathy ([address])     High blood pressure     Proliferative diabetic retinopathy, right eye [facility])        Past Surgical History:  Past Surgical History:   Procedure Laterality Date    CARPAL TUNNEL RELEASE  1999,2000    Bilateral    CATARACT REMOVAL WITH IMPLANT      MARY EYES    COLONOSCOPY  02/2022    Dr. Norma Nichole    COLONOSCOPY N/A 02/28/2022    COLONOSCOPY performed by Manjeet Abdullahi MD at [address] Left 03/08/2018    left ring finger    HAND SURGERY  2002, 2008    Left x5    HYSTERECTOMY (CERVIX STATUS UNKNOWN) Right 1998    x1 ovary removed    SHOULDER SURGERY  2005    Right       Current Medications:  Current Facility-Administered Medications   Medication Dose Route Frequency Provider Last Rate Last Admin    insulin lispro (HUMALOG) injection vial 4 Units  4 Units SubCUTAneous
noted  Sensation: Intact to all extremities to light touch  Coordination: Intact      Cranial Nerves:  II: Visual acuity not tested, denies new visual changes / diplopia  III, IV, VI: PERRL, 3 mm bilaterally, EOMI, no nystagmus noted  V: Facial sensation intact bilaterally to touch  VII: Face symmetric  VIII: Hearing intact bilaterally to spoken voice  IX: Palate movement equal bilaterally  XI: Shoulder shrug equal bilaterally  XII: Tongue midline    Musculoskeletal:   Gait: Not tested   Assist devices: None   Tone: normal  Motor strength:    Right  Left    Right  Left    Deltoid  4 5  Hip Flex  4 5   Biceps  4 5  Knee Extensors  4 5   Triceps  4 5  Knee Flexors  4 5   Wrist Ext  4 5  Ankle Dorsiflex. 4 5   Wrist Flex  4 5  Ankle Plantarflex. 4 5   Handgrip  4 5  Ext Jace Longus  4 5   Thumb Ext  4 5         Radiological Findings:  XR CHEST PORTABLE    Result Date: 12/20/2023  Similar vascular prominence in the lungs and atelectasis as compared to the prior studies dated 09/22/2023. No new finding or evidence of acute process. CT HEAD WO CONTRAST    Addendum Date: 12/20/2023    ADDENDUM: The findings were sent to the Radiology Results [place] at 4:38 pm on 12/20/2023 to be communicated to a licensed caregiver, received by Dr. Junior Jennings at 4:40 pm.     Result Date: 12/20/2023  1. No acute intracranial abnormality. 2. Again seen is ill-defined low attenuation throughout the left thalamus, insular cortex and throughout majority of the left temporal lobe suspicious for underlying glioma, as identified on the MR brain from 11/24/2023. 3.  No flow limiting stenosis or occlusion within the head or neck. CTA HEAD NECK W CONTRAST    Addendum Date: 12/20/2023    ADDENDUM: The findings were sent to the Radiology Results [place] at 4:38 pm on 12/20/2023 to be communicated to a licensed caregiver, received by Dr. Junior Jennings at 4:40 pm.     Result Date: 12/20/2023  1.  No acute intracranial
medical decisions.      Electronically signed by Gibson Horne MD on 12/21/2023 at 5:40 PM
will see her as an outpatient follow-up in about 2 weeks at [facility] at which point we should have the final pathology and we will go into much further detail with regards to her management options.       Ammy Cerda MD  Please Contact Through [facility]

## 2023-12-24 NOTE — DISCHARGE INSTR - DIET

## 2023-12-25 PROBLEM — Z71.89 BRAIN TUMOR CONSULTATION: Status: ACTIVE | Noted: 2023-12-21

## 2023-12-25 NOTE — PROCEDURES
INTERPRETATION:  This more than 2-hour, rapid 8-channel EEG recording is abnormal.  It showed moderate degree of generalized slowing background activity. No potentially epileptiform activity was present during the recording. The EEG findings were consistent with moderate degree of generalized non-specific cerebral dysfunction. CLASSIFICATION:  Dysrhythmia grade 2, generalized. DESCRIPTION:  BACKGROUND:  The EEG background showed continuous generalized low amplitude intermixed 2 to 7 Hz theta/delta activity. The EEG showed fair variability and reactivity.       INTERICTAL DISCHARGES: None    SEIZURE BURDEN: 0%

## 2023-12-26 ENCOUNTER — CARE COORDINATION (OUTPATIENT)
Dept: CASE MANAGEMENT | Age: 73
End: 2023-12-26

## 2023-12-26 ENCOUNTER — ENROLLMENT (OUTPATIENT)
Dept: CASE MANAGEMENT | Age: 73
End: 2023-12-26

## 2023-12-26 DIAGNOSIS — I10 ESSENTIAL HYPERTENSION: ICD-10-CM

## 2023-12-26 RX ORDER — POTASSIUM CHLORIDE 750 MG/1
10 TABLET, EXTENDED RELEASE ORAL EVERY OTHER DAY
Qty: 45 TABLET | Refills: 0 | Status: SHIPPED | OUTPATIENT
Start: 2023-12-26

## 2023-12-26 NOTE — CARE COORDINATION
[facility] Transitions Initial Follow Up Call    Call within 2 business days of discharge: Yes    Patient:  Elizabeth OROZCO   Patient :  1950  MRN:  [id number]     Reason for Admission: Acute Encephalopathy, ARF with Hypercarbia with Hypoxia, S/P Brain Tumor Bx   Discharge Date:  23    RARS:       Transitions of Care Initial Call    Was this an external facility discharge? Discharge Facility: [address] [place] [address] to be reviewed by the provider   Additional needs identified to be addressed with provider:    no    AMB CC Provider Discharge Needs: none            Non-face-to-face services provided:    1ST CTC attempt to reach Pt regarding recent hospital discharge. CTC left voice recording with call back number requesting a call back.     Follow up appointments:    Future Appointments   Date Time Provider [address]   2023 10:40 AM Bala Goldstein APRN - CNP [address]     Thank You,    Haris Shahid, RN  Care Transition Coordinator  Contact [place]:[phone]

## 2023-12-26 NOTE — PATIENT INSTRUCTIONS
[facility] - Leyla Kuo MD  [facility], [address]  [place] [address]  Phone: [phone]  Fax: [phone]  Call to schedule an appointment      You may receive a survey regarding the care you received during your visit.  Your input is valuable to us.  We encourage you to complete and return your survey.  We hope you will choose us in the future for your healthcare needs. GENERAL OFFICE POLICIES      Telephone Calls: Messages will be answered within 1-2 business days, unless the provider is out of the office.  If it is urgent a covering provider will answer. (this does not include Medication refills).    MyChart:  We recommend all patients sign up for [employer].  Through this portal you can see your lab results, request refills, schedule appointments, pay your bill and send messages to the office.   everyArthart messages will be answered within 1-2 business days unless the provider is out of the office.  For urgent matters, please call the office.  Appointments:  All appointments must be scheduled.  We ask all patients to schedule their next follow up appointment before they leave the office to make sure you will be able to be seen before you run out of medications.  24 hours notice is required to cancel or reschedule an appointment to avoid being marked as a no show.  You may be dismissed from the practice after 3 no shows.    LATE for Appointment: If you are 15 or more minutes late for your appointment, you may be asked to reschedule.  MA/LAB APPTS: Must be scheduled, cannot accept walk in lab visits.  We only draw labs for patients established in our office.  We only do injections for medications ordered by our office.  Acute Sick Visits:  Nothing other than acute complaint will be addressed at this visit.  TRADITIONAL MEDICARE  DOES NOT COVER PHYSICALS  MEDICARE WELLNESS VISITS: These are NOT physicals but the free annual visit offered by Medicare to discuss wellness issues. Medication refills,

## 2023-12-27 ENCOUNTER — CARE COORDINATION (OUTPATIENT)
Dept: CASE MANAGEMENT | Age: 73
End: 2023-12-27

## 2023-12-27 ENCOUNTER — OFFICE VISIT (OUTPATIENT)
Dept: FAMILY MEDICINE CLINIC | Age: 73
End: 2023-12-27

## 2023-12-27 VITALS
HEART RATE: 89 BPM | OXYGEN SATURATION: 99 % | WEIGHT: 293 LBS | SYSTOLIC BLOOD PRESSURE: 136 MMHG | HEIGHT: 65 IN | DIASTOLIC BLOOD PRESSURE: 58 MMHG | BODY MASS INDEX: 48.82 KG/M2

## 2023-12-27 DIAGNOSIS — R56.9 CONVULSIONS, UNSPECIFIED CONVULSION TYPE (HCC): ICD-10-CM

## 2023-12-27 DIAGNOSIS — C71.9 GLIOMA (HCC): Primary | ICD-10-CM

## 2023-12-27 DIAGNOSIS — Z79.4 TYPE 2 DIABETES MELLITUS WITHOUT COMPLICATION, WITH LONG-TERM CURRENT USE OF INSULIN (HCC): ICD-10-CM

## 2023-12-27 DIAGNOSIS — E11.9 TYPE 2 DIABETES MELLITUS WITHOUT COMPLICATION, WITH LONG-TERM CURRENT USE OF INSULIN (HCC): ICD-10-CM

## 2023-12-27 DIAGNOSIS — G40.89 OTHER SEIZURES (HCC): ICD-10-CM

## 2023-12-27 LAB — HBA1C MFR BLD: 8.2 %

## 2023-12-27 NOTE — PROGRESS NOTES
course: Discharge summary reviewed- see chart.    Interval history/Current status:     Patient Active Problem List   Diagnosis    Type 2 diabetes mellitus with stable proliferative retinopathy of right eye, with long-term current use of insulin ([facility])    Primary hypertension    Post-menopausal    Morbid obesity due to excess calories ([facility])    Trigger ring finger of left hand    OAB (overactive bladder)    Proliferative diabetic retinopathy, right eye ([facility])    Venous insufficiency of both lower extremities    Hyperuricemia    Primary osteoarthritis of both knees    Body mass index (BMI) 50.0-59.9, adult ([facility])    Abnormal brain MRI    Aphasia    Right-sided visual neglect    AMS (altered mental status)    Brain tumor consultation       Medications listed as ordered at the time of discharge from hospital     Medication List            Accurate as of December 27, 2023 10:55 AM. If you have any questions, ask your nurse or doctor.                CHANGE how you take these medications      metFORMIN 1000 MG tablet  Commonly known as: GLUCOPHAGE  1 tab at breakfast 0.5 tab at dinner  What changed: additional instructions            CONTINUE taking these medications      Accu-Chek Guide strip  Generic drug: blood glucose test strips  USE 1 STRIP TO CHECK GLUCOSE 4 TIMES DAILY     allopurinol 300 MG tablet  Commonly known as: ZYLOPRIM  Take 1 tablet by mouth daily     amLODIPine 5 MG tablet  Commonly known as: NORVASC  Take 1 tablet by mouth daily     * Dexcom G6  Nellie  Use 3-4 times daily to check glucose continually     * Dexcom G7  Nellie  USE TO CHECK BLOOD SUGAR 3-4 X DAILY     Dexcom G6 Sensor Misc  Use 3-4 times daily to check glucose continually     Dexcom G6 Transmitter Misc  Use 3-4 times daily to check glucose continually.     ezetimibe 10 MG tablet  Commonly known as: ZETIA  Take 1 tablet by mouth daily     insulin  UNIT/ML injection vial  Commonly known as: HumuLIN N  INJECT SUBCUTANEOUSLY 45

## 2023-12-27 NOTE — CARE COORDINATION
[facility] Transitions Initial Follow Up Call    Call within 2 business days of discharge: Yes    Patient:  Jacinta OROZCO   Patient :  1950  MRN:  [id number]     Reason for Admission:  Acute Encephalopathy, ARF with Hypercarbia with Hypoxia, S/P Brain Tumor Bx   Discharge Date:  23    RARS:       Transitions of Care Initial Call    Was this an external facility discharge? Discharge Facility: [address] [place] [address] to be reviewed by the provider   Additional needs identified to be addressed with provider:    no    AMB CC Provider Discharge Needs: none            Non-face-to-face services provided:    2ND CTC attempt to reach Pt regarding recent hospital discharge. CTC left voice recording with call back number requesting a call back. CTN will close out CTN episode at this time. Follow up appointments:    No future appointments.     Thank Fam Rivera RN  Care Transition Coordinator  Contact NPXJCI:[phone]

## 2023-12-28 ENCOUNTER — TELEPHONE (OUTPATIENT)
Dept: FAMILY MEDICINE CLINIC | Age: 73
End: 2023-12-28

## 2023-12-28 NOTE — TELEPHONE ENCOUNTER
Patient was calling to let us know the information She thinks its her Neurologists     Dr Darshan Bkaer   The location is [facility]   1/2/24 at 2 o'clock

## 2023-12-29 NOTE — TELEPHONE ENCOUNTER
Please let her know this in the oncologist not her neurologist ( she has a glioma - brain tumor) As above

## 2023-12-29 NOTE — TELEPHONE ENCOUNTER
Pt is aware it's oncologist but she just want it to update you for her upcoming appt with Dr. Luli Dave.  MA

## 2024-01-03 ENCOUNTER — TELEPHONE (OUTPATIENT)
Dept: FAMILY MEDICINE CLINIC | Age: 74
End: 2024-01-03

## 2024-01-03 LAB
Lab: NORMAL
REPORT: NORMAL
THIS TEST SENT TO: NORMAL

## 2024-01-03 NOTE — TELEPHONE ENCOUNTER
Pt is wanting to speak to Lety.  Pt is trying to find some test results.  She had this done at [facility]-Dec 22, 2023.  It was a Biopsy of her brain.    She wants to speak to you about this.    She is looking in her mychart and can not find it.  She feels like something is not being told to her.

## 2024-01-16 DIAGNOSIS — I10 ESSENTIAL HYPERTENSION: ICD-10-CM

## 2024-01-17 RX ORDER — AMLODIPINE BESYLATE 5 MG/1
5 TABLET ORAL DAILY
Qty: 100 TABLET | Refills: 1 | Status: SHIPPED | OUTPATIENT
Start: 2024-01-17

## 2024-01-17 RX ORDER — TELMISARTAN 80 MG/1
80 TABLET ORAL DAILY
Qty: 100 TABLET | Refills: 1 | Status: SHIPPED | OUTPATIENT
Start: 2024-01-17

## 2024-02-13 ENCOUNTER — HOSPITAL ENCOUNTER (OUTPATIENT)
Dept: MRI IMAGING | Age: 74
Discharge: HOME | End: 2024-02-13
Attending: RADIOLOGY
Payer: MEDICARE

## 2024-02-13 DIAGNOSIS — C71.2 MALIGNANT NEOPLASM OF TEMPORAL LOBE OF BRAIN (HCC): ICD-10-CM

## 2024-02-13 PROCEDURE — 70553 MRI BRAIN STEM W/O & W/DYE: CPT

## 2024-02-13 PROCEDURE — 6360000004 HC RX CONTRAST MEDICATION: Performed by: RADIOLOGY

## 2024-02-13 PROCEDURE — A9576 INJ PROHANCE MULTIPACK: HCPCS | Performed by: RADIOLOGY

## 2024-02-13 RX ADMIN — GADOTERIDOL 20 ML: 279.3 INJECTION, SOLUTION INTRAVENOUS at 17:06

## 2024-02-27 ENCOUNTER — OFFICE VISIT (OUTPATIENT)
Dept: FAMILY MEDICINE CLINIC | Age: 74
End: 2024-02-27
Payer: MEDICARE

## 2024-02-27 VITALS
OXYGEN SATURATION: 98 % | SYSTOLIC BLOOD PRESSURE: 124 MMHG | BODY MASS INDEX: 48.82 KG/M2 | HEIGHT: 65 IN | WEIGHT: 293 LBS | HEART RATE: 80 BPM | DIASTOLIC BLOOD PRESSURE: 70 MMHG

## 2024-02-27 DIAGNOSIS — R56.9 CONVULSIONS, UNSPECIFIED CONVULSION TYPE (HCC): Primary | ICD-10-CM

## 2024-02-27 DIAGNOSIS — C71.9 GLIOMA (HCC): ICD-10-CM

## 2024-02-27 PROCEDURE — 99212 OFFICE O/P EST SF 10 MIN: CPT | Performed by: NURSE PRACTITIONER

## 2024-02-27 PROCEDURE — 3074F SYST BP LT 130 MM HG: CPT | Performed by: NURSE PRACTITIONER

## 2024-02-27 PROCEDURE — 1123F ACP DISCUSS/DSCN MKR DOCD: CPT | Performed by: NURSE PRACTITIONER

## 2024-02-27 PROCEDURE — 3078F DIAST BP <80 MM HG: CPT | Performed by: NURSE PRACTITIONER

## 2024-02-27 ASSESSMENT — PATIENT HEALTH QUESTIONNAIRE - PHQ9
SUM OF ALL RESPONSES TO PHQ QUESTIONS 1-9: 0
SUM OF ALL RESPONSES TO PHQ9 QUESTIONS 1 & 2: 0
2. FEELING DOWN, DEPRESSED OR HOPELESS: 0
SUM OF ALL RESPONSES TO PHQ QUESTIONS 1-9: 0
SUM OF ALL RESPONSES TO PHQ QUESTIONS 1-9: 0
1. LITTLE INTEREST OR PLEASURE IN DOING THINGS: 0
SUM OF ALL RESPONSES TO PHQ QUESTIONS 1-9: 0

## 2024-02-27 NOTE — PROGRESS NOTES
Dania Baez (:  1950) is a 73 y.o. female,Established patient, here for evaluation of the following chief complaint(s):    Brain Tumor (DISCUSS NEW DIAGNOSIS )      SUBJECTIVE/OBJECTIVE:  HPI  MRS OLGUIN WOULD LIKE TO SEE A DIFFERENT  ONCOLOGIST- DIAGNOSED WITH A GLIOMA  LAST  YEAR AFTER HAVING DIFFICULTY TALKING - CONFUSION   IMAGING COMPLETED - SHE IS   GOING TO DO  RADIATION TODAY -  SHE IS TAKING PILL S NO INJECTION - IV CHEMO AT THIS TIME  STATES SHE HAS NOT HAD ANY STEROIDS  ONLY TAKING KEEPRA FOR SEIZURES  SHE HAS GREAT GÉNESIS     HER BLOOD SUGARS HAVE BEEN HIGH  AT TIMES  SHE HAS NOT CHANGED HOW SHE IS EATING  FASTING GLUCOSE 150   USING NOVOLIN  200 AFTER EATING TOAST AND PEANUT BUTTER  HER B/P HAS BEEN AS LOW AS 85- SHE TOOK A SPOONFUL OF SUGAR AND IT DID NOT GET ANY HIGHER  WENT  - HAD TO DRINK POP THIS DID NOT        Review of Systems   Neurological: Negative.        Physical Exam  Vitals reviewed.   Constitutional:       General: She is not in acute distress.  Cardiovascular:      Rate and Rhythm: Normal rate and regular rhythm.      Heart sounds: Normal heart sounds, S1 normal and S2 normal.   Pulmonary:      Effort: Pulmonary effort is normal.      Breath sounds: Normal breath sounds and air entry.   Neurological:      Mental Status: She is alert and oriented to person, place, and time.   Psychiatric:         Attention and Perception: Attention and perception normal.         Mood and Affect: Mood and affect normal.         Speech: Speech normal.         Behavior: Behavior normal. Behavior is cooperative.         Thought Content: Thought content normal.         Cognition and Memory: Cognition and memory normal.         Judgment: Judgment normal.         Dania was seen today for brain tumor.    Diagnoses and all orders for this visit:    Convulsions, unspecified convulsion type (HCC)  Glioma (HCC)  ADVISED TO FOLLOW UP WITH ONCOLOGY  GLIOMA DW PT -  SHE WILL  CONTINUE CARE WITH

## 2024-02-27 NOTE — PATIENT INSTRUCTIONS
Refills are handled electronically so please contact your pharmacy for medication refills even if current refills have been exhausted. If you are on a controlled medication you will be referred to a specialist (pain specialist, psychiatry, etc).   Forms: There is a $35 fee to fill out FMLA/Disability paperwork, payable at time of . Instead of the fee, you can choose to have the paperwork filled out during a separate office visit that is for filling out the paperwork only.  Medication Samples:  This office does not carry medication samples.  If you need assistance in getting your medications, then please let the medical assistant know so they can help you sign up for a drug assistance program that can help get medications at a reduced cost or even free (if you qualify).  Workman's Comp Claims: We do not handle workman's comp cases or claims. You will need to go to an urgent care to be seen or to whomever your employer uses.  General - Any abusive/rude behavior toward staff/providers may be cause for dismissal.      WE NOW OFFER GLSS SELF-SCHEDULING   IN 3 EASY STEPS    SCHEDULE AN APPT AT YOUR CONVENIENCE WITH NO HOLD/WAIT TIME  IN THE GLSS MAE SELECT 'SCHEDULE AN APPOINTMENT' FROM THE MENU  CHOOSE DATE/TIME THAT WORKS FOR YOU    If you don't find an appointment time that works for your schedule, you can also submit an appointment request thru [employer].    CONVENIENT QUALITY CARE AT YOUR FINGERTIPS    NOT ON PheedT?  PLEASE ASK ANY STAFF MEMBER

## 2024-03-14 DIAGNOSIS — I10 ESSENTIAL HYPERTENSION: ICD-10-CM

## 2024-03-14 DIAGNOSIS — E11.9 TYPE 2 DIABETES MELLITUS WITHOUT COMPLICATION, WITH LONG-TERM CURRENT USE OF INSULIN (HCC): ICD-10-CM

## 2024-03-14 DIAGNOSIS — Z79.4 TYPE 2 DIABETES MELLITUS WITHOUT COMPLICATION, WITH LONG-TERM CURRENT USE OF INSULIN (HCC): ICD-10-CM

## 2024-03-14 RX ORDER — POTASSIUM CHLORIDE 750 MG/1
10 TABLET, EXTENDED RELEASE ORAL EVERY OTHER DAY
Qty: 45 TABLET | Refills: 1 | Status: SHIPPED | OUTPATIENT
Start: 2024-03-14

## 2024-04-02 NOTE — PATIENT INSTRUCTIONS
[facility] Financial Resources*  (Call 211 if need more resources.)     [facility] Financial Assistance  What they offer: Financial assistance programs that are designed to assist you in finding resources that may help pay your hospital bill. Please click on the links below to learn more about the financial assistance programs available within our regions.  Phone Number: [phone]  How to apply for the [facility] Financial Assistance Program:       Option 1: To apply for financial assistance, a patient (or their family or other provider) should fill out the Financial Assistance Application. Copies of the Financial Assistance Application and the FAP may be obtained for free by calling the [facility] Customer Service department at [phone]   Option 2: The Financial Assistance Application and policy may be obtained for free by downloading a copy from the [employer] website:  https://www.cafegive/patient-resources/financial-assistance  [facility] Meet[employer] Partnership Program  What they offer: If financial strain is hindering your ability to meet health care needs, the [employer] Partnership Program may be able to help. It provides support to patients facing complex health issues and social barriers. These services are provided at no cost.   Eligible Patients  Adults who are at or below 200% poverty level  Uninsured, underinsured, or those at risk of losing health insurance  You may be eligible to receive:  One-on-one support enrolling in Medicaid, Marketplace health care coverage, financial assistance, and other benefit programs  Short-term or long-term case management to support and help connect you to appropriate health care services and community-based services.   Assistance with Primary care and prescription costs   referrals as a partnering agency with [employer] to support those overcoming homelessness and living in extreme poverty.   How to Contact:   Phone: Main line 
[employer] Inter[employer]

## 2024-04-03 ENCOUNTER — OFFICE VISIT (OUTPATIENT)
Dept: FAMILY MEDICINE CLINIC | Age: 74
End: 2024-04-03
Payer: MEDICARE

## 2024-04-03 VITALS
OXYGEN SATURATION: 97 % | HEART RATE: 92 BPM | HEIGHT: 65 IN | WEIGHT: 293 LBS | BODY MASS INDEX: 48.82 KG/M2 | SYSTOLIC BLOOD PRESSURE: 126 MMHG | DIASTOLIC BLOOD PRESSURE: 78 MMHG

## 2024-04-03 DIAGNOSIS — E11.9 TYPE 2 DIABETES MELLITUS WITHOUT COMPLICATION, WITH LONG-TERM CURRENT USE OF INSULIN (HCC): Primary | ICD-10-CM

## 2024-04-03 DIAGNOSIS — Z79.4 TYPE 2 DIABETES MELLITUS WITHOUT COMPLICATION, WITH LONG-TERM CURRENT USE OF INSULIN (HCC): Primary | ICD-10-CM

## 2024-04-03 DIAGNOSIS — E79.0 HYPERURICEMIA: ICD-10-CM

## 2024-04-03 LAB
CREATININE URINE POCT: 100
HBA1C MFR BLD: 7.4 %
MICROALBUMIN/CREAT 24H UR: 10 MG/G{CREAT}
MICROALBUMIN/CREAT UR-RTO: 30
URATE SERPL-MCNC: 4.1 MG/DL (ref 2.6–6)

## 2024-04-03 PROCEDURE — 3074F SYST BP LT 130 MM HG: CPT | Performed by: NURSE PRACTITIONER

## 2024-04-03 PROCEDURE — 99213 OFFICE O/P EST LOW 20 MIN: CPT | Performed by: NURSE PRACTITIONER

## 2024-04-03 PROCEDURE — 3051F HG A1C>EQUAL 7.0%<8.0%: CPT | Performed by: NURSE PRACTITIONER

## 2024-04-03 PROCEDURE — 82044 UR ALBUMIN SEMIQUANTITATIVE: CPT | Performed by: NURSE PRACTITIONER

## 2024-04-03 PROCEDURE — 36415 COLL VENOUS BLD VENIPUNCTURE: CPT | Performed by: NURSE PRACTITIONER

## 2024-04-03 PROCEDURE — 83036 HEMOGLOBIN GLYCOSYLATED A1C: CPT | Performed by: NURSE PRACTITIONER

## 2024-04-03 PROCEDURE — 1123F ACP DISCUSS/DSCN MKR DOCD: CPT | Performed by: NURSE PRACTITIONER

## 2024-04-03 PROCEDURE — 3078F DIAST BP <80 MM HG: CPT | Performed by: NURSE PRACTITIONER

## 2024-04-03 SDOH — ECONOMIC STABILITY: INCOME INSECURITY: HOW HARD IS IT FOR YOU TO PAY FOR THE VERY BASICS LIKE FOOD, HOUSING, MEDICAL CARE, AND HEATING?: NOT HARD AT ALL

## 2024-04-03 SDOH — ECONOMIC STABILITY: FOOD INSECURITY: WITHIN THE PAST 12 MONTHS, YOU WORRIED THAT YOUR FOOD WOULD RUN OUT BEFORE YOU GOT MONEY TO BUY MORE.: NEVER TRUE

## 2024-04-03 SDOH — ECONOMIC STABILITY: FOOD INSECURITY: WITHIN THE PAST 12 MONTHS, THE FOOD YOU BOUGHT JUST DIDN'T LAST AND YOU DIDN'T HAVE MONEY TO GET MORE.: NEVER TRUE

## 2024-05-09 DIAGNOSIS — I10 ESSENTIAL HYPERTENSION: ICD-10-CM

## 2024-05-09 RX ORDER — TRIAMTERENE AND HYDROCHLOROTHIAZIDE 37.5; 25 MG/1; MG/1
1 TABLET ORAL DAILY
Qty: 90 TABLET | Refills: 1 | Status: SHIPPED | OUTPATIENT
Start: 2024-05-09

## 2024-06-01 DIAGNOSIS — I10 ESSENTIAL HYPERTENSION: ICD-10-CM

## 2024-06-03 RX ORDER — POTASSIUM CHLORIDE 750 MG/1
10 TABLET, EXTENDED RELEASE ORAL EVERY OTHER DAY
Qty: 50 TABLET | Refills: 2 | Status: SHIPPED | OUTPATIENT
Start: 2024-06-03

## 2024-06-04 ENCOUNTER — TELEPHONE (OUTPATIENT)
Dept: PHARMACY | Facility: CLINIC | Age: 74
End: 2024-06-04

## 2024-06-04 NOTE — TELEPHONE ENCOUNTER
[facility] CLINICAL PHARMACY: ADHERENCE REVIEW  Identified care gap per [employer]: fills at [facility]: ACE/ARB and Diabetes adherence    ASSESSMENT    ACE/ARB ADHERENCE    Insurance Records claims through 2024 (Prior Year PDC = 100% - PASSED ; YTD PDC = PASSED):     Telmisartan 80mg last filled on 24 for 100 day supply. Next refill due: 8.3.24    Prescribed si tablet/capsule daily    Per Insurer Portal: same as above.    Will need future refills. OV 24.    BP Readings from Last 3 Encounters:   24 126/78   24 124/70   23 (!) 136/58     Estimated Creatinine Clearance: 65 mL/min (based on SCr of 1.1 mg/dL).  Lab Results   Component Value Date    CREATININE 1.1 2023     Lab Results   Component Value Date    K 3.9 2023     DIABETES ADHERENCE    Insurance Records claims through 2024 (Prior Year PDC = not reported; YTD PDC = FIRST FILL; Potential Fail Date: 24):     Metformin 1,000mg last filled on 24 for 90 day supply. Next refill due: 24    Prescribed sig:  one tablet at breakfast and half a tablet at dinner    Per Insurer Portal: same as above.    Per [facility]:  script on file written 3/14/24 135 with 1 refill.    Lab Results   Component Value Date    LABA1C 7.4 2024    LABA1C 8.2 2023    LABA1C 7.7 2023     NOTE: A1c >9%    PLAN  Per insurer report, LIS-0 - co-pays are based on tiers and patient is subject to coverage gap.    The following are interventions that have been identified:   Patient OVERDUE refilling Metformin 1,000mg and active on home medication list.     Attempting to reach patient to review.  Left message asking for return call. [employer] message sent to patient.      Last Visit: 4.3.24  Next Visit: 24  Recent Visits  Date Type Provider Dept   24 Office Visit Lety Snyder APRN - CNP [facility]    24 Office Visit Lety Snyder APRN - CNP [facility]    23

## 2024-06-28 ENCOUNTER — HOSPITAL ENCOUNTER (OUTPATIENT)
Dept: MRI IMAGING | Age: 74
Discharge: HOME | End: 2024-06-28
Attending: STUDENT IN AN ORGANIZED HEALTH CARE EDUCATION/TRAINING PROGRAM
Payer: MEDICARE

## 2024-06-28 DIAGNOSIS — C71.2 GLIOBLASTOMA OF TEMPORAL LOBE (HCC): ICD-10-CM

## 2024-06-28 LAB
CREAT SERPL-MCNC: 1.9 MG/DL (ref 0.6–1.2)
GFR SERPLBLD CREATININE-BSD FMLA CKD-EPI: 28 ML/MIN/{1.73_M2}

## 2024-06-28 PROCEDURE — 70551 MRI BRAIN STEM W/O DYE: CPT

## 2024-06-28 PROCEDURE — 36415 COLL VENOUS BLD VENIPUNCTURE: CPT

## 2024-06-28 PROCEDURE — 82565 ASSAY OF CREATININE: CPT

## 2024-06-28 RX ORDER — SODIUM CHLORIDE 0.9 % (FLUSH) 0.9 %
10 SYRINGE (ML) INJECTION PRN
Status: DISCONTINUED | OUTPATIENT
Start: 2024-06-28 | End: 2024-06-29 | Stop reason: HOSPADM

## 2024-06-29 DIAGNOSIS — I10 ESSENTIAL HYPERTENSION: ICD-10-CM

## 2024-07-01 RX ORDER — AMLODIPINE BESYLATE 5 MG/1
5 TABLET ORAL DAILY
Qty: 100 TABLET | Refills: 0 | Status: SHIPPED | OUTPATIENT
Start: 2024-07-01

## 2024-07-01 RX ORDER — TELMISARTAN 80 MG/1
80 TABLET ORAL DAILY
Qty: 100 TABLET | Refills: 0 | Status: SHIPPED | OUTPATIENT
Start: 2024-07-01

## 2024-07-20 DIAGNOSIS — I10 ESSENTIAL HYPERTENSION: ICD-10-CM

## 2024-07-21 RX ORDER — TRIAMTERENE AND HYDROCHLOROTHIAZIDE 37.5; 25 MG/1; MG/1
1 TABLET ORAL DAILY
Qty: 100 TABLET | Refills: 2 | Status: SHIPPED | OUTPATIENT
Start: 2024-07-21

## 2024-08-03 SDOH — HEALTH STABILITY: PHYSICAL HEALTH: ON AVERAGE, HOW MANY DAYS PER WEEK DO YOU ENGAGE IN MODERATE TO STRENUOUS EXERCISE (LIKE A BRISK WALK)?: 0 DAYS

## 2024-08-03 ASSESSMENT — PATIENT HEALTH QUESTIONNAIRE - PHQ9
SUM OF ALL RESPONSES TO PHQ QUESTIONS 1-9: 0
SUM OF ALL RESPONSES TO PHQ QUESTIONS 1-9: 0
SUM OF ALL RESPONSES TO PHQ9 QUESTIONS 1 & 2: 0
2. FEELING DOWN, DEPRESSED OR HOPELESS: NOT AT ALL
1. LITTLE INTEREST OR PLEASURE IN DOING THINGS: NOT AT ALL
SUM OF ALL RESPONSES TO PHQ QUESTIONS 1-9: 0
SUM OF ALL RESPONSES TO PHQ QUESTIONS 1-9: 0

## 2024-08-03 ASSESSMENT — LIFESTYLE VARIABLES
HOW MANY STANDARD DRINKS CONTAINING ALCOHOL DO YOU HAVE ON A TYPICAL DAY: PATIENT DOES NOT DRINK
HOW MANY STANDARD DRINKS CONTAINING ALCOHOL DO YOU HAVE ON A TYPICAL DAY: 0
HOW OFTEN DO YOU HAVE A DRINK CONTAINING ALCOHOL: NEVER
HOW OFTEN DO YOU HAVE A DRINK CONTAINING ALCOHOL: 1

## 2024-08-05 ENCOUNTER — OFFICE VISIT (OUTPATIENT)
Dept: FAMILY MEDICINE CLINIC | Age: 74
End: 2024-08-05
Payer: MEDICARE

## 2024-08-05 VITALS
HEART RATE: 62 BPM | HEIGHT: 65 IN | DIASTOLIC BLOOD PRESSURE: 84 MMHG | OXYGEN SATURATION: 97 % | SYSTOLIC BLOOD PRESSURE: 126 MMHG | BODY MASS INDEX: 44.98 KG/M2 | WEIGHT: 270 LBS

## 2024-08-05 DIAGNOSIS — C71.9 GLIOMA (HCC): ICD-10-CM

## 2024-08-05 DIAGNOSIS — E11.3551 TYPE 2 DIABETES MELLITUS WITH STABLE PROLIFERATIVE RETINOPATHY OF RIGHT EYE, WITH LONG-TERM CURRENT USE OF INSULIN (HCC): ICD-10-CM

## 2024-08-05 DIAGNOSIS — I10 PRIMARY HYPERTENSION: ICD-10-CM

## 2024-08-05 DIAGNOSIS — Z79.4 TYPE 2 DIABETES MELLITUS WITH STABLE PROLIFERATIVE RETINOPATHY OF RIGHT EYE, WITH LONG-TERM CURRENT USE OF INSULIN (HCC): ICD-10-CM

## 2024-08-05 DIAGNOSIS — Z00.00 MEDICARE ANNUAL WELLNESS VISIT, SUBSEQUENT: Primary | ICD-10-CM

## 2024-08-05 LAB
ANION GAP SERPL CALCULATED.3IONS-SCNC: 14 MMOL/L (ref 3–16)
BUN SERPL-MCNC: 22 MG/DL (ref 7–20)
CALCIUM SERPL-MCNC: 10 MG/DL (ref 8.3–10.6)
CHLORIDE SERPL-SCNC: 100 MMOL/L (ref 99–110)
CO2 SERPL-SCNC: 23 MMOL/L (ref 21–32)
CREAT SERPL-MCNC: 1.7 MG/DL (ref 0.6–1.2)
GFR SERPLBLD CREATININE-BSD FMLA CKD-EPI: 32 ML/MIN/{1.73_M2}
GLUCOSE SERPL-MCNC: 115 MG/DL (ref 70–99)
HBA1C MFR BLD: 6.9 %
POTASSIUM SERPL-SCNC: 4 MMOL/L (ref 3.5–5.1)
SODIUM SERPL-SCNC: 137 MMOL/L (ref 136–145)

## 2024-08-05 PROCEDURE — G0439 PPPS, SUBSEQ VISIT: HCPCS | Performed by: NURSE PRACTITIONER

## 2024-08-05 PROCEDURE — 1123F ACP DISCUSS/DSCN MKR DOCD: CPT | Performed by: NURSE PRACTITIONER

## 2024-08-05 PROCEDURE — 3044F HG A1C LEVEL LT 7.0%: CPT | Performed by: NURSE PRACTITIONER

## 2024-08-05 PROCEDURE — 83036 HEMOGLOBIN GLYCOSYLATED A1C: CPT | Performed by: NURSE PRACTITIONER

## 2024-08-05 PROCEDURE — 3074F SYST BP LT 130 MM HG: CPT | Performed by: NURSE PRACTITIONER

## 2024-08-05 PROCEDURE — 36415 COLL VENOUS BLD VENIPUNCTURE: CPT | Performed by: NURSE PRACTITIONER

## 2024-08-05 PROCEDURE — 3079F DIAST BP 80-89 MM HG: CPT | Performed by: NURSE PRACTITIONER

## 2024-08-05 RX ORDER — AMMONIUM LACTATE 12 G/100G
CREAM TOPICAL
Qty: 385 G | Refills: 4 | Status: SHIPPED | OUTPATIENT
Start: 2024-08-05 | End: 2024-09-04

## 2024-08-05 NOTE — PROGRESS NOTES
Medicare Annual Wellness Visit    Dania Baez is here for Medicare AWV (MEDICARE AWV AND FASTING LABS)    Assessment & Plan   Medicare annual wellness visit, subsequent  Recommendations for Preventive Services Due: see orders and patient instructions/AVS.  Recommended screening schedule for the next 5-10 years is provided to the patient in written form: see Patient Instructions/AVS.     Dania was seen today for medicare awv.    Diagnoses and all orders for this visit:    Medicare annual wellness visit, subsequent  Recommendations for Preventive Services Due: see orders and patient instructions/AVS.  Recommended screening schedule for the next 5-10 years is provided to the patient in written form: see Patient Instructions/AVS.  Primary hypertension  STABLE ON CURRENT MEDICATIONS    Glioma (HCC)  FOLLOW UP WITH ONCOLOGY  Type 2 diabetes mellitus with stable proliferative retinopathy of right eye, with long-term current use of insulin (HCC)  POCT glycosylated hemoglobin (Hb A1C) 6.9  METFORMIN DISCONTINUED  CONTINUE INSULIN-   REGULAR INSULIN PER SLIDING SCALE  FOOT EXAM COMPLETED  Basic Metabolic Panel; Future      Other orders  -     ammonium lactate (AMLACTIN) 12 % cream; Apply topically as needed.          Subjective   The following acute and/or chronic problems were also addressed today:  Blood sugar  Has been dropping low at night- usually 100's when she notices it and she does something to get it up so it does not drop more- sometimes will be in 60's 70's  She tries to get her Blood sugar up over 200 now before bed- few hours after it gets to 100  She is not taking any medication at night anymore because of this- for few months now  She is not eating as much - decreased appetite    Doing well other than this  Is dealing with some fatigue from chemotherapy for glioblastoma - does 23 days off 5 days on    She cannot clean her house like she used to  due to the decreased energy         Patient's complete

## 2024-08-05 NOTE — PATIENT INSTRUCTIONS
8/5/2024  Medicare offers a range of preventive health benefits. Some of the tests and screenings are paid in full while other may be subject to a deductible, co-insurance, and/or copay.    Some of these benefits include a comprehensive review of your medical history including lifestyle, illnesses that may run in your family, and various assessments and screenings as appropriate.    After reviewing your medical record and screening and assessments performed today your provider may have ordered immunizations, labs, imaging, and/or referrals for you.  A list of these orders (if applicable) as well as your Preventive Care list are included within your After Visit Summary for your review.    Other Preventive Recommendations:    A preventive eye exam performed by an eye specialist is recommended every 1-2 years to screen for glaucoma; cataracts, macular degeneration, and other eye disorders.  A preventive dental visit is recommended every 6 months.  Try to get at least 150 minutes of exercise per week or 10,000 steps per day on a pedometer .  Order or download the FREE \"Exercise & Physical Activity: Your Everyday Guide\" from [employer]. Call 1-983.750.9036 or search [employer] online.  You need 9131-3459 mg of calcium and 1480-6568 IU of vitamin D per day. It is possible to meet your calcium requirement with diet alone, but a vitamin D supplement is usually necessary to meet this goal.  When exposed to the sun, use a sunscreen that protects against both UVA and UVB radiation with an SPF of 30 or greater. Reapply every 2 to 3 hours or after sweating, drying off with a towel, or swimming.  Always wear a seat belt when traveling in a car. Always wear a helmet when riding a bicycle or motorcycle.

## 2024-08-09 DIAGNOSIS — N28.9 FUNCTION KIDNEY DECREASED: Primary | ICD-10-CM

## 2024-09-07 DIAGNOSIS — I10 ESSENTIAL HYPERTENSION: ICD-10-CM

## 2024-09-09 RX ORDER — TELMISARTAN 80 MG/1
80 TABLET ORAL DAILY
Qty: 100 TABLET | Refills: 2 | Status: SHIPPED | OUTPATIENT
Start: 2024-09-09

## 2024-09-09 RX ORDER — AMLODIPINE BESYLATE 5 MG/1
5 TABLET ORAL DAILY
Qty: 100 TABLET | Refills: 2 | Status: SHIPPED | OUTPATIENT
Start: 2024-09-09

## 2024-10-04 ENCOUNTER — HOSPITAL ENCOUNTER (OUTPATIENT)
Dept: MRI IMAGING | Age: 74
Discharge: HOME | End: 2024-10-04
Attending: STUDENT IN AN ORGANIZED HEALTH CARE EDUCATION/TRAINING PROGRAM
Payer: MEDICARE

## 2024-10-04 DIAGNOSIS — C71.2 MALIGNANT NEOPLASM OF TEMPORAL LOBE OF BRAIN (HCC): ICD-10-CM

## 2024-10-04 PROCEDURE — 6360000004 HC RX CONTRAST MEDICATION: Performed by: STUDENT IN AN ORGANIZED HEALTH CARE EDUCATION/TRAINING PROGRAM

## 2024-10-04 PROCEDURE — 70553 MRI BRAIN STEM W/O & W/DYE: CPT

## 2024-10-04 PROCEDURE — A9579 GAD-BASE MR CONTRAST NOS,1ML: HCPCS | Performed by: STUDENT IN AN ORGANIZED HEALTH CARE EDUCATION/TRAINING PROGRAM

## 2024-10-04 RX ADMIN — GADOTERIDOL 20 ML: 279.3 INJECTION, SOLUTION INTRAVENOUS at 15:07

## 2024-10-27 ENCOUNTER — OFFICE VISIT (OUTPATIENT)
Age: 74
End: 2024-10-27

## 2024-10-27 VITALS
BODY MASS INDEX: 43.92 KG/M2 | HEART RATE: 96 BPM | HEIGHT: 65 IN | DIASTOLIC BLOOD PRESSURE: 53 MMHG | OXYGEN SATURATION: 98 % | TEMPERATURE: 97.7 F | WEIGHT: 263.6 LBS | SYSTOLIC BLOOD PRESSURE: 108 MMHG

## 2024-10-27 DIAGNOSIS — R35.0 FREQUENT URINATION: Primary | ICD-10-CM

## 2024-10-27 LAB
APPEARANCE FLUID: ABNORMAL
BILIRUBIN, POC: ABNORMAL
BLOOD URINE, POC: ABNORMAL
CLARITY, POC: ABNORMAL
COLOR, POC: YELLOW
GLUCOSE URINE, POC: ABNORMAL MG/DL
KETONES, POC: ABNORMAL MG/DL
LEUKOCYTE EST, POC: ABNORMAL
NITRITE, POC: ABNORMAL
PH, POC: 5.5
PROTEIN, POC: ABNORMAL MG/DL
SPECIFIC GRAVITY, POC: 1.01
UROBILINOGEN, POC: 1 MG/DL

## 2024-10-28 LAB — BACTERIA UR CULT: NORMAL

## 2024-11-05 ENCOUNTER — TELEPHONE (OUTPATIENT)
Dept: FAMILY MEDICINE CLINIC | Age: 74
End: 2024-11-05

## 2024-11-05 DIAGNOSIS — E11.3551 TYPE 2 DIABETES MELLITUS WITH STABLE PROLIFERATIVE RETINOPATHY OF RIGHT EYE, WITH LONG-TERM CURRENT USE OF INSULIN (HCC): Primary | ICD-10-CM

## 2024-11-05 DIAGNOSIS — Z79.4 TYPE 2 DIABETES MELLITUS WITH STABLE PROLIFERATIVE RETINOPATHY OF RIGHT EYE, WITH LONG-TERM CURRENT USE OF INSULIN (HCC): Primary | ICD-10-CM

## 2024-11-05 NOTE — TELEPHONE ENCOUNTER
Patient is calling because she needs a referral to ENDODONTOLOGIST - SHE IS HAVING TROUBLE WITH HER BLOOD SUGAR GOING LOW.    What we told her to try is not working.     She would like to see - Dr. Lindsay Lu - [facility]    Please give her a call back.

## 2024-11-08 ENCOUNTER — HOSPITAL ENCOUNTER (OUTPATIENT)
Age: 74
Setting detail: OBSERVATION
Discharge: HOME | End: 2024-11-09
Attending: EMERGENCY MEDICINE | Admitting: INTERNAL MEDICINE
Payer: MEDICARE

## 2024-11-08 DIAGNOSIS — D61.810 PANCYTOPENIA DUE TO CHEMOTHERAPY (HCC): ICD-10-CM

## 2024-11-08 DIAGNOSIS — D69.6 THROMBOCYTOPENIA (HCC): ICD-10-CM

## 2024-11-08 DIAGNOSIS — D64.9 ANEMIA, UNSPECIFIED TYPE: Primary | ICD-10-CM

## 2024-11-08 PROBLEM — D61.818 PANCYTOPENIA (HCC): Status: ACTIVE | Noted: 2024-11-08

## 2024-11-08 LAB
ABO + RH BLD: NORMAL
ALBUMIN SERPL-MCNC: 4.2 G/DL (ref 3.4–5)
ALBUMIN/GLOB SERPL: 1.2 {RATIO} (ref 1.1–2.2)
ALP SERPL-CCNC: 78 U/L (ref 40–129)
ALT SERPL-CCNC: 11 U/L (ref 10–40)
ANION GAP SERPL CALCULATED.3IONS-SCNC: 11 MMOL/L (ref 3–16)
AST SERPL-CCNC: 22 U/L (ref 15–37)
BASOPHILS # BLD: 0.1 K/UL (ref 0–0.2)
BASOPHILS NFR BLD: 2.8 %
BILIRUB SERPL-MCNC: 0.4 MG/DL (ref 0–1)
BLD GP AB SCN SERPL QL: NORMAL
BLOOD BANK DISPENSE STATUS: NORMAL
BLOOD BANK PRODUCT CODE: NORMAL
BPU ID: NORMAL
BUN SERPL-MCNC: 24 MG/DL (ref 7–20)
CALCIUM SERPL-MCNC: 10.3 MG/DL (ref 8.3–10.6)
CHLORIDE SERPL-SCNC: 98 MMOL/L (ref 99–110)
CO2 SERPL-SCNC: 20 MMOL/L (ref 21–32)
CREAT SERPL-MCNC: 1.7 MG/DL (ref 0.6–1.2)
DEPRECATED RDW RBC AUTO: 15.2 % (ref 12.4–15.4)
DESCRIPTION BLOOD BANK: NORMAL
EOSINOPHIL # BLD: 0 K/UL (ref 0–0.6)
EOSINOPHIL NFR BLD: 0.3 %
GFR SERPLBLD CREATININE-BSD FMLA CKD-EPI: 32 ML/MIN/{1.73_M2}
GLUCOSE BLD-MCNC: 234 MG/DL (ref 70–99)
GLUCOSE SERPL-MCNC: 232 MG/DL (ref 70–99)
HCT VFR BLD AUTO: 21 % (ref 36–48)
HGB BLD-MCNC: 7 G/DL (ref 12–16)
LYMPHOCYTES # BLD: 0.4 K/UL (ref 1–5.1)
LYMPHOCYTES NFR BLD: 20 %
MCH RBC QN AUTO: 33.9 PG (ref 26–34)
MCHC RBC AUTO-ENTMCNC: 33.1 G/DL (ref 31–36)
MCV RBC AUTO: 102.3 FL (ref 80–100)
MONOCYTES # BLD: 0.3 K/UL (ref 0–1.3)
MONOCYTES NFR BLD: 13 %
NEUTROPHILS # BLD: 1.3 K/UL (ref 1.7–7.7)
NEUTROPHILS NFR BLD: 63.9 %
PERFORMED ON: ABNORMAL
PLATELET # BLD AUTO: 14 K/UL (ref 135–450)
PLATELET BLD QL SMEAR: ABNORMAL
PMV BLD AUTO: 8.2 FL (ref 5–10.5)
POTASSIUM SERPL-SCNC: 4.8 MMOL/L (ref 3.5–5.1)
PROT SERPL-MCNC: 7.6 G/DL (ref 6.4–8.2)
RBC # BLD AUTO: 2.06 M/UL (ref 4–5.2)
SLIDE REVIEW: ABNORMAL
SODIUM SERPL-SCNC: 129 MMOL/L (ref 136–145)
SPECIMEN STATUS: NORMAL
WBC # BLD AUTO: 2 K/UL (ref 4–11)

## 2024-11-08 PROCEDURE — 86901 BLOOD TYPING SEROLOGIC RH(D): CPT

## 2024-11-08 PROCEDURE — G0378 HOSPITAL OBSERVATION PER HR: HCPCS

## 2024-11-08 PROCEDURE — 86923 COMPATIBILITY TEST ELECTRIC: CPT

## 2024-11-08 PROCEDURE — 85025 COMPLETE CBC W/AUTO DIFF WBC: CPT

## 2024-11-08 PROCEDURE — 86900 BLOOD TYPING SEROLOGIC ABO: CPT

## 2024-11-08 PROCEDURE — 86850 RBC ANTIBODY SCREEN: CPT

## 2024-11-08 PROCEDURE — 80053 COMPREHEN METABOLIC PANEL: CPT

## 2024-11-08 PROCEDURE — 36430 TRANSFUSION BLD/BLD COMPNT: CPT

## 2024-11-08 PROCEDURE — 99285 EMERGENCY DEPT VISIT HI MDM: CPT

## 2024-11-08 PROCEDURE — 6370000000 HC RX 637 (ALT 250 FOR IP): Performed by: INTERNAL MEDICINE

## 2024-11-08 PROCEDURE — P9035 PLATELET PHERES LEUKOREDUCED: HCPCS

## 2024-11-08 PROCEDURE — P9016 RBC LEUKOCYTES REDUCED: HCPCS

## 2024-11-08 RX ORDER — SODIUM CHLORIDE 9 MG/ML
INJECTION, SOLUTION INTRAVENOUS PRN
Status: DISCONTINUED | OUTPATIENT
Start: 2024-11-08 | End: 2024-11-09 | Stop reason: HOSPADM

## 2024-11-08 RX ORDER — SODIUM CHLORIDE 9 MG/ML
INJECTION, SOLUTION INTRAVENOUS CONTINUOUS
Status: ACTIVE | OUTPATIENT
Start: 2024-11-08 | End: 2024-11-09

## 2024-11-08 RX ORDER — POTASSIUM CHLORIDE 1500 MG/1
10 TABLET, EXTENDED RELEASE ORAL EVERY OTHER DAY
Status: DISCONTINUED | OUTPATIENT
Start: 2024-11-09 | End: 2024-11-09 | Stop reason: HOSPADM

## 2024-11-08 RX ORDER — SODIUM CHLORIDE 0.9 % (FLUSH) 0.9 %
5-40 SYRINGE (ML) INJECTION EVERY 12 HOURS SCHEDULED
Status: DISCONTINUED | OUTPATIENT
Start: 2024-11-08 | End: 2024-11-09 | Stop reason: HOSPADM

## 2024-11-08 RX ORDER — POLYETHYLENE GLYCOL 3350 17 G/17G
17 POWDER, FOR SOLUTION ORAL DAILY PRN
Status: DISCONTINUED | OUTPATIENT
Start: 2024-11-08 | End: 2024-11-09 | Stop reason: HOSPADM

## 2024-11-08 RX ORDER — INSULIN LISPRO 100 [IU]/ML
0-8 INJECTION, SOLUTION INTRAVENOUS; SUBCUTANEOUS
Status: DISCONTINUED | OUTPATIENT
Start: 2024-11-08 | End: 2024-11-09 | Stop reason: HOSPADM

## 2024-11-08 RX ORDER — SODIUM CHLORIDE 0.9 % (FLUSH) 0.9 %
5-40 SYRINGE (ML) INJECTION PRN
Status: DISCONTINUED | OUTPATIENT
Start: 2024-11-08 | End: 2024-11-09 | Stop reason: HOSPADM

## 2024-11-08 RX ORDER — DEXTROSE MONOHYDRATE 100 MG/ML
INJECTION, SOLUTION INTRAVENOUS CONTINUOUS PRN
Status: DISCONTINUED | OUTPATIENT
Start: 2024-11-08 | End: 2024-11-09 | Stop reason: HOSPADM

## 2024-11-08 RX ORDER — MONTELUKAST SODIUM 10 MG/1
10 TABLET ORAL NIGHTLY
Status: DISCONTINUED | OUTPATIENT
Start: 2024-11-08 | End: 2024-11-08 | Stop reason: DRUGHIGH

## 2024-11-08 RX ORDER — GLUCAGON 1 MG/ML
1 KIT INJECTION PRN
Status: DISCONTINUED | OUTPATIENT
Start: 2024-11-08 | End: 2024-11-09 | Stop reason: HOSPADM

## 2024-11-08 RX ORDER — ACETAMINOPHEN 325 MG/1
650 TABLET ORAL EVERY 6 HOURS PRN
Status: DISCONTINUED | OUTPATIENT
Start: 2024-11-08 | End: 2024-11-09 | Stop reason: HOSPADM

## 2024-11-08 RX ORDER — ACETAMINOPHEN 650 MG/1
650 SUPPOSITORY RECTAL EVERY 6 HOURS PRN
Status: DISCONTINUED | OUTPATIENT
Start: 2024-11-08 | End: 2024-11-09 | Stop reason: HOSPADM

## 2024-11-08 RX ORDER — INSULIN GLARGINE 100 [IU]/ML
45 INJECTION, SOLUTION SUBCUTANEOUS DAILY
Status: DISCONTINUED | OUTPATIENT
Start: 2024-11-09 | End: 2024-11-09 | Stop reason: HOSPADM

## 2024-11-08 RX ORDER — AMLODIPINE BESYLATE 5 MG/1
5 TABLET ORAL DAILY
Status: DISCONTINUED | OUTPATIENT
Start: 2024-11-09 | End: 2024-11-09 | Stop reason: HOSPADM

## 2024-11-08 RX ORDER — LEVETIRACETAM 500 MG/1
1000 TABLET ORAL 2 TIMES DAILY
Status: DISCONTINUED | OUTPATIENT
Start: 2024-11-08 | End: 2024-11-08 | Stop reason: DRUGHIGH

## 2024-11-08 RX ADMIN — ACETAMINOPHEN 650 MG: 325 TABLET ORAL at 23:55

## 2024-11-08 RX ADMIN — LEVETIRACETAM 750 MG: 500 TABLET, FILM COATED ORAL at 21:13

## 2024-11-08 ASSESSMENT — PAIN - FUNCTIONAL ASSESSMENT: PAIN_FUNCTIONAL_ASSESSMENT: NONE - DENIES PAIN

## 2024-11-08 ASSESSMENT — PAIN SCALES - GENERAL: PAINLEVEL_OUTOF10: 1

## 2024-11-08 ASSESSMENT — LIFESTYLE VARIABLES
HOW MANY STANDARD DRINKS CONTAINING ALCOHOL DO YOU HAVE ON A TYPICAL DAY: PATIENT DOES NOT DRINK
HOW OFTEN DO YOU HAVE A DRINK CONTAINING ALCOHOL: NEVER

## 2024-11-08 ASSESSMENT — PAIN DESCRIPTION - DESCRIPTORS: DESCRIPTORS: DISCOMFORT

## 2024-11-08 ASSESSMENT — PAIN DESCRIPTION - LOCATION: LOCATION: SHOULDER

## 2024-11-08 NOTE — ED NOTES
How does patient ambulate?   [x]Low Fall Risk (ambulates by themselves without support)  []Stand by assist   []Contact Guard   []Front wheel walker  []Wheelchair   []Steady  []Bed bound  []History of Lower Extremity Amputation  []Unknown, did not assess in the emergency department   How does patient take pills?  [x]Whole with Water  []Crushed in applesauce  []Crushed in pudding  []Other  []Unknown no oral medications were given in the ED  Is patient alert?   [x]Alert  []Drowsy but responds to voice  []Doesn't respond to voice but responds to painful stimuli  []Unresponsive  Is patient oriented?   [x]To person  [x]To place  [x]To time  [x]To situation  []Confused  []Agitated  [x]Follows commands  If patient is disoriented or from a Skill Nursing Facility has family been notified of admission?   []Yes   []No  Patient belongings?   []Cell phone  [x]Wallet   []Dentures  [x]Clothing  Any specific patient or family belongings/needs/dynamics?   N/a  Miscellaneous comments/pending orders?  A&O, able to make needs known, ambulates, platelets running. Room air.      If there are any additional questions please reach out to the Emergency Department.   [id number]

## 2024-11-08 NOTE — PROGRESS NOTES
Patient seen in ED, room 25.  Admission completed with the following exceptions:  4 Eyes Assessment, Immunizations, Vaccines, Rights and Responsibilities, Orientation to room, Plan of Care, Education/Learning Assessment and Education Plan, white board, height and weight, pain assessment and head to toe assessment.  Patient is alert and oriented X 4.  Patient lives in a house alone and is being admitted for Pancytopenia.  Home Medication reconciliation will be/has been completed by Pharmacy Staff.  All patient's and/or family's questions answered.     Patient reports she finished chemo and radiation in April for brain cancer

## 2024-11-08 NOTE — ACP (ADVANCE CARE PLANNING)
Advanced Care Planning Note.    Purpose of Encounter: Advanced care planning in light of hospitalization  Parties In Attendance: Patient,    Decisional Capacity: Yes  Subjective: Patient  understand that this conversation is to address long term care goal  Objective: Admitted to the hospital with pancytopenia history of glioma  Goals of Care Determination: Patient would pursue CPR and Intubation if required. N  Code Status: full code  Time spent on Advanced care Plannin minutes  Advanced Care Planning Documents: documented patient's wishes, would like Emiliano Cuenca to make medical decisions if unable to make decisions    Trip Lindsey MD  2024 4:40 PM

## 2024-11-08 NOTE — ED PROVIDER NOTES
called to and read back by Karie Jenkins, 11/08/2024                  15:03, by MiiPharos   COMPREHENSIVE METABOLIC PANEL W/ REFLEX TO MG FOR LOW K - Abnormal; Notable for the following components:    Sodium 129 (*)     Chloride 98 (*)     CO2 20 (*)     Glucose 232 (*)     BUN 24 (*)     Creatinine 1.7 (*)     Est, Glom Filt Rate 32 (*)     All other components within normal limits   EXTRA TUBE, BLOOD BANK   TYPE AND SCREEN   PREPARE RBC (CROSSMATCH)   PREPARE PLATELETS     Previous BUN/crea:    BUN   Date/Time Value Ref Range Status   11/08/2024 12:51 PM 24 (H) 7 - 20 mg/dL Final   08/05/2024 01:40 PM 22 (H) 7 - 20 mg/dL Final   12/24/2023 11:03 AM 21 (H) 7 - 20 mg/dL Final     Creatinine   Date/Time Value Ref Range Status   11/08/2024 12:51 PM 1.7 (H) 0.6 - 1.2 mg/dL Final   08/05/2024 01:40 PM 1.7 (H) 0.6 - 1.2 mg/dL Final   06/28/2024 08:57 AM 1.9 (H) 0.6 - 1.2 mg/dL Final     RADIOLOGY:  None     ED COURSE:    Medications   0.9 % sodium chloride infusion (has no administration in time range)   0.9 % sodium chloride infusion (has no administration in time range)     Transfusion initiated    Vitals:    11/08/24 1238 11/08/24 1349 11/08/24 1406 11/08/24 1516   BP: 131/71  (!) 169/63 (!) 129/58   Pulse: (!) 101 96 99 98   Resp: 16 14 28 22   Temp: 98 °F (36.7 °C)      TempSrc: Oral      SpO2: 100% 100% 100% 100%   Weight: 119.7 kg (263 lb 12.8 oz)      Height: 1.651 m (5' 5\")        History from:  Patient  Limitations to history:  None  Chronic Conditions:  has a past medical history of Cataract, Diabetic retinopathy (HCC), Glioma of brain (HCC), High blood pressure, and Proliferative diabetic retinopathy, right eye (HCC).  Records Reviewed:  Outpatient notes  Disposition Considerations (Tests not ordered but considered, Shared Decision Making, Pt Expectation of Test or Tx.):   imaging not deemed clinically necessary in the ED setting    PROCEDURES:  None    CRITICAL CARE:  Total critical care time of 31 minutes

## 2024-11-08 NOTE — CONSENT
"[facility]  Endocrinology  Progress Note    Admit Date: 1/25/2018     Reason for Consult: Management of T2DM, Hyperglycemia     Surgical Procedure and Date: thrombectomy with TICI 2C reperfusion and stent placement due to L MCA stenosis  PEG 1/31    Home Diabetes Medications: metformin     How often checking glucose at home? FRIEDA, pt aphasic     Diabetes Complications include:   Hyperglycemia    Complicating diabetes co morbidities: Residual deficits from CVA  and LOYDA    HPI:   Patient is a 50 y.o. male with a diagnosis of poorly controlled DM II, HLD, LOYDA, [place] [facility] [place] after noted "strange behavior" for which EMS was called.  Pt was found to have a L M1 occlusion and was taken to IR for thrombectomy with TICI 2C reperfusion and stent placement due to L MCA stenosis. DAPT 2/2 recent stent placement in angiogram after PEG placement.  Endo consulted for BG management.         Interval HPI:   Overnight events: BG reasonably controlled on scheduled Novolog q4hr with TF and Levemir QAM.  Eating:   NPO  Nausea: No  Hypoglycemia and intervention: No  Fever: No  TF: Yes  If yes, type of TF and rate: Diabetasource 75cc/hr    BP (!) 172/97 (BP Location: Right arm, Patient Position: Lying)   Pulse 101   Temp 97.6 °F (36.4 °C) (Axillary)   Resp 18   Ht 5' 10" (1.778 m)   Wt 113 kg (249 lb 1.9 oz)   SpO2 95%   BMI 35.74 kg/m²       Labs Reviewed and Include    No results for input(s): GLU, CALCIUM, ALBUMIN, PROT, NA, K, CO2, CL, BUN, CREATININE, ALKPHOS, ALT, AST, BILITOT in the last 24 hours.  Lab Results   Component Value Date    WBC 5.89 03/18/2018    HGB 13.8 (L) 03/18/2018    HCT 40.5 03/18/2018    MCV 89 03/18/2018     (H) 03/18/2018     No results for input(s): TSH, FREET4 in the last 168 hours.  Lab Results   Component Value Date    HGBA1C 10.0 (H) 01/25/2018       Nutritional status:   Body mass index is 35.74 kg/m².  Lab Results   Component Value Date    ALBUMIN 3.3 (L) " Informed Consent for Blood Component Transfusion Note    I have discussed with the patient the rationale for blood component transfusion; its benefits in treating or preventing fatigue, organ damage, or death; and its risk which includes mild transfusion reactions, rare risk of blood borne infection, or more serious but rare reactions. I have discussed the alternatives to transfusion, including the risk and consequences of not receiving transfusion. The patient had an opportunity to ask questions and had agreed to proceed with transfusion of blood components.    Electronically signed by MARY CEDILLO MD on 11/8/24 at 12:45 PM EST   03/18/2018    ALBUMIN 3.1 (L) 03/16/2018    ALBUMIN 3.0 (L) 03/14/2018     No results found for: PREALBUMIN    Estimated Creatinine Clearance: 142.2 mL/min (based on SCr of 0.8 mg/dL).    Accu-Checks  Recent Labs      03/17/18   2128  03/18/18   0053  03/18/18   0403  03/18/18   0851  03/18/18   1200  03/18/18   1642  03/18/18   2138  03/18/18   2347  03/19/18   0442  03/19/18   0842   POCTGLUCOSE  142*  153*  177*  181*  95  171*  144*  148*  165*  153*       Current Medications and/or Treatments Impacting Glycemic Control  Immunotherapy:    Immunosuppressants     None        Steroids:   Hormones     None        Pressors:    Autonomic Drugs     Start     Stop Route Frequency Ordered    01/31/18 0854  succinylcholine (ANECTINE) 20 mg/mL injection     Comments:  Created by cabinet override    01/31 2059 01/31/18 0854        Hyperglycemia/Diabetes Medications:   Antihyperglycemics     Start     Stop Route Frequency Ordered    03/10/18 0800  insulin aspart U-100 pen 13 Units      -- SubQ Every 24 hours (non-standard times) 03/09/18 0940    03/10/18 0400  insulin aspart U-100 pen 13 Units      -- SubQ Every 24 hours (non-standard times) 03/09/18 0940    03/10/18 0000  insulin aspart U-100 pen 13 Units      -- SubQ Every 24 hours (non-standard times) 03/09/18 0940    03/09/18 2000  insulin aspart U-100 pen 13 Units      -- SubQ Every 24 hours (non-standard times) 03/09/18 0940    03/09/18 1600  insulin aspart U-100 pen 13 Units      -- SubQ Every 24 hours (non-standard times) 03/09/18 0940    03/09/18 1200  insulin aspart U-100 pen 13 Units      -- SubQ Every 24 hours (non-standard times) 03/09/18 0940    03/02/18 0900  insulin detemir U-100 pen 22 Units      -- SubQ Daily 03/02/18 0808    02/24/18 1148  insulin aspart U-100 pen 1-10 Units      -- SubQ Every 4 hours PRN 02/24/18 1049    02/16/18 1400  insulin aspart pen 8 Units      02/19 0001 SubQ Every 4 hours 02/16/18 1058          ASSESSMENT and PLAN    * Embolic  stroke involving left middle cerebral artery    Avoid hypoglycemia             Type 2 diabetes mellitus with hyperglycemia, with long-term current use of insulin    -180\  Continue Levemir 22 units daily  Continue novolog 13 units q4h.    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   Abscess near PEG w/IR aspirate, growing anaerobes   - Rocephin (last dose 3/5) and Flagyl (last dose 3/7)            MATHIEU Larose,ANP-C  Endocrinology  [facility]

## 2024-11-08 NOTE — H&P
HOSPITALISTS HISTORY AND PHYSICAL    11/8/2024 4:39 PM    Patient Information:  CLAU BAEZ is a 74 y.o. female [id number]  PCP:  Lety Snyder APRN - CNP (Tel: [phone] )    Chief complaint:    Chief Complaint   Patient presents with    Abnormal Lab     Patient reports she was sent from her doctor reports she needs a blood transfusion.      History of Present Illness:  Clau Baez is a 74 y.o. female history of glioma on oral maintenance therapy was sent to the hospital from oncology office for low platelets and low hemoglobin patient states has never needed a blood transfusion before denies any episode of bleeding is slightly short of breath and fatigue no chest pain nausea vomiting or sign      REVIEW OF SYSTEMS:   Constitutional: Negative for fever,chills or night sweats  ENT: Negative for rhinorrhea, epistaxis, hoarseness, sore throat.  Cardiovascular: Negative for chest pain, palpitations   Gastrointestinal: Negative for nausea, vomiting, diarrhea  Genitourinary: Negative for polyuria, dysuria   Hematologic/Lymphatic: Negative for bleeding tendency, easy bruising  Musculoskeletal: Negative for myalgias and arthralgias  Neurologic: Negative for confusion,dysarthria.  Skin: Negative for itching,rash  Psychiatric: Negative for depression,anxiety, agitation.  Endocrine: Negative for polydipsia,polyuria,heat /cold intolerance.    Past Medical History:   has a past medical history of Cataract, Diabetic retinopathy (HCC), Glioma of brain (HCC), High blood pressure, and Proliferative diabetic retinopathy, right eye (HCC).     Past Surgical History:   has a past surgical history that includes Carpal tunnel release (1999,2000); Hand surgery (2002, 2008); shoulder surgery (2005); Hysterectomy (Right, 1998); Cataract removal with implant; Finger trigger release; Finger trigger release; Colonoscopy (02/2022);  [facility] Hospitalist Progress Note      Subjective:  62-year-old male admitted with acute on chronic left diabetic foot infection with increased drainage    ROS  14 point review of systems reviewed with the patient otherwise negative except left diabetic foot infection with increased drainage.  Denies chest pain, no shortness of breath, no abdominal pain, no fevers.    Objective    VITAL SIGNS:    Visit Vitals  BP (!) 146/73 (BP Location: RUE - Right upper extremity, Patient Position: Supine)   Pulse 71   Temp 98.8 °F (37.1 °C) (Oral)   Resp 17   Ht 5' 9\" (1.753 m)   Wt 109.5 kg (241 lb 6.5 oz)   SpO2 99%   BMI 35.65 kg/m²        INTAKE/OUTPUT:      Intake/Output Summary (Last 24 hours) at 10/28/2023 1217  Last data filed at 10/28/2023 0400  Gross per 24 hour   Intake 100 ml   Output 2150 ml   Net -2050 ml      General: No acute distress  Eyes: anicteric  HENT: Normocephalic, oral mucosa is moist.  Neck: Supple, non-tender  Respiratory: Lungs clear to auscultation bilaterally  Cardiovascular: regular rate and rhythm, nl s1/s2  Gastrointestinal : Soft, non-tender, non distended, bowel sounds active  Genitourinary: No costovertebral angle tenderness.  Lymphatics: No lymphadenopathy in neck  Extremities: 1-2+ pedal edema bilaterally.  Left foot is wrapped however had increased purulent drainage, foul-smelling.  Chronic lower extremity venous stasis skin changes bilaterally.  Integumentary: Warm, dry, well perfused, intact, no jaundice  Neurologic:aox3.  EOMI.  Moving all extremities  Psychiatric: Cooperative, appropriate mood and affect            Labs  Recent Results (from the past 24 hour(s))   GLUCOSE, BEDSIDE - POINT OF CARE    Collection Time: 10/27/23  3:24 PM   Result Value Ref Range    GLUCOSE, BEDSIDE - POINT OF CARE 107 (H) 70 - 99 mg/dL   GLUCOSE, BEDSIDE - POINT OF CARE    Collection Time: 10/27/23  7:38 PM   Result Value Ref Range    GLUCOSE, BEDSIDE - POINT OF CARE 124 (H) 70 - 99 mg/dL   Basic Metabolic Panel     Collection Time: 10/28/23  6:06 AM   Result Value Ref Range    Fasting Status      Sodium 133 (L) 135 - 145 mmol/L    Potassium 4.2 3.4 - 5.1 mmol/L    Chloride 102 97 - 110 mmol/L    Carbon Dioxide 20 (L) 21 - 32 mmol/L    Anion Gap 15 7 - 19 mmol/L    Glucose 111 (H) 70 - 99 mg/dL    BUN 37 (H) 6 - 20 mg/dL    Creatinine 2.16 (H) 0.67 - 1.17 mg/dL    Glomerular Filtration Rate 34 (L) >=60    BUN/Cr 17 7 - 25    Calcium 8.4 8.4 - 10.2 mg/dL   CBC with Automated Differential (performable only)    Collection Time: 10/28/23  7:48 AM   Result Value Ref Range    WBC 7.1 4.2 - 11.0 K/mcL    RBC 2.54 (L) 4.50 - 5.90 mil/mcL    HGB 6.6 (LL) 13.0 - 17.0 g/dL    HCT 19.5 (L) 39.0 - 51.0 %    MCV 76.8 (L) 78.0 - 100.0 fl    MCH 26.0 26.0 - 34.0 pg    MCHC 33.8 32.0 - 36.5 g/dL    RDW-CV 13.6 11.0 - 15.0 %    RDW-SD 39.2 39.0 - 50.0 fL     140 - 450 K/mcL    NRBC 0 <=0 /100 WBC    Neutrophil, Percent 56 %    Lymphocytes, Percent 23 %    Mono, Percent 15 %    Eosinophils, Percent 5 %    Basophils, Percent 1 %    Immature Granulocytes 0 %    Absolute Neutrophils 4.0 1.8 - 7.7 K/mcL    Absolute Lymphocytes 1.6 1.0 - 4.0 K/mcL    Absolute Monocytes 1.0 (H) 0.3 - 0.9 K/mcL    Absolute Eosinophils  0.4 0.0 - 0.5 K/mcL    Absolute Basophils 0.0 0.0 - 0.3 K/mcL    Absolute Immature Granulocytes 0.0 0.0 - 0.2 K/mcL   Hemoglobin and Hematocrit    Collection Time: 10/28/23 10:45 AM   Result Value Ref Range    HGB 7.2 (L) 13.0 - 17.0 g/dL    HCT 21.1 (L) 39.0 - 51.0 %   GLUCOSE, BEDSIDE - POINT OF CARE    Collection Time: 10/28/23 11:06 AM   Result Value Ref Range    GLUCOSE, BEDSIDE - POINT OF CARE 118 (H) 70 - 99 mg/dL       Microbiology Results     None             [unfilled]        Assessment and Plan    -Acute on chronic left diabetic foot infection causing SIRS present on admission with increased purulent drainage, possible osteomyelitis: Infectious disease, podiatry consulted.  Continue IV antibiotics cefepime, vancomycin.   Monitor.     Type 2 diabetes with CKD 3: Continue Lantus, ISS, Accu-Cheks.  Monitor.     Hypertensive heart disease with CKD 3: Stable.  Continue current medications.     CKD 3: Monitor.  Avoid nephrotoxic medications.  ARB held.  Chronic lower extremity venous stasis: Continue torsemide.     Chronic, stable diastolic CHF: Continue torsemide.  No shortness of breath.     Chronic PAD: Continue aspirin, statin.     Prophylaxis: Subcu heparin  CODE STATUS full code         Sander Degroot MD  10/28/2023     11/08/2024 12:51 PM    CALCIUM 10.3 11/08/2024 12:51 PM    GFRAA 59 08/26/2022 09:10 AM    LABGLOM 32 11/08/2024 12:51 PM    LABGLOM 54 12/24/2023 11:03 AM    GLUCOSE 232 11/08/2024 12:51 PM     No orders to display       Recent imaging reviewed    Problem List  Principal Problem:    Pancytopenia (HCC)  Resolved Problems:    * No resolved hospital problems. *        Assessment/Plan:   Pancytopenia secondary to glioma  Will get heme-onc consult give unit of platelets and give unit PRBCs monitor for any bleeding check CBC in a.m. telemetry    Hyponatremia ivf repeat in am  Hold hctz    Dm2 ssi    Ckd3 montior    DVT prophylaxis scds  Code status full code        Admit as inpatient I anticipate hospitalization spanning more than two midnights for investigation and treatment of the above medically necessary diagnoses.    Please note that some part of this chart was generated using Dragon dictation software. Although every effort was made to ensure the accuracy of this automated transcription, some errors in transcription may have occurred inadvertently. If you may need any clarification, please do not hesitate to contact me through EPIC.       Trip Lindsey MD    11/8/2024 4:39 PM

## 2024-11-09 VITALS
DIASTOLIC BLOOD PRESSURE: 68 MMHG | HEART RATE: 82 BPM | SYSTOLIC BLOOD PRESSURE: 109 MMHG | WEIGHT: 263 LBS | TEMPERATURE: 98.2 F | RESPIRATION RATE: 17 BRPM | OXYGEN SATURATION: 98 % | BODY MASS INDEX: 43.82 KG/M2 | HEIGHT: 65 IN

## 2024-11-09 LAB
ANION GAP SERPL CALCULATED.3IONS-SCNC: 12 MMOL/L (ref 3–16)
BASOPHILS # BLD: 0 K/UL (ref 0–0.2)
BASOPHILS NFR BLD: 0.1 %
BLOOD BANK DISPENSE STATUS: NORMAL
BLOOD BANK DISPENSE STATUS: NORMAL
BLOOD BANK PRODUCT CODE: NORMAL
BLOOD BANK PRODUCT CODE: NORMAL
BPU ID: NORMAL
BPU ID: NORMAL
BUN SERPL-MCNC: 24 MG/DL (ref 7–20)
CALCIUM SERPL-MCNC: 10 MG/DL (ref 8.3–10.6)
CHLORIDE SERPL-SCNC: 107 MMOL/L (ref 99–110)
CO2 SERPL-SCNC: 19 MMOL/L (ref 21–32)
CREAT SERPL-MCNC: 1.6 MG/DL (ref 0.6–1.2)
DEPRECATED RDW RBC AUTO: 17.6 % (ref 12.4–15.4)
DEPRECATED RDW RBC AUTO: 18.9 % (ref 12.4–15.4)
DESCRIPTION BLOOD BANK: NORMAL
DESCRIPTION BLOOD BANK: NORMAL
EOSINOPHIL # BLD: 0 K/UL (ref 0–0.6)
EOSINOPHIL NFR BLD: 0.2 %
FERRITIN SERPL IA-MCNC: 507 NG/ML (ref 15–150)
FOLATE SERPL-MCNC: 5.16 NG/ML (ref 4.78–24.2)
GFR SERPLBLD CREATININE-BSD FMLA CKD-EPI: 34 ML/MIN/{1.73_M2}
GLUCOSE BLD-MCNC: 214 MG/DL (ref 70–99)
GLUCOSE BLD-MCNC: 248 MG/DL (ref 70–99)
GLUCOSE SERPL-MCNC: 223 MG/DL (ref 70–99)
HAPTOGLOB SERPL-MCNC: 172 MG/DL (ref 30–200)
HCT VFR BLD AUTO: 18.5 % (ref 36–48)
HCT VFR BLD AUTO: 25.5 % (ref 36–48)
HGB BLD-MCNC: 6.4 G/DL (ref 12–16)
HGB BLD-MCNC: 8.5 G/DL (ref 12–16)
IRON SATN MFR SERPL: 56 % (ref 15–50)
IRON SERPL-MCNC: 113 UG/DL (ref 37–145)
LDH SERPL L TO P-CCNC: 181 U/L (ref 100–190)
LYMPHOCYTES # BLD: 0.3 K/UL (ref 1–5.1)
LYMPHOCYTES NFR BLD: 21.4 %
MCH RBC QN AUTO: 32.7 PG (ref 26–34)
MCH RBC QN AUTO: 33.5 PG (ref 26–34)
MCHC RBC AUTO-ENTMCNC: 33.2 G/DL (ref 31–36)
MCHC RBC AUTO-ENTMCNC: 34.5 G/DL (ref 31–36)
MCV RBC AUTO: 97.1 FL (ref 80–100)
MCV RBC AUTO: 98.5 FL (ref 80–100)
MONOCYTES # BLD: 0.3 K/UL (ref 0–1.3)
MONOCYTES NFR BLD: 19.7 %
NEUTROPHILS # BLD: 0.8 K/UL (ref 1.7–7.7)
NEUTROPHILS NFR BLD: 58.6 %
PERFORMED ON: ABNORMAL
PERFORMED ON: ABNORMAL
PLATELET # BLD AUTO: 45 K/UL (ref 135–450)
PLATELET # BLD AUTO: 45 K/UL (ref 135–450)
PMV BLD AUTO: 7 FL (ref 5–10.5)
PMV BLD AUTO: 8.2 FL (ref 5–10.5)
POTASSIUM SERPL-SCNC: 5.2 MMOL/L (ref 3.5–5.1)
RBC # BLD AUTO: 1.91 M/UL (ref 4–5.2)
RBC # BLD AUTO: 2.59 M/UL (ref 4–5.2)
SODIUM SERPL-SCNC: 138 MMOL/L (ref 136–145)
TIBC SERPL-MCNC: 203 UG/DL (ref 260–445)
VIT B12 SERPL-MCNC: 775 PG/ML (ref 211–911)
WBC # BLD AUTO: 1.4 K/UL (ref 4–11)
WBC # BLD AUTO: 1.6 K/UL (ref 4–11)

## 2024-11-09 PROCEDURE — 83550 IRON BINDING TEST: CPT

## 2024-11-09 PROCEDURE — G0378 HOSPITAL OBSERVATION PER HR: HCPCS

## 2024-11-09 PROCEDURE — 85025 COMPLETE CBC W/AUTO DIFF WBC: CPT

## 2024-11-09 PROCEDURE — 80048 BASIC METABOLIC PNL TOTAL CA: CPT

## 2024-11-09 PROCEDURE — 36430 TRANSFUSION BLD/BLD COMPNT: CPT

## 2024-11-09 PROCEDURE — 82746 ASSAY OF FOLIC ACID SERUM: CPT

## 2024-11-09 PROCEDURE — 2580000003 HC RX 258: Performed by: INTERNAL MEDICINE

## 2024-11-09 PROCEDURE — 6370000000 HC RX 637 (ALT 250 FOR IP): Performed by: INTERNAL MEDICINE

## 2024-11-09 PROCEDURE — 85027 COMPLETE CBC AUTOMATED: CPT

## 2024-11-09 PROCEDURE — 83010 ASSAY OF HAPTOGLOBIN QUANT: CPT

## 2024-11-09 PROCEDURE — 96360 HYDRATION IV INFUSION INIT: CPT

## 2024-11-09 PROCEDURE — 82607 VITAMIN B-12: CPT

## 2024-11-09 PROCEDURE — 83615 LACTATE (LD) (LDH) ENZYME: CPT

## 2024-11-09 PROCEDURE — 96361 HYDRATE IV INFUSION ADD-ON: CPT

## 2024-11-09 PROCEDURE — 83540 ASSAY OF IRON: CPT

## 2024-11-09 PROCEDURE — 36415 COLL VENOUS BLD VENIPUNCTURE: CPT

## 2024-11-09 PROCEDURE — 82728 ASSAY OF FERRITIN: CPT

## 2024-11-09 RX ORDER — SODIUM CHLORIDE 9 MG/ML
INJECTION, SOLUTION INTRAVENOUS PRN
Status: DISCONTINUED | OUTPATIENT
Start: 2024-11-09 | End: 2024-11-09 | Stop reason: HOSPADM

## 2024-11-09 RX ADMIN — POTASSIUM CHLORIDE 10 MEQ: 1500 TABLET, EXTENDED RELEASE ORAL at 08:56

## 2024-11-09 RX ADMIN — SODIUM CHLORIDE: 9 INJECTION, SOLUTION INTRAVENOUS at 03:38

## 2024-11-09 RX ADMIN — SODIUM CHLORIDE, PRESERVATIVE FREE 10 ML: 5 INJECTION INTRAVENOUS at 08:57

## 2024-11-09 RX ADMIN — AMLODIPINE BESYLATE 5 MG: 5 TABLET ORAL at 08:56

## 2024-11-09 RX ADMIN — INSULIN LISPRO 2 UNITS: 100 INJECTION, SOLUTION INTRAVENOUS; SUBCUTANEOUS at 08:55

## 2024-11-09 RX ADMIN — INSULIN LISPRO 2 UNITS: 100 INJECTION, SOLUTION INTRAVENOUS; SUBCUTANEOUS at 12:26

## 2024-11-09 RX ADMIN — LEVETIRACETAM 750 MG: 500 TABLET, FILM COATED ORAL at 08:57

## 2024-11-09 RX ADMIN — INSULIN GLARGINE 45 UNITS: 100 INJECTION, SOLUTION SUBCUTANEOUS at 08:57

## 2024-11-09 ASSESSMENT — PAIN SCALES - GENERAL
PAINLEVEL_OUTOF10: 0
PAINLEVEL_OUTOF10: 1

## 2024-11-09 NOTE — PLAN OF CARE
Problem: Chronic Conditions and Co-morbidities  Goal: Patient's chronic conditions and co-morbidity symptoms are monitored and maintained or improved  Outcome: Progressing  Flowsheets (Taken 11/9/2024 0026)  Care Plan - Patient's Chronic Conditions and Co-Morbidity Symptoms are Monitored and Maintained or Improved: Monitor and assess patient's chronic conditions and comorbid symptoms for stability, deterioration, or improvement     Problem: Discharge Planning  Goal: Discharge to home or other facility with appropriate resources  Outcome: Progressing  Flowsheets (Taken 11/9/2024 0026)  Discharge to home or other facility with appropriate resources: Identify discharge learning needs (meds, wound care, etc)     Problem: Cardiovascular - Adult  Goal: Absence of cardiac dysrhythmias or at baseline  Outcome: Progressing  Flowsheets (Taken 11/9/2024 0026)  Absence of cardiac dysrhythmias or at baseline: Monitor cardiac rate and rhythm     Problem: Hematologic - Adult  Goal: Maintains hematologic stability  Outcome: Progressing  Flowsheets (Taken 11/9/2024 0026)  Maintains hematologic stability: Administer blood products/factors as ordered

## 2024-11-09 NOTE — DISCHARGE SUMMARY
Hospital Medicine Discharge Summary    Patient: Dania Baez     Gender: female  : 1950   Age: 74 y.o.  MRN: [id number]    Admitting Physician: Trip Lindsey MD  Discharge Physician: Trip Lindsey MD     Code Status: Full Code     Admit Date: 2024   Discharge Date:   2024    Disposition:  Home  Time spent arranging discharge: 31 minutes    Discharge Diagnoses:    Active Hospital Problems    Diagnosis Date Noted    Pancytopenia (HCC) [D61.818] 2024         Condition at Discharge:  Stable    Hospital Course:   Patient admitted to the hospital with planned cycle murray was given 1 unit of platelets and 1 2 units of PRBCs patient stabilized and includes cleared for discharge by oncology with follow-up with oncology on Monday    Discharge Exam:    /68   Pulse 82   Temp 98.2 °F (36.8 °C) (Oral)   Resp 17   Ht 1.651 m (5' 5\")   Wt 119.3 kg (263 lb)   SpO2 98%   BMI 43.77 kg/m²   General appearance:  Appears comfortable. AAOx3  HEENT: atraumatic, Pupils equal, muscous membranes moist, no masses appreciated  Cardiovascular: Regular rate and rhythm no murmurs appreciated  Respiratory: CTAB no wheezing  Gastrointestinal: Abdomen soft, non-tender, BS+  EXT: no edema  Neurology: no gross focal deficts  Psychiatry: Appropriate affect. Not agitated  Skin: Warm, dry, no rashes appreciated    Discharge Medications:   Current Discharge Medication List        Current Discharge Medication List        Current Discharge Medication List        CONTINUE these medications which have NOT CHANGED    Details   amLODIPine (NORVASC) 5 MG tablet TAKE 1 TABLET BY MOUTH DAILY  Qty: 100 tablet, Refills: 2    Comments: Please send a replace/new response with 100-Day Supply if appropriate to maximize member benefit. Requesting 1 year supply.  Associated Diagnoses: Essential hypertension      telmisartan (MICARDIS) 80 MG tablet TAKE 1 TABLET BY MOUTH DAILY  Qty: 100 tablet, Refills: 2    Comments:  retinopathy of right eye, with long-term current use of insulin ([facility])      ACCU-CHEK GUIDE strip USE 1 STRIP TO CHECK GLUCOSE 4 TIMES DAILY  Qty: 400 each, Refills: 1    Associated Diagnoses: Type 2 diabetes mellitus without complication, with long-term current use of insulin ([facility])      Continuous Blood Gluc Transmit (DEXCOM G6 TRANSMITTER) MISC Use 3-4 times daily to check glucose continually.  Qty: 1 each, Refills: 0    Associated Diagnoses: Type 2 diabetes mellitus with stable proliferative retinopathy of right eye, with long-term current use of insulin ([facility])      !! Continuous Blood Gluc  (DEXCOM G6 ) SHYANNE Use 3-4 times daily to check glucose continually  Qty: 1 each, Refills: 0    Associated Diagnoses: Type 2 diabetes mellitus with stable proliferative retinopathy of right eye, with long-term current use of insulin ([facility])      Continuous Blood Gluc Sensor (DEXCOM G6 SENSOR) MISC Use 3-4 times daily to check glucose continually  Qty: 6 each, Refills: 1    Associated Diagnoses: Type 2 diabetes mellitus with stable proliferative retinopathy of right eye, with long-term current use of insulin ([facility])      Insulin Syringe-Needle U-100 31G X 5/16\" 1 ML MISC Use with both BID Humulin N and TID Humulin R injections = 5 times daily.  Qty: 450 each, Refills: 1    Associated Diagnoses: Type 2 diabetes mellitus without complication, with long-term current use of insulin ([facility])       !! - Potential duplicate medications found. Please discuss with provider.        Current Discharge Medication List        STOP taking these medications       montelukast (SINGULAIR) 10 MG tablet Comments:   Reason for Stopping:               Labs: For convenience and continuity at follow-up the following most recent labs are provided:    Lab Results   Component Value Date/Time    WBC 1.6 11/09/2024 03:27 AM    HGB 6.4 11/09/2024 03:27 AM    HCT 18.5 11/09/2024 03:27 AM    MCV 97.1 11/09/2024 03:27 AM    PLT 45 11/09/2024 03:27 AM

## 2024-11-09 NOTE — FLOWSHEET NOTE
4 Eyes Skin Assessment     NAME:  Dania Baez  YOB: 1950  MEDICAL RECORD NUMBER:  [id number]    The patient is being assessed for  Admission    I agree that at least one RN has performed a thorough Head to Toe Skin Assessment on the patient. ALL assessment sites listed below have been assessed.      Areas assessed by both nurses:    Head, Face, Ears, Shoulders, Back, Chest, Arms, Elbows, Hands, Sacrum. Buttock, Coccyx, Ischium, and Legs. Feet and Heels        Does the Patient have a Wound? No noted wound(s)       Jose Prevention initiated by RN: No  Wound Care Orders initiated by RN: No    Pressure Injury (Stage 3,4, Unstageable, DTI, NWPT, and Complex wounds) if present, place Wound referral order by RN under : No    New Ostomies, if present place, Ostomy referral order under : No     Nurse 1 eSignature: Electronically signed by Ramesh Recinos RN on 11/8/24 at 11:45 PM EST    **SHARE this note so that the co-signing nurse can place an eSignature**    Nurse 2 eSignature: Electronically signed by Dimitris Zelaya on 11/8/24 at 11:47 PM EST

## 2024-11-09 NOTE — PROGRESS NOTES
Shift assessment completed, see flowsheets.  Medications administered, see MAR. Vital signs stable.  Plan of care discussed with patient. Safety precautions in place. Call light and bedside table within reach.  Pt denies further needs at this time.  Will continue to monitor.  Earlene Campos RN

## 2024-11-09 NOTE — FLOWSHEET NOTE
Discharge instructions and paperwork given to patient. Patient verbalized understanding and denied further questions. IV removed. All belongings sent with patient. Patient to ER parking for discharge.

## 2024-11-09 NOTE — CONSULTS
[facility]    Consult Note      Requesting Physician:  nicole    CHIEF COMPLAINT:  low counts       HISTORY OF PRESENT ILLNESS:    Ms. Baez  is a 74 y.o. female we are seeing in consultation for known glioma -pt of dr liu who is on temodar maintenance monthly  His last office note-parts of it copied below  She was sent to er given she had plts in the 16 range and hgb under 7  She took her temodar about 2.5 weeks ago or so   She has had some low counts periodically -plts in the 40-70 range and is anemic at baseline in the 8ish range  She has never got this low  She had one unit of prbc and plts -and hgb dropped but plts improved   She is getting another unit of blood   Ms is normal no abd pain fevers or neuro symptoms   No diet restrictions   Pt was sent to er for transfusion I believe but got admitted  She feels ok   Denies bleeding   She is not on any bactrim or acyclovir prophylaxis               Chief Complaint  Glioma brain tumor ( Date of Dx:12/22/2023 Extent of Disease: Adjuvant; Disease State: Initial diagnosis; Histologic Grade: G4; Location: Temporal lobe, left; )        Problem List  Glioma brain tumor ( Date of Dx:12/22/2023 Extent of Disease: Adjuvant; Disease State: Initial diagnosis; Histologic Grade: G4; Location: Temporal lobe, left; )  Cough  Neutropenia  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 Baez  is a 73 y.o. female Presented with a 1 week history of altered mental status and aphasia in mid December, 2023     MRI brain (12/21/2023) revealed a diffuse expansile T2 signal abnormality in the left temporal lobe extending posteriorly to the left occipital lobe and extending along the ventricle.  This extends medially to involve the medial temporal lobe as well as the left thalamus.  There is no T1c enhancing disease.     Of note, she was seen In the          Allergies:  Allergies   Allergen Reactions    Cephalexin Itching     Can take Amoxil    Cephalexin Itching    Lipitor [Atorvastatin] Myalgia    Losartan Other (See Comments)     lightheaded       Social History:  Social History     Socioeconomic History    Marital status: Single     Spouse name: Not on file    Number of children: 2    Years of education: 12    Highest education level: Not on file   Occupational History    Not on file   Tobacco Use    Smoking status: Former     Current packs/day: 0.00     Average packs/day: 0.5 packs/day for 20.0 years (10.0 ttl pk-yrs)     Types: Cigarettes     Start date: 3/1/1976     Quit date: 3/1/1996     Years since quittin.7    Smokeless tobacco: Never   Vaping Use    Vaping status: Never Used   Substance and Sexual Activity    Alcohol use: No    Drug use: Never    Sexual activity: Not Currently     Partners: Male   Other Topics Concern    Not on file   Social History Narrative    Not on file     Social Determinants of Health     Financial Resource Strain: Low Risk  (4/3/2024)    Overall Financial Resource Strain (CARDIA)     Difficulty of Paying Living Expenses: Not hard at all   Food Insecurity: Food Insecurity Present (2024)    Hunger Vital Sign     Worried About Running Out of Food in the Last Year: Sometimes true     Ran Out of Food in the Last Year: Sometimes true   Transportation Needs: No Transportation Needs (2024)    PRAPARE - Transportation     Lack of Transportation (Medical): No     Lack of Transportation (Non-Medical): No   Physical Activity: Unknown (8/3/2024)    Exercise Vital Sign     Days of Exercise per Week: 0 days     Minutes of Exercise per Session: Not on file   Stress: Not on file   Social Connections: Not on file   Intimate Partner Violence: Unknown (2024)    [facility] from [facility], [facility]    Interpersonal Safety     Feel physically or emotionally unsafe where

## 2024-11-11 ENCOUNTER — CARE COORDINATION (OUTPATIENT)
Dept: CASE MANAGEMENT | Age: 74
End: 2024-11-11

## 2024-11-11 DIAGNOSIS — D61.818 PANCYTOPENIA (HCC): Primary | ICD-10-CM

## 2024-11-11 LAB
PATH INTERP BLD-IMP: NORMAL
PATH INTERP BLD-IMP: NORMAL

## 2024-11-11 PROCEDURE — 1111F DSCHRG MED/CURRENT MED MERGE: CPT | Performed by: NURSE PRACTITIONER

## 2024-11-11 NOTE — CARE COORDINATION
Care Transitions Note    Initial Call - Call within 2 business days of discharge: Yes    Patient Current Location:  Home: [address] [place] [address] [place] [address]    Care Transition Nurse contacted the patient by telephone to perform post hospital discharge assessment, verified name and  as identifiers. Provided introduction to self, and explanation of the Care Transition Nurse role.   pancytopenia  Patient: Dania Baez    Patient : 1950   MRN: [id number]    Reason for Admission: pancytopenia  Discharge Date: 24  RURS: Readmission Risk Score: 13.5      Last Discharge Facility       Date Complaint Diagnosis Description Type Department Provider    24 Abnormal Lab Anemia, unspecified type ... ED to Hosp-Admission (Discharged) (ADMITTED) [facility] 3[facility] Trip Lindsey MD; Herlinda Peters..   .           Was this an external facility discharge? No    Additional needs identified to be addressed with provider   No needs identified             Method of communication with provider: none.    Patients top risk factors for readmission: medical condition-.    Interventions to address risk factors:   Education: .  Review of patient management of conditions/medications: .    Care Summary Note: Patient reports that she is feeling better and much less fatigued.  Discussed discharge instructions and reviewed medications, 1111F completed.  She was not prescribed any new medications and is taking all of medications as prescribed.  She is declining to schedule a hospital follow up, stating \"this is an [facility] matter not something for my PCP\".  CTN will route message to PCP.  Patient is following up with [facility] tomorrow for blood work.  She denies any questions or concerns at this time.  CTN will continue with outreach follow up calls.      Care Transition Nurse reviewed discharge instructions, medical action plan, and red flags with patient. The patient was given an opportunity to ask questions; all questions answered at this

## 2024-11-13 ENCOUNTER — CARE COORDINATION (OUTPATIENT)
Dept: CASE MANAGEMENT | Age: 74
End: 2024-11-13

## 2024-11-13 NOTE — CARE COORDINATION
[facility], [place], Emergency Department    [address]    [facility] [place] [place] [address]    Phone:  [phone]    Fax:  [phone]                                       Dante Pearson   MRN: [id number]    Department:  [facility], Emergency Department   Date of Visit:  11/2/2017           After Visit Summary Signature Page     I have received my discharge instructions, and my questions have been answered. I have discussed any challenges I see with this plan with the nurse or doctor.    ..........................................................................................................................................  Patient/Patient Representative Signature      ..........................................................................................................................................  Patient Representative Print Name and Relationship to Patient    ..................................................               ................................................  Date                                            Time    ..........................................................................................................................................  Reviewed by Signature/Title    ...................................................              ..............................................  Date                                                            Time           Patient does not have a PCP follow up.  CTN will route a follow up message to PCP office &  requesting outreach to schedule.

## 2024-11-15 ENCOUNTER — CARE COORDINATION (OUTPATIENT)
Dept: CASE MANAGEMENT | Age: 74
End: 2024-11-15

## 2024-11-18 ENCOUNTER — CARE COORDINATION (OUTPATIENT)
Dept: CASE MANAGEMENT | Age: 74
End: 2024-11-18

## 2024-11-20 ENCOUNTER — HOSPITAL ENCOUNTER (EMERGENCY)
Age: 74
Discharge: HOME | End: 2024-11-20
Payer: MEDICARE

## 2024-11-20 ENCOUNTER — CARE COORDINATION (OUTPATIENT)
Dept: CASE MANAGEMENT | Age: 74
End: 2024-11-20

## 2024-11-20 VITALS
DIASTOLIC BLOOD PRESSURE: 73 MMHG | SYSTOLIC BLOOD PRESSURE: 123 MMHG | TEMPERATURE: 97.6 F | WEIGHT: 266.6 LBS | RESPIRATION RATE: 14 BRPM | OXYGEN SATURATION: 100 % | HEIGHT: 65 IN | BODY MASS INDEX: 44.42 KG/M2 | HEART RATE: 98 BPM

## 2024-11-20 DIAGNOSIS — E16.1 NOCTURNAL HYPOGLYCEMIA: Primary | ICD-10-CM

## 2024-11-20 DIAGNOSIS — D61.818 PANCYTOPENIA (HCC): ICD-10-CM

## 2024-11-20 LAB
ALBUMIN SERPL-MCNC: 3.9 G/DL (ref 3.4–5)
ALBUMIN/GLOB SERPL: 1.2 {RATIO} (ref 1.1–2.2)
ALP SERPL-CCNC: 69 U/L (ref 40–129)
ALT SERPL-CCNC: 8 U/L (ref 10–40)
ANION GAP SERPL CALCULATED.3IONS-SCNC: 9 MMOL/L (ref 3–16)
AST SERPL-CCNC: 17 U/L (ref 15–37)
BASOPHILS # BLD: 0 K/UL (ref 0–0.2)
BASOPHILS NFR BLD: 0.1 %
BILIRUB SERPL-MCNC: 0.4 MG/DL (ref 0–1)
BUN SERPL-MCNC: 26 MG/DL (ref 7–20)
CALCIUM SERPL-MCNC: 9.9 MG/DL (ref 8.3–10.6)
CHLORIDE SERPL-SCNC: 105 MMOL/L (ref 99–110)
CO2 SERPL-SCNC: 22 MMOL/L (ref 21–32)
CREAT SERPL-MCNC: 1.4 MG/DL (ref 0.6–1.2)
DEPRECATED RDW RBC AUTO: 18.6 % (ref 12.4–15.4)
EOSINOPHIL # BLD: 0 K/UL (ref 0–0.6)
EOSINOPHIL NFR BLD: 1.3 %
GFR SERPLBLD CREATININE-BSD FMLA CKD-EPI: 40 ML/MIN/{1.73_M2}
GLUCOSE BLD-MCNC: 186 MG/DL (ref 70–99)
GLUCOSE SERPL-MCNC: 142 MG/DL (ref 70–99)
HCT VFR BLD AUTO: 23.8 % (ref 36–48)
HGB BLD-MCNC: 8.1 G/DL (ref 12–16)
LYMPHOCYTES # BLD: 0.5 K/UL (ref 1–5.1)
LYMPHOCYTES NFR BLD: 22.9 %
MCH RBC QN AUTO: 33.4 PG (ref 26–34)
MCHC RBC AUTO-ENTMCNC: 34.1 G/DL (ref 31–36)
MCV RBC AUTO: 98.1 FL (ref 80–100)
MONOCYTES # BLD: 0.4 K/UL (ref 0–1.3)
MONOCYTES NFR BLD: 16.9 %
NEUTROPHILS # BLD: 1.3 K/UL (ref 1.7–7.7)
NEUTROPHILS NFR BLD: 58.8 %
PERFORMED ON: ABNORMAL
PLATELET # BLD AUTO: 28 K/UL (ref 135–450)
PMV BLD AUTO: 8 FL (ref 5–10.5)
POTASSIUM SERPL-SCNC: 4.6 MMOL/L (ref 3.5–5.1)
PROT SERPL-MCNC: 7.2 G/DL (ref 6.4–8.2)
RBC # BLD AUTO: 2.43 M/UL (ref 4–5.2)
SODIUM SERPL-SCNC: 136 MMOL/L (ref 136–145)
WBC # BLD AUTO: 2.2 K/UL (ref 4–11)

## 2024-11-20 PROCEDURE — 99283 EMERGENCY DEPT VISIT LOW MDM: CPT

## 2024-11-20 PROCEDURE — 85025 COMPLETE CBC W/AUTO DIFF WBC: CPT

## 2024-11-20 PROCEDURE — 80053 COMPREHEN METABOLIC PANEL: CPT

## 2024-11-20 ASSESSMENT — PAIN - FUNCTIONAL ASSESSMENT: PAIN_FUNCTIONAL_ASSESSMENT: 0-10

## 2024-11-20 ASSESSMENT — PAIN SCALES - GENERAL: PAINLEVEL_OUTOF10: 0

## 2024-11-20 NOTE — ED NOTES
Pt reports eating, candy and sugary ceral to bring blood sugar, discussed eating more protien, peanut butter crackers, nuts, ect to help with and maintain her blood sugar

## 2024-11-20 NOTE — ED PROVIDER NOTES
diabetic retinopathy, right eye (HCC).    CONSULTS: (Who and What was discussed)  IP CONSULT TO HEM/ONC      Social Determinants : Patient has significant healthcare illiteracy    Records Reviewed (Source):     CC/HPI Summary, DDx, ED Course, and Reassessment:   Dania Baez is a 74 y.o. female with past medical history of diabetes, glioblastoma who presents with hypoglycemia.  Patient reports for the last 6 or 7 months she has been having low blood sugars at night.  She has an monitor and gets alarms when is under 80, states she has to wake up and drink juice.  Last night she said it happened about 5 or 6 times.  The last time she took insulin was yesterday morning around 11 AM, took 40 units of insulin and.  She states she does not take her long-acting insulin anymore because it causes her to drop too low.  She usually eats dinner around 4 5 PM and goes to bed \"whenever\".  She called primary care today and they directed her to the ER, unsure why.  She has an endocrinology appointment in February.  She denies recent illness, fever, chest pain, headache, bleeding, dizziness, syncope.  She also takes metformin, denies any other oral diabetes medications.    Of note, patient was admitted 11/8 through 11/9 this month for pancytopenia, required 2 units PRBC and 1 unit of platelets.  She takes oral maintenance chemo for her glioblastoma.    I initially attempted to discuss lowering her one-time morning insulin dose and patient was not receptive to this at all because she states her blood sugar will get close to 200 if she does not.  Also discussed eating a snack or food before bedtime and when she wakes up with low hypoglycemia.  Patient states this is not working in the past and is also not receptive to this education.  Patient requesting lab work today, will order.  Blood glucose check 130 here.  She did not take any insulin today.      Patient with stable pancytopenia, platelets 28.  I consulted her oncologist,

## 2024-11-21 ENCOUNTER — CARE COORDINATION (OUTPATIENT)
Dept: CASE MANAGEMENT | Age: 74
End: 2024-11-21

## 2024-11-21 NOTE — CARE COORDINATION
Care Transitions Note    Follow Up Call     Patient Current Location:  Home: [address] [place] [address] [place] [address]    Care Transition Nurse contacted the patient by telephone. Verified name and  as identifiers.    Additional needs identified to be addressed with provider   No needs identified                 Method of communication with provider: none.    Care Summary Note: Patient reports that she did visit ED yesterday, is feeling much better today.  She was having trouble with her BS dropping throughout the night and she reports that last night and today it is much more stabilized. Her FBS this morning was 94.  She denies any medication changes from ED and reports that she does have an endocrinologist appointment 24.  CTN encouraged her to call PCP  with any questions or concerns.  She verbalized understanding.  CTN will continue with outreach follow up calls.    Plan of care updates since last contact:  Education: .  Review of patient management of conditions/medications: .       Advance Care Planning:   Does patient have an Advance Directive: reviewed during previous call, see note. .    Medication Review:  No changes since last call.     Remote Patient Monitoring:  Offered patient enrollment in the Remote Patient Monitoring (RPM) program for in-home monitoring: Deferred at this time because .; will discuss at next outreach.    DM, HTN    Assessments:  Care Transitions Subsequent and Final Call    Subsequent and Final Calls  Do you have any ongoing symptoms?: No  Have your medications changed?: No  Do you currently have any active services?: No  Do you have any needs or concerns that I can assist you with?: No  Identified Barriers: None  Care Transitions Interventions  Other Interventions:              Follow Up Appointment:   Reviewed upcoming appointment(s).  Future Appointments         Provider Specialty Dept Phone    2025 12:40 PM Lindsay Stone MD Endocrinology [phone]            Care 
no

## 2024-11-25 ENCOUNTER — CARE COORDINATION (OUTPATIENT)
Dept: CASE MANAGEMENT | Age: 74
End: 2024-11-25

## 2024-11-25 NOTE — CARE COORDINATION
Care Transitions Note    Follow Up Call     Attempted to reach patient for transitions of care follow up.  Unable to reach patient.      Outreach Attempts:   HIPAA compliant voicemail left for patient.     Care Summary Note:     Follow Up Appointment:   Future Appointments         Provider Specialty Dept Phone    2/5/2025 12:40 PM Lindsay Stone MD Endocrinology [phone]            Plan for follow-up call in 2-5 days based on severity of symptoms and risk factors. Plan for next call: self management-     Janak Ramirez RN

## 2024-12-02 ENCOUNTER — CARE COORDINATION (OUTPATIENT)
Dept: CASE MANAGEMENT | Age: 74
End: 2024-12-02

## 2024-12-13 ENCOUNTER — HOSPITAL ENCOUNTER (OUTPATIENT)
Dept: MRI IMAGING | Age: 74
Discharge: HOME | End: 2024-12-13
Attending: STUDENT IN AN ORGANIZED HEALTH CARE EDUCATION/TRAINING PROGRAM
Payer: MEDICARE

## 2024-12-13 DIAGNOSIS — C71.2 GLIOBLASTOMA OF TEMPORAL LOBE (HCC): ICD-10-CM

## 2024-12-13 PROCEDURE — A9577 INJ MULTIHANCE: HCPCS | Performed by: STUDENT IN AN ORGANIZED HEALTH CARE EDUCATION/TRAINING PROGRAM

## 2024-12-13 PROCEDURE — 6360000004 HC RX CONTRAST MEDICATION: Performed by: STUDENT IN AN ORGANIZED HEALTH CARE EDUCATION/TRAINING PROGRAM

## 2024-12-13 PROCEDURE — 70553 MRI BRAIN STEM W/O & W/DYE: CPT

## 2024-12-13 RX ADMIN — GADOBENATE DIMEGLUMINE 20 ML: 529 INJECTION, SOLUTION INTRAVENOUS at 13:22

## 2024-12-23 ENCOUNTER — TELEPHONE (OUTPATIENT)
Dept: FAMILY MEDICINE CLINIC | Age: 74
End: 2024-12-23

## 2024-12-23 DIAGNOSIS — Z79.4 TYPE 2 DIABETES MELLITUS WITHOUT COMPLICATION, WITH LONG-TERM CURRENT USE OF INSULIN (HCC): ICD-10-CM

## 2024-12-23 DIAGNOSIS — E11.9 TYPE 2 DIABETES MELLITUS WITHOUT COMPLICATION, WITH LONG-TERM CURRENT USE OF INSULIN (HCC): ICD-10-CM

## 2024-12-23 NOTE — TELEPHONE ENCOUNTER
Ozzy was calling to get a refill on her medication     Metformin 1000 MG, 1x daily, 90 Days     [facility]     Phone number

## 2025-02-02 ENCOUNTER — APPOINTMENT (OUTPATIENT)
Dept: GENERAL RADIOLOGY | Age: 75
End: 2025-02-02
Payer: MEDICARE

## 2025-02-02 ENCOUNTER — APPOINTMENT (OUTPATIENT)
Dept: CT IMAGING | Age: 75
End: 2025-02-02
Payer: MEDICARE

## 2025-02-02 ENCOUNTER — HOSPITAL ENCOUNTER (EMERGENCY)
Age: 75
Discharge: HOME | End: 2025-02-03
Attending: EMERGENCY MEDICINE
Payer: MEDICARE

## 2025-02-02 VITALS
DIASTOLIC BLOOD PRESSURE: 58 MMHG | TEMPERATURE: 98.4 F | OXYGEN SATURATION: 99 % | SYSTOLIC BLOOD PRESSURE: 171 MMHG | HEART RATE: 93 BPM | RESPIRATION RATE: 18 BRPM

## 2025-02-02 DIAGNOSIS — M25.512 ACUTE PAIN OF LEFT SHOULDER: ICD-10-CM

## 2025-02-02 DIAGNOSIS — M54.2 NECK PAIN: Primary | ICD-10-CM

## 2025-02-02 LAB
GLUCOSE BLD-MCNC: 202 MG/DL (ref 70–99)
PERFORMED ON: ABNORMAL

## 2025-02-02 PROCEDURE — 99284 EMERGENCY DEPT VISIT MOD MDM: CPT

## 2025-02-02 PROCEDURE — 73030 X-RAY EXAM OF SHOULDER: CPT

## 2025-02-02 PROCEDURE — 72125 CT NECK SPINE W/O DYE: CPT

## 2025-02-02 PROCEDURE — 6370000000 HC RX 637 (ALT 250 FOR IP): Performed by: EMERGENCY MEDICINE

## 2025-02-02 RX ORDER — HYDROCODONE BITARTRATE AND ACETAMINOPHEN 5; 325 MG/1; MG/1
1 TABLET ORAL
Status: COMPLETED | OUTPATIENT
Start: 2025-02-02 | End: 2025-02-02

## 2025-02-02 RX ORDER — METHOCARBAMOL 500 MG/1
750 TABLET, FILM COATED ORAL ONCE
Status: COMPLETED | OUTPATIENT
Start: 2025-02-02 | End: 2025-02-02

## 2025-02-02 RX ADMIN — HYDROCODONE BITARTRATE AND ACETAMINOPHEN 1 TABLET: 5; 325 TABLET ORAL at 23:16

## 2025-02-02 RX ADMIN — METHOCARBAMOL 750 MG: 500 TABLET ORAL at 23:16

## 2025-02-02 ASSESSMENT — PAIN DESCRIPTION - ORIENTATION
ORIENTATION: LEFT
ORIENTATION: LEFT

## 2025-02-02 ASSESSMENT — PAIN SCALES - GENERAL
PAINLEVEL_OUTOF10: 8
PAINLEVEL_OUTOF10: 8

## 2025-02-02 ASSESSMENT — PAIN DESCRIPTION - PAIN TYPE: TYPE: ACUTE PAIN;CHRONIC PAIN

## 2025-02-02 ASSESSMENT — PAIN DESCRIPTION - LOCATION
LOCATION: NECK
LOCATION: NECK

## 2025-02-02 ASSESSMENT — PAIN - FUNCTIONAL ASSESSMENT
PAIN_FUNCTIONAL_ASSESSMENT: 0-10
PAIN_FUNCTIONAL_ASSESSMENT: PREVENTS OR INTERFERES SOME ACTIVE ACTIVITIES AND ADLS

## 2025-02-03 RX ORDER — METHOCARBAMOL 500 MG/1
500 TABLET, FILM COATED ORAL 4 TIMES DAILY
Qty: 40 TABLET | Refills: 0 | Status: SHIPPED | OUTPATIENT
Start: 2025-02-03 | End: 2025-02-13

## 2025-02-03 RX ORDER — PREDNISONE 20 MG/1
40 TABLET ORAL DAILY
Qty: 10 TABLET | Refills: 0 | Status: SHIPPED | OUTPATIENT
Start: 2025-02-03 | End: 2025-02-08

## 2025-02-03 NOTE — ED PROVIDER NOTES
EMERGENCY MEDICINE ATTENDING NOTE  Michi Lancaster Jr., DO, FACEP, FAAEM        CHIEF COMPLAINT  Chief Complaint   Patient presents with    Neck Pain     Pt c/o pain in left shoulder up to her neck. States she has had a lump on her shoulder for \"a long time\" that has been evaluated by an orthopedic doctor. States it has been painful but the pain is getting worse and she cannot tolerate it anymore. Also states she has inoperable brain CA.        HISTORY OF PRESENT ILLNESS  Dania Baez is a 74 y.o. female who presents to the ED for evaluation of left shoulder pain and neck pain.  Patient states she is had a lump over her left shoulder for years but over the last few days has gotten painful.  States the pain radiates up into the neck.  Denies any actual neck injury.  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  Denies any injury causing this.    Nursing/triage notes reviewed.  No other complaints, modifying factors or associated symptoms.     REVIEW OF SYSTEMS:  All systems are reviewed and are negative unless noted in the HPI.    PAST MEDICAL HISTORY  Past Medical History:   Diagnosis Date    Cancer (HCC)     Cataract     Diabetic retinopathy (HCC)     Glioma of brain (HCC)     oral treatment for maintenance    High blood pressure     Proliferative diabetic retinopathy, right eye (HCC)        SURGICAL HISTORY  Past Surgical History:   Procedure Laterality Date    CARPAL TUNNEL RELEASE  1999,2000    Bilateral    CATARACT REMOVAL WITH IMPLANT      [facility]    COLONOSCOPY  02/2022    Dr. Goyo Domínguez    COLONOSCOPY N/A 02/28/2022    COLONOSCOPY performed by Goyo Domínguez MD at [facility] ENDOSCOPY    CRANIOTOMY Left 12/22/2023    LEFT TEMPORAL BRAIN BIOPSY performed by Shahram Stanford MD at [facility] OR    FINGER TRIGGER RELEASE      FINGER TRIGGER RELEASE      FINGER TRIGGER RELEASE Left 03/08/2018    left ring finger

## 2025-02-05 ENCOUNTER — OFFICE VISIT (OUTPATIENT)
Dept: ENDOCRINOLOGY | Age: 75
End: 2025-02-05
Payer: MEDICARE

## 2025-02-05 VITALS
BODY MASS INDEX: 43.15 KG/M2 | SYSTOLIC BLOOD PRESSURE: 119 MMHG | HEART RATE: 83 BPM | RESPIRATION RATE: 14 BRPM | TEMPERATURE: 98 F | DIASTOLIC BLOOD PRESSURE: 60 MMHG | HEIGHT: 65 IN | WEIGHT: 259 LBS

## 2025-02-05 DIAGNOSIS — G40.89 OTHER SEIZURES (HCC): ICD-10-CM

## 2025-02-05 DIAGNOSIS — E66.01 MORBID OBESITY DUE TO EXCESS CALORIES: ICD-10-CM

## 2025-02-05 DIAGNOSIS — Z79.4 TYPE 2 DIABETES MELLITUS WITH STABLE PROLIFERATIVE RETINOPATHY OF RIGHT EYE, WITH LONG-TERM CURRENT USE OF INSULIN (HCC): ICD-10-CM

## 2025-02-05 DIAGNOSIS — I10 PRIMARY HYPERTENSION: ICD-10-CM

## 2025-02-05 DIAGNOSIS — Z79.4 TYPE 2 DIABETES MELLITUS WITH STABLE PROLIFERATIVE RETINOPATHY OF RIGHT EYE, WITH LONG-TERM CURRENT USE OF INSULIN (HCC): Primary | ICD-10-CM

## 2025-02-05 DIAGNOSIS — E11.3551 TYPE 2 DIABETES MELLITUS WITH STABLE PROLIFERATIVE RETINOPATHY OF RIGHT EYE, WITH LONG-TERM CURRENT USE OF INSULIN (HCC): Primary | ICD-10-CM

## 2025-02-05 DIAGNOSIS — E11.3551 TYPE 2 DIABETES MELLITUS WITH STABLE PROLIFERATIVE RETINOPATHY OF RIGHT EYE, WITH LONG-TERM CURRENT USE OF INSULIN (HCC): ICD-10-CM

## 2025-02-05 LAB
ALBUMIN SERPL-MCNC: 4.4 G/DL (ref 3.4–5)
ALBUMIN/GLOB SERPL: 1.5 {RATIO} (ref 1.1–2.2)
ALP SERPL-CCNC: 76 U/L (ref 40–129)
ALT SERPL-CCNC: 14 U/L (ref 10–40)
ANION GAP SERPL CALCULATED.3IONS-SCNC: 9 MMOL/L (ref 3–16)
AST SERPL-CCNC: 17 U/L (ref 15–37)
BILIRUB SERPL-MCNC: 0.3 MG/DL (ref 0–1)
BUN SERPL-MCNC: 33 MG/DL (ref 7–20)
CALCIUM SERPL-MCNC: 10.3 MG/DL (ref 8.3–10.6)
CHLORIDE SERPL-SCNC: 105 MMOL/L (ref 99–110)
CHOLEST SERPL-MCNC: 176 MG/DL (ref 0–199)
CO2 SERPL-SCNC: 26 MMOL/L (ref 21–32)
CREAT SERPL-MCNC: 1.4 MG/DL (ref 0.6–1.2)
GFR SERPLBLD CREATININE-BSD FMLA CKD-EPI: 40 ML/MIN/{1.73_M2}
GLUCOSE SERPL-MCNC: 118 MG/DL (ref 70–99)
HBA1C MFR BLD: 6.1 %
HDLC SERPL-MCNC: 84 MG/DL (ref 40–60)
LDLC SERPL CALC-MCNC: 71 MG/DL
POTASSIUM SERPL-SCNC: 4.4 MMOL/L (ref 3.5–5.1)
PROT SERPL-MCNC: 7.3 G/DL (ref 6.4–8.2)
SODIUM SERPL-SCNC: 140 MMOL/L (ref 136–145)
TRIGL SERPL-MCNC: 104 MG/DL (ref 0–150)
TSH SERPL DL<=0.005 MIU/L-ACNC: 3.46 UIU/ML (ref 0.27–4.2)
VLDLC SERPL CALC-MCNC: 21 MG/DL

## 2025-02-05 PROCEDURE — 1160F RVW MEDS BY RX/DR IN RCRD: CPT | Performed by: INTERNAL MEDICINE

## 2025-02-05 PROCEDURE — 3078F DIAST BP <80 MM HG: CPT | Performed by: INTERNAL MEDICINE

## 2025-02-05 PROCEDURE — 1123F ACP DISCUSS/DSCN MKR DOCD: CPT | Performed by: INTERNAL MEDICINE

## 2025-02-05 PROCEDURE — 83036 HEMOGLOBIN GLYCOSYLATED A1C: CPT | Performed by: INTERNAL MEDICINE

## 2025-02-05 PROCEDURE — 99204 OFFICE O/P NEW MOD 45 MIN: CPT | Performed by: INTERNAL MEDICINE

## 2025-02-05 PROCEDURE — 3074F SYST BP LT 130 MM HG: CPT | Performed by: INTERNAL MEDICINE

## 2025-02-05 PROCEDURE — 95251 CONT GLUC MNTR ANALYSIS I&R: CPT | Performed by: INTERNAL MEDICINE

## 2025-02-05 PROCEDURE — 3044F HG A1C LEVEL LT 7.0%: CPT | Performed by: INTERNAL MEDICINE

## 2025-02-05 PROCEDURE — 1159F MED LIST DOCD IN RCRD: CPT | Performed by: INTERNAL MEDICINE

## 2025-02-05 RX ORDER — INSULIN GLARGINE 300 U/ML
INJECTION, SOLUTION SUBCUTANEOUS
Qty: 5 ADJUSTABLE DOSE PRE-FILLED PEN SYRINGE | Refills: 4 | Status: SHIPPED | OUTPATIENT
Start: 2025-02-05

## 2025-02-05 RX ORDER — INSULIN ASPART 100 [IU]/ML
INJECTION, SOLUTION INTRAVENOUS; SUBCUTANEOUS
Qty: 5 ADJUSTABLE DOSE PRE-FILLED PEN SYRINGE | Refills: 3 | Status: SHIPPED | OUTPATIENT
Start: 2025-02-05

## 2025-02-05 ASSESSMENT — ENCOUNTER SYMPTOMS: BLURRED VISION: 0

## 2025-02-05 NOTE — PROGRESS NOTES
and/or ordered clinical lab results yes   Reviewed and/or ordered radiology tests Yes  Reviewed and/or ordered other diagnostic tests yes   Made a decision to obtain old records yes   Reviewed and summarized old records yes     Dania Baez was counseled regarding symptoms of current diagnosis, course and complications of disease if inadequately treated, side effects of medications, diagnosis, treatment options, and prognosis, risks, benefits, complications, and alternatives of treatment, labs, imaging and other studies and treatment targets and goals.  She understands instructions and counseling    Total time spent counseling 40 min  , more than 50 % of this was spent on face to face counseling.     Diabetes Continuous Glucose Monitoring Report         Reason for Study:     - improve diabetic control without risk of hypoglycemia       Current Medication regimen:   Basal Bolus insulin regimen      CGMS Report     CGMS data collection was performed on 2/5/2025  Patient provided information on her  diet, activities and insulin dosing  during this period.   Data was available for 7+ days     Sensor Data Report:   - 12 AM to 6 AM: Overnight blood glucose pattern shows stable glycemia  - 6   AM to 10 AM:  Post breakfast hyperglycemia is noted  --10AM to 5 PM : No hypoglycemia observed during this time.  - 5   PM to 8 PM: Post meal hyperglycemia is noted       Average reading 180 mg/dL     Standard Dev/coefficient of variation   52   % of time <70 mg/dL  0  %   % of time >180  mg/dL 46  %   % of time within range  54  %  Number of hypoglycemia episodes noted: 0     Impression:   CGMS shows stable glycemia overnight with postprandial hyperglycemia     Recommendation:      Patient was advised to make the following changes in her diabetic regimen    Take meal boluses 10 minutes prior to eating            Return in about 3 months (around 5/5/2025).    Please note that some or all of this report was generated using voice

## 2025-02-05 NOTE — PATIENT INSTRUCTIONS
GOALS - 1. HBA1C (3MONTH AVG) SHOULD BE LESS THAN 6.5     PLEASE CHECK YOUR SUGARS:   BEFORE BREAKFAST  BEFORE LUNCH  BEFORE DINNER  BEDTIME  AFTER MEALS    2. FINGERSTICKS SHOULD BE   BEFORE MEALS <   AFTER MEALS < 140   BEDTIME >100     3. CARBOHYDRATES  MEALS 40--60 G  SNACKS 15 - 20 G    4. BLOOD PRESSURE  < 130/80    --- Start laong acting insulin 30   units daily      Short acting Novolog/ humalog take 4-10 units aprox 10 min before eating     Patient Education        Hypoglycemia: Care Instructions  Overview  Hypoglycemia means that your blood sugar is low and your body isn’t getting enough fuel. Low blood sugar can be caused by too much insulin or certain medicines. Some people get low blood sugar from missing a meal or exercising too hard without eating enough food.    Know your signs of low blood sugar. They're different for everyone. Common early signs include nausea; hunger; and feeling nervous, irritable, or shaky.   It can help to check your blood sugar levels often. Take your insulin or other medicine as prescribed.   How can you care for yourself?   Use the \"rule of 15\" to treat low blood sugar.  Eat 15 grams of carbohydrate from a quick-sugar food (such as 3 or 4 glucose tablets or 1/2 cup of juice). Wait 15 minutes and check your blood sugar. Repeat if your blood sugar is still below 70 mg/dL.    Eat after your blood sugar is in a safe range.  A snack or meal can reduce symptoms and prevent low blood sugar from coming back.    Tell friends, family, and coworkers how they can help you.  Make sure that they know the symptoms of low blood sugar and how to help you get your sugar levels up.    If you have glucagon, keep it with you.  Make sure that your friends, family, and coworkers know how to use it.   When should you call for help?    Call 911 anytime you think you may need emergency care. For example, call if:  You passed out (lost consciousness).  You are confused or cannot think

## 2025-02-06 ENCOUNTER — TELEPHONE (OUTPATIENT)
Dept: ENDOCRINOLOGY | Age: 75
End: 2025-02-06

## 2025-02-06 NOTE — TELEPHONE ENCOUNTER
Fax from [facility]  Pt has been provided a a temporary supply of Novolog Flex  This drug is either not included on our list of cov'd drugs

## 2025-02-07 LAB
C PEPTIDE SERPL-MCNC: 2 NG/ML (ref 1.1–4.4)
GAD65 AB SER IA-ACNC: <5 IU/ML (ref 0–5)

## 2025-02-20 ENCOUNTER — TELEPHONE (OUTPATIENT)
Dept: FAMILY MEDICINE CLINIC | Age: 75
End: 2025-02-20

## 2025-02-20 NOTE — TELEPHONE ENCOUNTER
Brynn with [facility] just completed the yearly   Visit and it is uploaded in the [facility] provider portal  in case you want   To view it.    .

## 2025-02-24 DIAGNOSIS — E11.3551 TYPE 2 DIABETES MELLITUS WITH STABLE PROLIFERATIVE RETINOPATHY OF RIGHT EYE, WITH LONG-TERM CURRENT USE OF INSULIN (HCC): ICD-10-CM

## 2025-02-24 DIAGNOSIS — Z79.4 TYPE 2 DIABETES MELLITUS WITH STABLE PROLIFERATIVE RETINOPATHY OF RIGHT EYE, WITH LONG-TERM CURRENT USE OF INSULIN (HCC): ICD-10-CM

## 2025-02-24 RX ORDER — ACYCLOVIR 400 MG/1
TABLET ORAL
Qty: 3 EACH | Refills: 3 | OUTPATIENT
Start: 2025-02-24

## 2025-02-24 NOTE — TELEPHONE ENCOUNTER
PATIENT SAID IN MESSAGE SHE GETS THEM AT [facility]. MARY WOULD NEED TO WRITE THE ORDER AND WE WOULD NEED TO FAX IT. I ONLY DO [employer] ONLINE

## 2025-02-24 NOTE — TELEPHONE ENCOUNTER
Pt needs a refill on her Dexcom G7 sensor--she should have gotten her refill on the 17.   The company said they did not have an order on file.  She is not going to the Endo anymore she had the one visit  and will not be going back.    She spoke to [facility] and they said when they receive the order they will get it shipped out ASAP.    Please call pt      She made an appt for March 26 with Lety at 1:00 pm

## 2025-02-25 ENCOUNTER — HOSPITAL ENCOUNTER (OUTPATIENT)
Dept: MRI IMAGING | Age: 75
Discharge: HOME | End: 2025-02-25
Attending: STUDENT IN AN ORGANIZED HEALTH CARE EDUCATION/TRAINING PROGRAM
Payer: MEDICARE

## 2025-02-25 DIAGNOSIS — C71.2 GLIOBLASTOMA MULTIFORME OF TEMPORAL LOBE (HCC): ICD-10-CM

## 2025-02-25 PROCEDURE — A9577 INJ MULTIHANCE: HCPCS | Performed by: STUDENT IN AN ORGANIZED HEALTH CARE EDUCATION/TRAINING PROGRAM

## 2025-02-25 PROCEDURE — 70553 MRI BRAIN STEM W/O & W/DYE: CPT

## 2025-02-25 PROCEDURE — 6360000004 HC RX CONTRAST MEDICATION: Performed by: STUDENT IN AN ORGANIZED HEALTH CARE EDUCATION/TRAINING PROGRAM

## 2025-02-25 RX ORDER — ACYCLOVIR 400 MG/1
TABLET ORAL
Qty: 1 EACH | Refills: 2 | Status: SHIPPED | OUTPATIENT
Start: 2025-02-25

## 2025-02-25 RX ADMIN — GADOBENATE DIMEGLUMINE 12 ML: 529 INJECTION, SOLUTION INTRAVENOUS at 12:58

## 2025-03-03 ENCOUNTER — TELEPHONE (OUTPATIENT)
Dept: ENDOCRINOLOGY | Age: 75
End: 2025-03-03

## 2025-03-03 NOTE — TELEPHONE ENCOUNTER
Form refaxed to [facility] to correct provider name. Form that was faxed to office was under another provider name.

## 2025-03-14 ENCOUNTER — TELEPHONE (OUTPATIENT)
Dept: FAMILY MEDICINE CLINIC | Age: 75
End: 2025-03-14

## 2025-03-14 NOTE — TELEPHONE ENCOUNTER
SPOKE WITH PT.  SHE WILL BE USING [facility] NOW.  WOULD LIKE TO KNOW IF MARY WILL TAKE OVER ALL HER MEDICATIONS BECAUSE IT IS TOO CONFUSING AND HAS A HARD TIME GETTING REFILLS.  MESSAGE FORWARDED TO MARY TO REVIEW UPON HER RETURN ON MONDAY.  OK TO WAIT PER PT.

## 2025-03-23 ASSESSMENT — PATIENT HEALTH QUESTIONNAIRE - PHQ9
1. LITTLE INTEREST OR PLEASURE IN DOING THINGS: NOT AT ALL
2. FEELING DOWN, DEPRESSED OR HOPELESS: NOT AT ALL
SUM OF ALL RESPONSES TO PHQ QUESTIONS 1-9: 0
SUM OF ALL RESPONSES TO PHQ QUESTIONS 1-9: 0
2. FEELING DOWN, DEPRESSED OR HOPELESS: NOT AT ALL
SUM OF ALL RESPONSES TO PHQ QUESTIONS 1-9: 0
1. LITTLE INTEREST OR PLEASURE IN DOING THINGS: NOT AT ALL
SUM OF ALL RESPONSES TO PHQ9 QUESTIONS 1 & 2: 0
SUM OF ALL RESPONSES TO PHQ QUESTIONS 1-9: 0

## 2025-03-26 ENCOUNTER — OFFICE VISIT (OUTPATIENT)
Dept: FAMILY MEDICINE CLINIC | Age: 75
End: 2025-03-26
Payer: MEDICARE

## 2025-03-26 VITALS
SYSTOLIC BLOOD PRESSURE: 126 MMHG | DIASTOLIC BLOOD PRESSURE: 82 MMHG | OXYGEN SATURATION: 100 % | WEIGHT: 254 LBS | HEART RATE: 90 BPM | HEIGHT: 65 IN | BODY MASS INDEX: 42.32 KG/M2

## 2025-03-26 DIAGNOSIS — D61.818 PANCYTOPENIA: ICD-10-CM

## 2025-03-26 DIAGNOSIS — Z79.4 TYPE 2 DIABETES MELLITUS WITH STABLE PROLIFERATIVE RETINOPATHY OF RIGHT EYE, WITH LONG-TERM CURRENT USE OF INSULIN (HCC): ICD-10-CM

## 2025-03-26 DIAGNOSIS — E11.3551 TYPE 2 DIABETES MELLITUS WITH STABLE PROLIFERATIVE RETINOPATHY OF RIGHT EYE, WITH LONG-TERM CURRENT USE OF INSULIN (HCC): ICD-10-CM

## 2025-03-26 PROCEDURE — 3044F HG A1C LEVEL LT 7.0%: CPT | Performed by: NURSE PRACTITIONER

## 2025-03-26 PROCEDURE — 1123F ACP DISCUSS/DSCN MKR DOCD: CPT | Performed by: NURSE PRACTITIONER

## 2025-03-26 PROCEDURE — 99213 OFFICE O/P EST LOW 20 MIN: CPT | Performed by: NURSE PRACTITIONER

## 2025-03-26 PROCEDURE — 3074F SYST BP LT 130 MM HG: CPT | Performed by: NURSE PRACTITIONER

## 2025-03-26 PROCEDURE — 3079F DIAST BP 80-89 MM HG: CPT | Performed by: NURSE PRACTITIONER

## 2025-03-26 PROCEDURE — 1159F MED LIST DOCD IN RCRD: CPT | Performed by: NURSE PRACTITIONER

## 2025-03-26 RX ORDER — ACYCLOVIR 400 MG/1
TABLET ORAL
Qty: 9 EACH | Refills: 3 | Status: SHIPPED | OUTPATIENT
Start: 2025-03-26 | End: 2025-03-26 | Stop reason: SDUPTHER

## 2025-03-26 RX ORDER — ACYCLOVIR 400 MG/1
TABLET ORAL
Qty: 9 EACH | Refills: 3 | Status: SHIPPED | OUTPATIENT
Start: 2025-03-26

## 2025-03-26 RX ORDER — ALLOPURINOL 300 MG/1
300 TABLET ORAL DAILY
Qty: 90 TABLET | Refills: 3 | Status: SHIPPED | OUTPATIENT
Start: 2025-03-26

## 2025-03-26 NOTE — ASSESSMENT & PLAN NOTE
Chronic, at goal (stable), continue current treatment plan    Orders:    Continuous Glucose  (DEXCOM G7 ) SHYANNE; USE TO CHECK BLOOD SUGAR 3-4 X DAILY

## 2025-04-07 ENCOUNTER — TELEPHONE (OUTPATIENT)
Dept: FAMILY MEDICINE CLINIC | Age: 75
End: 2025-04-07

## 2025-04-07 DIAGNOSIS — I10 ESSENTIAL HYPERTENSION: ICD-10-CM

## 2025-04-07 RX ORDER — POTASSIUM CHLORIDE 750 MG/1
10 TABLET, EXTENDED RELEASE ORAL EVERY OTHER DAY
Qty: 50 TABLET | Refills: 0 | Status: SHIPPED | OUTPATIENT
Start: 2025-04-07

## 2025-04-07 NOTE — TELEPHONE ENCOUNTER
Patient needs a refill on her medication POTASSIUM CHLORIDE EXTENDED RELEASE TABLET 10 MG - 1 TABLET PER DAY - 90 DAY SUPPLY.    [facility] - [id number] - phone no. [phone]      Please give her a call back.

## 2025-04-23 ENCOUNTER — HOSPITAL ENCOUNTER (EMERGENCY)
Age: 75
Discharge: HOME | End: 2025-04-23
Attending: EMERGENCY MEDICINE
Payer: MEDICARE

## 2025-04-23 ENCOUNTER — APPOINTMENT (OUTPATIENT)
Dept: CT IMAGING | Age: 75
End: 2025-04-23
Payer: MEDICARE

## 2025-04-23 VITALS
TEMPERATURE: 97.6 F | RESPIRATION RATE: 16 BRPM | WEIGHT: 254 LBS | OXYGEN SATURATION: 100 % | BODY MASS INDEX: 42.32 KG/M2 | DIASTOLIC BLOOD PRESSURE: 66 MMHG | HEART RATE: 74 BPM | HEIGHT: 65 IN | SYSTOLIC BLOOD PRESSURE: 140 MMHG

## 2025-04-23 DIAGNOSIS — R51.9 ACUTE NONINTRACTABLE HEADACHE, UNSPECIFIED HEADACHE TYPE: Primary | ICD-10-CM

## 2025-04-23 PROCEDURE — 70450 CT HEAD/BRAIN W/O DYE: CPT

## 2025-04-23 PROCEDURE — 6370000000 HC RX 637 (ALT 250 FOR IP): Performed by: EMERGENCY MEDICINE

## 2025-04-23 PROCEDURE — 99284 EMERGENCY DEPT VISIT MOD MDM: CPT

## 2025-04-23 RX ORDER — ACETAMINOPHEN 500 MG
1000 TABLET ORAL
Status: COMPLETED | OUTPATIENT
Start: 2025-04-23 | End: 2025-04-23

## 2025-04-23 RX ORDER — ACETAMINOPHEN 500 MG
TABLET ORAL
Status: DISCONTINUED
Start: 2025-04-23 | End: 2025-04-23 | Stop reason: HOSPADM

## 2025-04-23 RX ADMIN — ACETAMINOPHEN 1000 MG: 500 TABLET ORAL at 04:15

## 2025-04-23 ASSESSMENT — PAIN - FUNCTIONAL ASSESSMENT: PAIN_FUNCTIONAL_ASSESSMENT: NONE - DENIES PAIN

## 2025-04-23 NOTE — ED PROVIDER NOTES
Pt is well appearing, comfortable on arrival, neurologic exam is reassuring, she is afebrile with normal vital signs. Pt given tylenol for her HA, CT and some lab work obtained given hx of malignancy but overall pt appears well. Workup is unremarkable.   Patient and family informed about their diagnosis and plan. They were counseled about home care instructions, return precautions, need for follow-up and all medication instructions.  They were given an opportunity to ask questions and all of the questions were answered prior to discharge.  .    Is this patient to be included in the SEP-1 core measure due to severe sepsis or septic shock? No Exclusion criteria - the patient is NOT to be included for SEP-1 Core Measure due to: Infection is not suspected     Medical Decision Making  Amount and/or Complexity of Data Reviewed  Radiology: ordered.    Risk  OTC drugs.           Clinical Impression     1. Acute nonintractable headache, unspecified headache type        Disposition     PATIENT REFERRED TO:  Lety Snyder, APRN - CNP  [address] [address]  [phone]    Schedule an appointment as soon as possible for a visit       [facility] Emergency Department  [address] [place], [place] [id number] [phone]    As needed      DISCHARGE MEDICATIONS:  Discharge Medication List as of 4/23/2025  5:26 AM          DISPOSITION Decision To Discharge 04/23/2025 04:59:51 AM   DISPOSITION CONDITION Stable              Jonnathan Brandt MD  04/27/25 0059

## 2025-05-05 ENCOUNTER — HOSPITAL ENCOUNTER (OUTPATIENT)
Dept: MRI IMAGING | Age: 75
Discharge: HOME | End: 2025-05-05
Attending: STUDENT IN AN ORGANIZED HEALTH CARE EDUCATION/TRAINING PROGRAM
Payer: COMMERCIAL

## 2025-05-05 DIAGNOSIS — C71.9 GLIOMA (HCC): ICD-10-CM

## 2025-05-05 LAB
CREAT SERPL-MCNC: 1.3 MG/DL (ref 0.6–1.2)
GFR SERPLBLD CREATININE-BSD FMLA CKD-EPI: 43 ML/MIN/{1.73_M2}

## 2025-05-05 PROCEDURE — A9577 INJ MULTIHANCE: HCPCS | Performed by: STUDENT IN AN ORGANIZED HEALTH CARE EDUCATION/TRAINING PROGRAM

## 2025-05-05 PROCEDURE — 82565 ASSAY OF CREATININE: CPT

## 2025-05-05 PROCEDURE — 36415 COLL VENOUS BLD VENIPUNCTURE: CPT

## 2025-05-05 PROCEDURE — 70553 MRI BRAIN STEM W/O & W/DYE: CPT

## 2025-05-05 PROCEDURE — 6360000004 HC RX CONTRAST MEDICATION: Performed by: STUDENT IN AN ORGANIZED HEALTH CARE EDUCATION/TRAINING PROGRAM

## 2025-05-05 RX ADMIN — GADOBENATE DIMEGLUMINE 12 ML: 529 INJECTION, SOLUTION INTRAVENOUS at 14:08

## 2025-05-06 ENCOUNTER — TELEPHONE (OUTPATIENT)
Dept: ADMINISTRATIVE | Age: 75
End: 2025-05-06

## 2025-05-06 NOTE — TELEPHONE ENCOUNTER
Submitted PA for Dexcom G7 Sensor   [employer] Key: [id number] STATUS: PA Case: [id number], Status: Approved, Coverage Starts on: 2/4/2025 12:00:00 AM, Coverage Ends on: 5/6/2026 12:00:00 AM.     Please notify patient. Thank you.

## 2025-06-02 DIAGNOSIS — I10 ESSENTIAL HYPERTENSION: ICD-10-CM

## 2025-06-02 RX ORDER — TRIAMTERENE AND HYDROCHLOROTHIAZIDE 37.5; 25 MG/1; MG/1
1 TABLET ORAL DAILY
Qty: 100 TABLET | Refills: 0 | Status: SHIPPED | OUTPATIENT
Start: 2025-06-02

## 2025-06-02 RX ORDER — POTASSIUM CHLORIDE 750 MG/1
10 TABLET, EXTENDED RELEASE ORAL EVERY OTHER DAY
Qty: 50 TABLET | Refills: 0 | Status: SHIPPED | OUTPATIENT
Start: 2025-06-02

## 2025-06-02 RX ORDER — AMLODIPINE BESYLATE 5 MG/1
5 TABLET ORAL DAILY
Qty: 100 TABLET | Refills: 0 | Status: SHIPPED | OUTPATIENT
Start: 2025-06-02

## 2025-06-02 RX ORDER — TELMISARTAN 80 MG/1
80 TABLET ORAL DAILY
Qty: 100 TABLET | Refills: 0 | Status: SHIPPED | OUTPATIENT
Start: 2025-06-02

## 2025-06-02 NOTE — TELEPHONE ENCOUNTER
Patient was calling to get refills     Metformin 1000MG, 1x daily, 90 days     Triamterene- hydrochlorothyzide 37.5- 25MG, 1x daily, 90 days     Potassium Chloride 10MEQ, 1x daily, 90 days     Telmisartan 80MG, 1x daily, 90 days     Amlodipine 5MCG, 1x daily, 90 days       [facility]   Phone number

## 2025-06-17 ENCOUNTER — TELEPHONE (OUTPATIENT)
Dept: FAMILY MEDICINE CLINIC | Age: 75
End: 2025-06-17

## 2025-06-17 NOTE — TELEPHONE ENCOUNTER
Patient called in stating she would like a handicap placard. She states she has been using her brothers but it is about to . She would like her own placard since she can only walk short distances.     Please give her a call back.   She states she would be able to pick this up.

## 2025-07-01 ENCOUNTER — TELEPHONE (OUTPATIENT)
Dept: ENDOCRINOLOGY | Age: 75
End: 2025-07-01

## 2025-07-02 NOTE — TELEPHONE ENCOUNTER
1 month supplies were approved for CGM medicare will only supply 30 days at a time do to chart notes needing sent after visits

## 2025-07-27 SDOH — HEALTH STABILITY: PHYSICAL HEALTH: ON AVERAGE, HOW MANY MINUTES DO YOU ENGAGE IN EXERCISE AT THIS LEVEL?: 0 MIN

## 2025-07-27 SDOH — HEALTH STABILITY: PHYSICAL HEALTH: ON AVERAGE, HOW MANY DAYS PER WEEK DO YOU ENGAGE IN MODERATE TO STRENUOUS EXERCISE (LIKE A BRISK WALK)?: 0 DAYS

## 2025-07-27 ASSESSMENT — PATIENT HEALTH QUESTIONNAIRE - PHQ9
SUM OF ALL RESPONSES TO PHQ QUESTIONS 1-9: 0
SUM OF ALL RESPONSES TO PHQ QUESTIONS 1-9: 0
2. FEELING DOWN, DEPRESSED OR HOPELESS: NOT AT ALL
1. LITTLE INTEREST OR PLEASURE IN DOING THINGS: NOT AT ALL
SUM OF ALL RESPONSES TO PHQ QUESTIONS 1-9: 0
SUM OF ALL RESPONSES TO PHQ QUESTIONS 1-9: 0

## 2025-07-27 ASSESSMENT — LIFESTYLE VARIABLES
HOW OFTEN DO YOU HAVE A DRINK CONTAINING ALCOHOL: 1
HOW MANY STANDARD DRINKS CONTAINING ALCOHOL DO YOU HAVE ON A TYPICAL DAY: PATIENT DOES NOT DRINK
HOW OFTEN DO YOU HAVE SIX OR MORE DRINKS ON ONE OCCASION: 1
HOW MANY STANDARD DRINKS CONTAINING ALCOHOL DO YOU HAVE ON A TYPICAL DAY: 0
HOW OFTEN DO YOU HAVE A DRINK CONTAINING ALCOHOL: NEVER

## 2025-07-28 ENCOUNTER — OFFICE VISIT (OUTPATIENT)
Dept: FAMILY MEDICINE CLINIC | Age: 75
End: 2025-07-28
Payer: MEDICARE

## 2025-07-28 VITALS
HEIGHT: 65 IN | SYSTOLIC BLOOD PRESSURE: 120 MMHG | BODY MASS INDEX: 42.15 KG/M2 | HEART RATE: 78 BPM | DIASTOLIC BLOOD PRESSURE: 72 MMHG | WEIGHT: 253 LBS | OXYGEN SATURATION: 99 %

## 2025-07-28 DIAGNOSIS — R94.4 DECREASED GFR: ICD-10-CM

## 2025-07-28 DIAGNOSIS — E11.3551 TYPE 2 DIABETES MELLITUS WITH STABLE PROLIFERATIVE RETINOPATHY OF RIGHT EYE, WITH LONG-TERM CURRENT USE OF INSULIN (HCC): ICD-10-CM

## 2025-07-28 DIAGNOSIS — Z12.31 ENCOUNTER FOR SCREENING MAMMOGRAM FOR MALIGNANT NEOPLASM OF BREAST: ICD-10-CM

## 2025-07-28 DIAGNOSIS — C71.9 GLIOMA (HCC): ICD-10-CM

## 2025-07-28 DIAGNOSIS — Z79.4 TYPE 2 DIABETES MELLITUS WITH STABLE PROLIFERATIVE RETINOPATHY OF RIGHT EYE, WITH LONG-TERM CURRENT USE OF INSULIN (HCC): ICD-10-CM

## 2025-07-28 DIAGNOSIS — Z00.00 MEDICARE ANNUAL WELLNESS VISIT, SUBSEQUENT: Primary | ICD-10-CM

## 2025-07-28 LAB
ANION GAP SERPL CALCULATED.3IONS-SCNC: 9 MMOL/L (ref 3–16)
BUN SERPL-MCNC: 31 MG/DL (ref 7–20)
CALCIUM SERPL-MCNC: 9.9 MG/DL (ref 8.3–10.6)
CHLORIDE SERPL-SCNC: 107 MMOL/L (ref 99–110)
CO2 SERPL-SCNC: 23 MMOL/L (ref 21–32)
CREAT SERPL-MCNC: 1.5 MG/DL (ref 0.6–1.2)
GFR SERPLBLD CREATININE-BSD FMLA CKD-EPI: 37 ML/MIN/{1.73_M2}
GLUCOSE SERPL-MCNC: 135 MG/DL (ref 70–99)
HBA1C MFR BLD: 6.8 %
POTASSIUM SERPL-SCNC: 4.8 MMOL/L (ref 3.5–5.1)
SODIUM SERPL-SCNC: 139 MMOL/L (ref 136–145)

## 2025-07-28 PROCEDURE — 1159F MED LIST DOCD IN RCRD: CPT | Performed by: NURSE PRACTITIONER

## 2025-07-28 PROCEDURE — 1160F RVW MEDS BY RX/DR IN RCRD: CPT | Performed by: NURSE PRACTITIONER

## 2025-07-28 PROCEDURE — 3074F SYST BP LT 130 MM HG: CPT | Performed by: NURSE PRACTITIONER

## 2025-07-28 PROCEDURE — 99213 OFFICE O/P EST LOW 20 MIN: CPT | Performed by: NURSE PRACTITIONER

## 2025-07-28 PROCEDURE — 1123F ACP DISCUSS/DSCN MKR DOCD: CPT | Performed by: NURSE PRACTITIONER

## 2025-07-28 PROCEDURE — 3044F HG A1C LEVEL LT 7.0%: CPT | Performed by: NURSE PRACTITIONER

## 2025-07-28 PROCEDURE — 3078F DIAST BP <80 MM HG: CPT | Performed by: NURSE PRACTITIONER

## 2025-07-28 PROCEDURE — G0439 PPPS, SUBSEQ VISIT: HCPCS | Performed by: NURSE PRACTITIONER

## 2025-07-28 PROCEDURE — 83036 HEMOGLOBIN GLYCOSYLATED A1C: CPT | Performed by: NURSE PRACTITIONER

## 2025-07-28 RX ORDER — ACYCLOVIR 400 MG/1
TABLET ORAL
COMMUNITY
Start: 2025-06-04 | End: 2025-07-28 | Stop reason: ALTCHOICE

## 2025-07-28 RX ORDER — ACYCLOVIR 400 MG/1
TABLET ORAL
Qty: 9 EACH | Refills: 3 | Status: SHIPPED
Start: 2025-07-28 | End: 2025-07-28 | Stop reason: ALTCHOICE

## 2025-07-28 RX ORDER — FERROUS SULFATE 325(65) MG
325 TABLET, DELAYED RELEASE (ENTERIC COATED) ORAL
COMMUNITY

## 2025-07-28 NOTE — PROGRESS NOTES
Medicare Annual Wellness Visit    Dania Baez is here for Medicare AWV (Medicare Annual Wellness Visit )    Assessment & Plan   Medicare annual wellness visit, subsequent     Return in 1 year (on 7/28/2026) for Medicare AWV.    Dania was seen today for medicare awv.    Diagnoses and all orders for this visit:    Medicare annual wellness visit, subsequent  Wellness recommendations reviewed with pt  Glioma ([facility])  Followed by oncology  Decreased GFR  -     Basic Metabolic Panel; Future  -     Basic Metabolic Panel    Type 2 diabetes mellitus with stable proliferative retinopathy of right eye, with long-term current use of insulin ([facility])  POCT HGA1C 6/8  CONTINUE INSULIN AS PRESCRIBED  -     Albumin/Creatinine Ratio, Urine; Future  -      Continuous Glucose  (DEXCOM G7 ) SHYANNE; USE TO CHECK BLOOD SUGAR 3-4 X DAILY apply one sensor every ten days      metFORMIN (GLUCOPHAGE) 1000 MG tablet; Take 1 tablet by mouth daily (with breakfast)  [facility]    Encounter for screening mammogram for malignant neoplasm of breast  -     AVEL DIGITAL SCREEN W OR WO CAD BILATERAL; Future              Subjective   The following acute and/or chronic problems were also addressed today:      Diabetes  Routine follow up - last HGA1C 6.1  No longer going to Dr Stone  She is taking the metformin as prescribed  Recent eye exam  She is checking her blood sugars at home- using dexcom  Has not had any issues with the metformin  Toujeo 30 units  NPH 20 units  She is not exercising but is staying active  Diagnosed with a glioblastoma about 1 year ago   Doing well - seeing oncologist ( radiologist ) Dr Casas    Patient's complete Health Risk Assessment and screening values have been reviewed and are found in Flowsheets. The following problems were reviewed today and where indicated follow up appointments were made and/or referrals ordered.    Positive Risk Factor Screenings with Interventions:

## 2025-08-29 ENCOUNTER — HOSPITAL ENCOUNTER (OUTPATIENT)
Dept: MRI IMAGING | Age: 75
Discharge: HOME | End: 2025-08-29
Attending: STUDENT IN AN ORGANIZED HEALTH CARE EDUCATION/TRAINING PROGRAM
Payer: MEDICARE

## 2025-08-29 DIAGNOSIS — C71.2 GLIOBLASTOMA MULTIFORME OF TEMPORAL LOBE (HCC): ICD-10-CM

## 2025-08-29 PROCEDURE — A9577 INJ MULTIHANCE: HCPCS | Performed by: STUDENT IN AN ORGANIZED HEALTH CARE EDUCATION/TRAINING PROGRAM

## 2025-08-29 PROCEDURE — 6360000004 HC RX CONTRAST MEDICATION: Performed by: STUDENT IN AN ORGANIZED HEALTH CARE EDUCATION/TRAINING PROGRAM

## 2025-08-29 PROCEDURE — 70553 MRI BRAIN STEM W/O & W/DYE: CPT

## 2025-08-29 RX ADMIN — GADOBENATE DIMEGLUMINE 15 ML: 529 INJECTION, SOLUTION INTRAVENOUS at 14:32

## 2025-09-02 DIAGNOSIS — I10 ESSENTIAL HYPERTENSION: ICD-10-CM

## 2025-09-02 RX ORDER — TRIAMTERENE AND HYDROCHLOROTHIAZIDE 37.5; 25 MG/1; MG/1
1 TABLET ORAL DAILY
Qty: 100 TABLET | Refills: 0 | Status: SHIPPED | OUTPATIENT
Start: 2025-09-02

## 2025-09-02 RX ORDER — POTASSIUM CHLORIDE 750 MG/1
10 TABLET, EXTENDED RELEASE ORAL EVERY OTHER DAY
Qty: 50 TABLET | Refills: 0 | Status: SHIPPED | OUTPATIENT
Start: 2025-09-02

## 2025-09-02 RX ORDER — AMLODIPINE BESYLATE 5 MG/1
5 TABLET ORAL DAILY
Qty: 100 TABLET | Refills: 0 | Status: SHIPPED | OUTPATIENT
Start: 2025-09-02

## 2025-09-02 RX ORDER — TELMISARTAN 80 MG/1
80 TABLET ORAL DAILY
Qty: 100 TABLET | Refills: 0 | Status: SHIPPED | OUTPATIENT
Start: 2025-09-02

## (undated) DEVICE — GLOVE SURG SZ 75 L12IN FNGR THK94MIL TRNSLUC YEL LTX

## (undated) DEVICE — COVER LT HNDL CAM BLU DISP W/ SURG CTRL

## (undated) DEVICE — NEURO SPONGES: Brand: DEROYAL

## (undated) DEVICE — NEEDLE BX OD1.8MM L150MM CUT WIND L10MM MTL RUL SYR SYR

## (undated) DEVICE — BLADE CLIPPER SURG SENSICLIP

## (undated) DEVICE — TOOL MR8-9BA50 MR8 9CM BALL 5MM: Brand: MIDAS REX MR8

## (undated) DEVICE — UNDERGLOVE SURG SZ 8 BLU LTX FREE SYN POLYISOPRENE POLYMER

## (undated) DEVICE — MARKER REFLECTIVE REFLECTIVE TWST ON SPHERZ 5PK

## (undated) DEVICE — SOLUTION IV 250ML 0.9% SOD CHL PH 5 INJ USP VIAFLX PLAS

## (undated) DEVICE — AGENT HEMOSTATIC SURGIFLOW MATRIX KIT W/THROMBIN

## (undated) DEVICE — LIQUIBAND RAPID ADHESIVE 36/CS 0.8ML: Brand: MEDLINE

## (undated) DEVICE — SYRINGE MED 10ML TRNSLUC BRL PLUNG BLK MRK POLYPR CTRL

## (undated) DEVICE — SUTURE ETHLN SZ 3-0 L18IN NONABSORBABLE BLK PS-2 L19MM 3/8 1669H

## (undated) DEVICE — APPLICATOR MEDICATED 10.5 CC SOLUTION HI LT ORNG CHLORAPREP

## (undated) DEVICE — TOWEL,STOP FLAG GOLD N-W: Brand: MEDLINE

## (undated) DEVICE — SSC BONE WAX: Brand: SSC BONE WAX

## (undated) DEVICE — SYRINGE WITH HYPODERMIC SAFETY NEEDLE: Brand: MAGELLAN

## (undated) DEVICE — ENDOSCOPY KIT: Brand: MEDLINE INDUSTRIES, INC.

## (undated) DEVICE — SUTURE MCRYL + SZ 4-0 L27IN ABSRB UD L19MM PS-2 3/8 CIR MCP426H

## (undated) DEVICE — 1 ML INSULIN SYRINGE LUER-LOCK WITH TIP CAP: Brand: MONOJECT

## (undated) DEVICE — CRANI: Brand: MEDLINE INDUSTRIES, INC.